# Patient Record
Sex: MALE | Race: WHITE | NOT HISPANIC OR LATINO | Employment: OTHER | URBAN - NONMETROPOLITAN AREA
[De-identification: names, ages, dates, MRNs, and addresses within clinical notes are randomized per-mention and may not be internally consistent; named-entity substitution may affect disease eponyms.]

---

## 2017-02-11 ENCOUNTER — HOSPITAL ENCOUNTER (EMERGENCY)
Facility: HOSPITAL | Age: 42
Discharge: HOME | End: 2017-02-11
Admitting: EMERGENCY MEDICINE
Payer: COMMERCIAL

## 2017-02-11 ENCOUNTER — APPOINTMENT (EMERGENCY)
Dept: RADIOLOGY | Facility: HOSPITAL | Age: 42
End: 2017-02-11
Payer: COMMERCIAL

## 2017-02-11 ENCOUNTER — APPOINTMENT (EMERGENCY)
Dept: CT IMAGING | Facility: HOSPITAL | Age: 42
End: 2017-02-11
Payer: COMMERCIAL

## 2017-02-11 VITALS
HEIGHT: 68 IN | RESPIRATION RATE: 20 BRPM | HEART RATE: 78 BPM | SYSTOLIC BLOOD PRESSURE: 136 MMHG | BODY MASS INDEX: 43.2 KG/M2 | OXYGEN SATURATION: 96 % | WEIGHT: 285.06 LBS | DIASTOLIC BLOOD PRESSURE: 74 MMHG | TEMPERATURE: 97.7 F

## 2017-02-11 DIAGNOSIS — J18.9 PNEUMONIA: Primary | ICD-10-CM

## 2017-02-11 LAB
ALBUMIN SERPL BCP-MCNC: 3.1 G/DL (ref 3.5–5)
ALP SERPL-CCNC: 47 U/L (ref 46–116)
ALT SERPL W P-5'-P-CCNC: 46 U/L (ref 12–78)
ANION GAP SERPL CALCULATED.3IONS-SCNC: 10 MMOL/L (ref 4–13)
APTT PPP: 33 SECONDS (ref 24–36)
AST SERPL W P-5'-P-CCNC: 53 U/L (ref 5–45)
BASOPHILS # BLD AUTO: 0.01 THOUSANDS/ΜL (ref 0–0.1)
BASOPHILS NFR BLD AUTO: 0 % (ref 0–1)
BILIRUB SERPL-MCNC: 0.6 MG/DL (ref 0.2–1)
BUN SERPL-MCNC: 13 MG/DL (ref 5–25)
CALCIUM SERPL-MCNC: 8.6 MG/DL (ref 8.3–10.1)
CHLORIDE SERPL-SCNC: 98 MMOL/L (ref 100–108)
CO2 SERPL-SCNC: 28 MMOL/L (ref 21–32)
CREAT SERPL-MCNC: 0.87 MG/DL (ref 0.6–1.3)
EOSINOPHIL # BLD AUTO: 0.1 THOUSAND/ΜL (ref 0–0.61)
EOSINOPHIL NFR BLD AUTO: 2 % (ref 0–6)
ERYTHROCYTE [DISTWIDTH] IN BLOOD BY AUTOMATED COUNT: 14 % (ref 11.6–15.1)
GFR SERPL CREATININE-BSD FRML MDRD: >60 ML/MIN/1.73SQ M
GLUCOSE SERPL-MCNC: 186 MG/DL (ref 65–140)
HCT VFR BLD AUTO: 42.6 % (ref 36.5–49.3)
HGB BLD-MCNC: 14.4 G/DL (ref 12–17)
INR PPP: 1.19 (ref 0.86–1.16)
LACTATE SERPL-SCNC: 1.4 MMOL/L (ref 0.5–2)
LIPASE SERPL-CCNC: 88 U/L (ref 73–393)
LYMPHOCYTES # BLD AUTO: 1.96 THOUSANDS/ΜL (ref 0.6–4.47)
LYMPHOCYTES NFR BLD AUTO: 34 % (ref 14–44)
MCH RBC QN AUTO: 28.6 PG (ref 26.8–34.3)
MCHC RBC AUTO-ENTMCNC: 33.8 G/DL (ref 31.4–37.4)
MCV RBC AUTO: 85 FL (ref 82–98)
MONOCYTES # BLD AUTO: 0.66 THOUSAND/ΜL (ref 0.17–1.22)
MONOCYTES NFR BLD AUTO: 11 % (ref 4–12)
NEUTROPHILS # BLD AUTO: 3.11 THOUSANDS/ΜL (ref 1.85–7.62)
NEUTS SEG NFR BLD AUTO: 53 % (ref 43–75)
NT-PROBNP SERPL-MCNC: 21 PG/ML
PLATELET # BLD AUTO: 180 THOUSANDS/UL (ref 149–390)
PMV BLD AUTO: 9.3 FL (ref 8.9–12.7)
POTASSIUM SERPL-SCNC: 4.4 MMOL/L (ref 3.5–5.3)
PROT SERPL-MCNC: 7.6 G/DL (ref 6.4–8.2)
PROTHROMBIN TIME: 14.7 SECONDS (ref 12–14.3)
RBC # BLD AUTO: 5.04 MILLION/UL (ref 3.88–5.62)
SODIUM SERPL-SCNC: 136 MMOL/L (ref 136–145)
SPECIMEN SOURCE: NORMAL
TROPONIN I BLD-MCNC: 0 NG/ML (ref 0–0.08)
WBC # BLD AUTO: 5.84 THOUSAND/UL (ref 4.31–10.16)

## 2017-02-11 PROCEDURE — 84484 ASSAY OF TROPONIN QUANT: CPT

## 2017-02-11 PROCEDURE — 85610 PROTHROMBIN TIME: CPT | Performed by: PHYSICIAN ASSISTANT

## 2017-02-11 PROCEDURE — 87798 DETECT AGENT NOS DNA AMP: CPT | Performed by: PHYSICIAN ASSISTANT

## 2017-02-11 PROCEDURE — 83605 ASSAY OF LACTIC ACID: CPT | Performed by: PHYSICIAN ASSISTANT

## 2017-02-11 PROCEDURE — 36415 COLL VENOUS BLD VENIPUNCTURE: CPT | Performed by: PHYSICIAN ASSISTANT

## 2017-02-11 PROCEDURE — 96365 THER/PROPH/DIAG IV INF INIT: CPT

## 2017-02-11 PROCEDURE — 85025 COMPLETE CBC W/AUTO DIFF WBC: CPT | Performed by: PHYSICIAN ASSISTANT

## 2017-02-11 PROCEDURE — 83690 ASSAY OF LIPASE: CPT | Performed by: PHYSICIAN ASSISTANT

## 2017-02-11 PROCEDURE — 99284 EMERGENCY DEPT VISIT MOD MDM: CPT

## 2017-02-11 PROCEDURE — 93005 ELECTROCARDIOGRAM TRACING: CPT | Performed by: PHYSICIAN ASSISTANT

## 2017-02-11 PROCEDURE — 96361 HYDRATE IV INFUSION ADD-ON: CPT

## 2017-02-11 PROCEDURE — 71275 CT ANGIOGRAPHY CHEST: CPT

## 2017-02-11 PROCEDURE — 85730 THROMBOPLASTIN TIME PARTIAL: CPT | Performed by: PHYSICIAN ASSISTANT

## 2017-02-11 PROCEDURE — 80053 COMPREHEN METABOLIC PANEL: CPT | Performed by: PHYSICIAN ASSISTANT

## 2017-02-11 PROCEDURE — 83880 ASSAY OF NATRIURETIC PEPTIDE: CPT | Performed by: PHYSICIAN ASSISTANT

## 2017-02-11 PROCEDURE — 71020 HB CHEST X-RAY 2VW FRONTAL&LATL: CPT

## 2017-02-11 RX ORDER — AZITHROMYCIN 250 MG/1
250 TABLET, FILM COATED ORAL DAILY
Qty: 6 TABLET | Refills: 0 | Status: SHIPPED | OUTPATIENT
Start: 2017-02-11 | End: 2017-02-16

## 2017-02-11 RX ORDER — CEFTRIAXONE SODIUM 1 G/50ML
1000 INJECTION, SOLUTION INTRAVENOUS ONCE
Status: COMPLETED | OUTPATIENT
Start: 2017-02-11 | End: 2017-02-11

## 2017-02-11 RX ADMIN — SODIUM CHLORIDE 1000 ML: 0.9 INJECTION, SOLUTION INTRAVENOUS at 12:15

## 2017-02-11 RX ADMIN — IOHEXOL 85 ML: 350 INJECTION, SOLUTION INTRAVENOUS at 13:39

## 2017-02-11 RX ADMIN — CEFTRIAXONE 1000 MG: 1 INJECTION, SOLUTION INTRAVENOUS at 14:33

## 2017-02-12 LAB
FLUAV AG SPEC QL: NORMAL
FLUBV AG SPEC QL: NORMAL
RSV B RNA SPEC QL NAA+PROBE: NORMAL

## 2017-02-13 LAB
ATRIAL RATE: 78 BPM
P AXIS: 27 DEGREES
PR INTERVAL: 116 MS
QRS AXIS: 16 DEGREES
QRSD INTERVAL: 80 MS
QT INTERVAL: 350 MS
QTC INTERVAL: 399 MS
T WAVE AXIS: 21 DEGREES
VENTRICULAR RATE: 78 BPM

## 2017-03-30 ENCOUNTER — TRANSCRIBE ORDERS (OUTPATIENT)
Dept: ADMINISTRATIVE | Facility: HOSPITAL | Age: 42
End: 2017-03-30

## 2017-03-30 ENCOUNTER — APPOINTMENT (OUTPATIENT)
Dept: LAB | Facility: HOSPITAL | Age: 42
End: 2017-03-30
Payer: COMMERCIAL

## 2017-03-30 DIAGNOSIS — Z11.3 ENCOUNTER FOR SCREENING FOR INFECTIONS WITH PREDOMINANTLY SEXUAL MODE OF TRANSMISSION: ICD-10-CM

## 2017-03-30 PROCEDURE — 87389 HIV-1 AG W/HIV-1&-2 AB AG IA: CPT

## 2017-03-30 PROCEDURE — 36415 COLL VENOUS BLD VENIPUNCTURE: CPT

## 2017-03-30 PROCEDURE — 87491 CHLMYD TRACH DNA AMP PROBE: CPT

## 2017-03-30 PROCEDURE — 86592 SYPHILIS TEST NON-TREP QUAL: CPT

## 2017-03-30 PROCEDURE — 80074 ACUTE HEPATITIS PANEL: CPT

## 2017-03-30 PROCEDURE — 87591 N.GONORRHOEAE DNA AMP PROB: CPT

## 2017-03-31 ENCOUNTER — GENERIC CONVERSION - ENCOUNTER (OUTPATIENT)
Dept: OTHER | Facility: OTHER | Age: 42
End: 2017-03-31

## 2017-03-31 LAB
CHLAMYDIA DNA CVX QL NAA+PROBE: NORMAL
HAV IGM SER QL: NORMAL
HBV CORE IGM SER QL: NORMAL
HBV SURFACE AG SER QL: NORMAL
HCV AB SER QL: NORMAL
HIV 1+2 AB+HIV1 P24 AG SERPL QL IA: NORMAL
N GONORRHOEA DNA GENITAL QL NAA+PROBE: NORMAL
RPR SER QL: NORMAL

## 2017-04-01 ENCOUNTER — GENERIC CONVERSION - ENCOUNTER (OUTPATIENT)
Dept: OTHER | Facility: OTHER | Age: 42
End: 2017-04-01

## 2017-04-20 ENCOUNTER — APPOINTMENT (EMERGENCY)
Dept: RADIOLOGY | Facility: HOSPITAL | Age: 42
End: 2017-04-20
Payer: COMMERCIAL

## 2017-04-20 ENCOUNTER — HOSPITAL ENCOUNTER (EMERGENCY)
Facility: HOSPITAL | Age: 42
Discharge: HOME | End: 2017-04-20
Attending: EMERGENCY MEDICINE
Payer: COMMERCIAL

## 2017-04-20 VITALS
DIASTOLIC BLOOD PRESSURE: 72 MMHG | BODY MASS INDEX: 41.68 KG/M2 | HEIGHT: 68 IN | HEART RATE: 88 BPM | OXYGEN SATURATION: 97 % | TEMPERATURE: 97.8 F | SYSTOLIC BLOOD PRESSURE: 161 MMHG | WEIGHT: 275 LBS | RESPIRATION RATE: 18 BRPM

## 2017-04-20 DIAGNOSIS — L03.115 CELLULITIS OF RIGHT FOOT: Primary | ICD-10-CM

## 2017-04-20 PROCEDURE — 90715 TDAP VACCINE 7 YRS/> IM: CPT | Performed by: EMERGENCY MEDICINE

## 2017-04-20 PROCEDURE — 73630 X-RAY EXAM OF FOOT: CPT

## 2017-04-20 PROCEDURE — 90471 IMMUNIZATION ADMIN: CPT

## 2017-04-20 PROCEDURE — 87205 SMEAR GRAM STAIN: CPT | Performed by: EMERGENCY MEDICINE

## 2017-04-20 PROCEDURE — 99283 EMERGENCY DEPT VISIT LOW MDM: CPT

## 2017-04-20 PROCEDURE — 87070 CULTURE OTHR SPECIMN AEROBIC: CPT | Performed by: EMERGENCY MEDICINE

## 2017-04-20 RX ORDER — NAPROXEN 500 MG/1
500 TABLET ORAL 2 TIMES DAILY WITH MEALS
Qty: 14 TABLET | Refills: 0 | Status: SHIPPED | OUTPATIENT
Start: 2017-04-20 | End: 2017-10-12 | Stop reason: ALTCHOICE

## 2017-04-20 RX ORDER — LEVOFLOXACIN 750 MG/1
750 TABLET ORAL DAILY
Qty: 7 TABLET | Refills: 0 | Status: SHIPPED | OUTPATIENT
Start: 2017-04-20 | End: 2017-04-27

## 2017-04-20 RX ORDER — CEPHALEXIN 250 MG/1
500 CAPSULE ORAL ONCE
Status: COMPLETED | OUTPATIENT
Start: 2017-04-20 | End: 2017-04-20

## 2017-04-20 RX ORDER — CEPHALEXIN 500 MG/1
500 CAPSULE ORAL 2 TIMES DAILY
Qty: 14 CAPSULE | Refills: 0 | Status: SHIPPED | OUTPATIENT
Start: 2017-04-20 | End: 2017-04-27

## 2017-04-20 RX ORDER — NAPROXEN 250 MG/1
500 TABLET ORAL ONCE
Status: COMPLETED | OUTPATIENT
Start: 2017-04-20 | End: 2017-04-20

## 2017-04-20 RX ADMIN — NAPROXEN 500 MG: 250 TABLET ORAL at 17:30

## 2017-04-20 RX ADMIN — CEPHALEXIN 500 MG: 250 CAPSULE ORAL at 17:30

## 2017-04-20 RX ADMIN — TETANUS TOXOID, REDUCED DIPHTHERIA TOXOID AND ACELLULAR PERTUSSIS VACCINE, ADSORBED 0.5 ML: 5; 2.5; 8; 8; 2.5 SUSPENSION INTRAMUSCULAR at 17:30

## 2017-04-20 RX ADMIN — LEVOFLOXACIN 750 MG: 500 TABLET, FILM COATED ORAL at 17:30

## 2017-04-22 LAB
BACTERIA WND AEROBE CULT: NORMAL
GRAM STN SPEC: NORMAL
GRAM STN SPEC: NORMAL

## 2017-05-21 ENCOUNTER — APPOINTMENT (EMERGENCY)
Dept: RADIOLOGY | Facility: HOSPITAL | Age: 42
End: 2017-05-21
Payer: COMMERCIAL

## 2017-05-21 ENCOUNTER — HOSPITAL ENCOUNTER (EMERGENCY)
Facility: HOSPITAL | Age: 42
Discharge: HOME | End: 2017-05-21
Payer: COMMERCIAL

## 2017-05-21 VITALS
DIASTOLIC BLOOD PRESSURE: 66 MMHG | HEIGHT: 68 IN | HEART RATE: 91 BPM | BODY MASS INDEX: 44.89 KG/M2 | RESPIRATION RATE: 18 BRPM | WEIGHT: 296.19 LBS | SYSTOLIC BLOOD PRESSURE: 115 MMHG | TEMPERATURE: 98.6 F | OXYGEN SATURATION: 95 %

## 2017-05-21 DIAGNOSIS — S50.00XA: Primary | ICD-10-CM

## 2017-05-21 PROCEDURE — 73080 X-RAY EXAM OF ELBOW: CPT

## 2017-05-21 PROCEDURE — 99283 EMERGENCY DEPT VISIT LOW MDM: CPT

## 2017-05-21 PROCEDURE — 73110 X-RAY EXAM OF WRIST: CPT

## 2017-10-12 ENCOUNTER — APPOINTMENT (EMERGENCY)
Dept: CT IMAGING | Facility: HOSPITAL | Age: 42
End: 2017-10-12
Payer: COMMERCIAL

## 2017-10-12 ENCOUNTER — HOSPITAL ENCOUNTER (EMERGENCY)
Facility: HOSPITAL | Age: 42
Discharge: HOME | End: 2017-10-13
Attending: EMERGENCY MEDICINE | Admitting: EMERGENCY MEDICINE
Payer: COMMERCIAL

## 2017-10-12 DIAGNOSIS — R73.9 HYPERGLYCEMIA: ICD-10-CM

## 2017-10-12 DIAGNOSIS — R42 DIZZINESS: Primary | ICD-10-CM

## 2017-10-12 LAB
ALBUMIN SERPL BCP-MCNC: 3.6 G/DL (ref 3.5–5)
ALP SERPL-CCNC: 57 U/L (ref 46–116)
ALT SERPL W P-5'-P-CCNC: 64 U/L (ref 12–78)
ANION GAP SERPL CALCULATED.3IONS-SCNC: 9 MMOL/L (ref 4–13)
APTT PPP: 35 SECONDS (ref 23–35)
AST SERPL W P-5'-P-CCNC: 50 U/L (ref 5–45)
BACTERIA UR QL AUTO: NORMAL /HPF
BASOPHILS # BLD AUTO: 0.03 THOUSANDS/ΜL (ref 0–0.1)
BASOPHILS NFR BLD AUTO: 0 % (ref 0–1)
BILIRUB SERPL-MCNC: 0.4 MG/DL (ref 0.2–1)
BILIRUB UR QL STRIP: NEGATIVE
BUN SERPL-MCNC: 17 MG/DL (ref 5–25)
CALCIUM SERPL-MCNC: 10 MG/DL (ref 8.3–10.1)
CHLORIDE SERPL-SCNC: 95 MMOL/L (ref 100–108)
CLARITY UR: CLEAR
CO2 SERPL-SCNC: 29 MMOL/L (ref 21–32)
COLOR UR: YELLOW
CREAT SERPL-MCNC: 0.96 MG/DL (ref 0.6–1.3)
EOSINOPHIL # BLD AUTO: 0.23 THOUSAND/ΜL (ref 0–0.61)
EOSINOPHIL NFR BLD AUTO: 2 % (ref 0–6)
ERYTHROCYTE [DISTWIDTH] IN BLOOD BY AUTOMATED COUNT: 13.8 % (ref 11.6–15.1)
GFR SERPL CREATININE-BSD FRML MDRD: 97 ML/MIN/1.73SQ M
GLUCOSE SERPL-MCNC: 337 MG/DL (ref 65–140)
GLUCOSE UR STRIP-MCNC: ABNORMAL MG/DL
HCT VFR BLD AUTO: 43.2 % (ref 36.5–49.3)
HGB BLD-MCNC: 14.9 G/DL (ref 12–17)
HGB UR QL STRIP.AUTO: NEGATIVE
INR PPP: 1.26 (ref 0.86–1.16)
KETONES UR STRIP-MCNC: NEGATIVE MG/DL
LEUKOCYTE ESTERASE UR QL STRIP: ABNORMAL
LYMPHOCYTES # BLD AUTO: 2.73 THOUSANDS/ΜL (ref 0.6–4.47)
LYMPHOCYTES NFR BLD AUTO: 25 % (ref 14–44)
MCH RBC QN AUTO: 28.9 PG (ref 26.8–34.3)
MCHC RBC AUTO-ENTMCNC: 34.5 G/DL (ref 31.4–37.4)
MCV RBC AUTO: 84 FL (ref 82–98)
MONOCYTES # BLD AUTO: 0.43 THOUSAND/ΜL (ref 0.17–1.22)
MONOCYTES NFR BLD AUTO: 4 % (ref 4–12)
NEUTROPHILS # BLD AUTO: 7.52 THOUSANDS/ΜL (ref 1.85–7.62)
NEUTS SEG NFR BLD AUTO: 69 % (ref 43–75)
NITRITE UR QL STRIP: NEGATIVE
NON-SQ EPI CELLS URNS QL MICRO: NORMAL /HPF
PH UR STRIP.AUTO: 5.5 [PH] (ref 4.5–8)
PLATELET # BLD AUTO: 266 THOUSANDS/UL (ref 149–390)
PMV BLD AUTO: 9.5 FL (ref 8.9–12.7)
POTASSIUM SERPL-SCNC: 4 MMOL/L (ref 3.5–5.3)
PROT SERPL-MCNC: 8.2 G/DL (ref 6.4–8.2)
PROT UR STRIP-MCNC: NEGATIVE MG/DL
PROTHROMBIN TIME: 15.7 SECONDS (ref 12.1–14.4)
RBC # BLD AUTO: 5.15 MILLION/UL (ref 3.88–5.62)
RBC #/AREA URNS AUTO: NORMAL /HPF
SODIUM SERPL-SCNC: 133 MMOL/L (ref 136–145)
SP GR UR STRIP.AUTO: 1.02 (ref 1–1.03)
TROPONIN I SERPL-MCNC: <0.02 NG/ML
UROBILINOGEN UR QL STRIP.AUTO: 0.2 E.U./DL
WBC # BLD AUTO: 10.94 THOUSAND/UL (ref 4.31–10.16)
WBC #/AREA URNS AUTO: NORMAL /HPF

## 2017-10-12 PROCEDURE — 84484 ASSAY OF TROPONIN QUANT: CPT | Performed by: EMERGENCY MEDICINE

## 2017-10-12 PROCEDURE — 81001 URINALYSIS AUTO W/SCOPE: CPT | Performed by: EMERGENCY MEDICINE

## 2017-10-12 PROCEDURE — 93005 ELECTROCARDIOGRAM TRACING: CPT | Performed by: EMERGENCY MEDICINE

## 2017-10-12 PROCEDURE — 85730 THROMBOPLASTIN TIME PARTIAL: CPT | Performed by: EMERGENCY MEDICINE

## 2017-10-12 PROCEDURE — 74177 CT ABD & PELVIS W/CONTRAST: CPT

## 2017-10-12 PROCEDURE — 85025 COMPLETE CBC W/AUTO DIFF WBC: CPT | Performed by: EMERGENCY MEDICINE

## 2017-10-12 PROCEDURE — 71275 CT ANGIOGRAPHY CHEST: CPT

## 2017-10-12 PROCEDURE — 70450 CT HEAD/BRAIN W/O DYE: CPT

## 2017-10-12 PROCEDURE — 85610 PROTHROMBIN TIME: CPT | Performed by: EMERGENCY MEDICINE

## 2017-10-12 PROCEDURE — 80053 COMPREHEN METABOLIC PANEL: CPT | Performed by: EMERGENCY MEDICINE

## 2017-10-12 PROCEDURE — 96361 HYDRATE IV INFUSION ADD-ON: CPT

## 2017-10-12 PROCEDURE — 96374 THER/PROPH/DIAG INJ IV PUSH: CPT

## 2017-10-12 RX ORDER — ONDANSETRON 2 MG/ML
4 INJECTION INTRAMUSCULAR; INTRAVENOUS ONCE
Status: COMPLETED | OUTPATIENT
Start: 2017-10-12 | End: 2017-10-12

## 2017-10-12 RX ORDER — TRAZODONE HYDROCHLORIDE 50 MG/1
50 TABLET ORAL
COMMUNITY
End: 2018-08-13

## 2017-10-12 RX ORDER — MECLIZINE HYDROCHLORIDE 25 MG/1
25 TABLET ORAL 3 TIMES DAILY PRN
Qty: 30 TABLET | Refills: 0 | Status: SHIPPED | OUTPATIENT
Start: 2017-10-12 | End: 2017-11-18 | Stop reason: ALTCHOICE

## 2017-10-12 RX ORDER — MECLIZINE HCL 12.5 MG/1
25 TABLET ORAL ONCE
Status: COMPLETED | OUTPATIENT
Start: 2017-10-12 | End: 2017-10-12

## 2017-10-12 RX ORDER — ONDANSETRON 4 MG/1
4 TABLET, ORALLY DISINTEGRATING ORAL EVERY 6 HOURS PRN
Qty: 20 TABLET | Refills: 0 | Status: SHIPPED | OUTPATIENT
Start: 2017-10-12 | End: 2017-11-18 | Stop reason: ALTCHOICE

## 2017-10-12 RX ADMIN — ONDANSETRON 4 MG: 2 INJECTION INTRAMUSCULAR; INTRAVENOUS at 20:56

## 2017-10-12 RX ADMIN — SODIUM CHLORIDE 1000 ML: 0.9 INJECTION, SOLUTION INTRAVENOUS at 20:53

## 2017-10-12 RX ADMIN — MECLIZINE 25 MG: 12.5 TABLET ORAL at 23:38

## 2017-10-12 RX ADMIN — IOHEXOL 100 ML: 350 INJECTION, SOLUTION INTRAVENOUS at 21:56

## 2017-10-13 VITALS
RESPIRATION RATE: 18 BRPM | BODY MASS INDEX: 44.26 KG/M2 | TEMPERATURE: 98.1 F | SYSTOLIC BLOOD PRESSURE: 110 MMHG | DIASTOLIC BLOOD PRESSURE: 64 MMHG | HEART RATE: 71 BPM | WEIGHT: 291.1 LBS | OXYGEN SATURATION: 99 %

## 2017-10-13 PROCEDURE — 99284 EMERGENCY DEPT VISIT MOD MDM: CPT

## 2017-10-13 NOTE — ED PROVIDER NOTES
Pt Name: Thania Pickard  MRN: [id number]  [place] 1975  Age/Sex: 43 y o  male  Date of evaluation: 10/12/2017  PCP: James Hidalgo, [address] [place] [address]    Chief Complaint   Patient presents with    Dizziness     Patient stated he started to not feel good yesterday  He has  nausea, vomiting, dizziness and blurred vision  Patient also stated about an hour prior to feeling this way he burned his right arm on the oven door  Patient stated some sob, pt has a hx pf PEs in left lung and is takign Xarelto  HPI    Emily Jackman presents to the Emergency Department complaining of dizziness  He is concerned because he feels similar to when he had his previous PE and was diagnosed with a clotting disorder  He is on anticoagulation and has been taking it regularly  HPI      Past Medical and Surgical History    Past Medical History:   Diagnosis Date    B[facility] ([facility] )     Chronic kidney disease     [facility]     Hyperlipidemia     Hypertension     Diana-Hussein tear     Pulmonary emboli (HCC)     Sleep apnea        Past Surgical History:   Procedure Laterality Date    NASAL SEPTUM SURGERY         History reviewed  No pertinent family history  Social History   Substance Use Topics    Smoking status: Never Smoker    Smokeless tobacco: Current User     Types: Chew    Alcohol use No              Allergies    Allergies   Allergen Reactions    Epinephrine Other (See Comments)     "pass out"    Other Other (See Comments)     NAUSEA VOMITING TO SHELLFISH    Shellfish-Derived Products GI Intolerance       Home Medications    Prior to Admission medications    Medication Sig Start Date End Date Taking? Authorizing Provider   atorvastatin (LIPITOR) 10 mg tablet Take 10 mg by mouth daily  Yes Historical Provider, MD   divalproex sodium (DEPAKOTE) 500 mg EC tablet Take 500 mg by mouth 2 (two) times a day     Yes Historical Provider, MD   lisinopril (ZESTRIL) 10 mg tablet Take 10 mg by mouth daily  Yes Historical Provider, MD   metFORMIN (GLUCOPHAGE) 1000 MG tablet Take 1,000 mg by mouth daily with breakfast   Yes Historical Provider, MD   rivaroxaban (XARELTO) tablet Take 20 mg by mouth daily  Yes Historical Provider, MD   sitaGLIPtin (JANUVIA) 100 mg tablet Take 100 mg by mouth daily   Yes Historical Provider, MD   traZODone (DESYREL) 50 mg tablet Take 50 mg by mouth daily at bedtime   Yes Historical Provider, MD   naproxen (NAPROSYN) 500 mg tablet Take 1 tablet by mouth 2 (two) times a day with meals for 7 days 4/20/17 10/12/17  Case RAAD Carl DO   ondansetron (ZOFRAN) 4 mg tablet Take 1 tablet (4 mg total) by mouth every 6 (six) hours  7/28/16 10/12/17  Case RAAD Carl DO           Review of Systems    Review of Systems   Constitutional: Negative for activity change, appetite change, chills, fatigue and fever  HENT: Negative for congestion, rhinorrhea, sinus pressure, sneezing, sore throat and trouble swallowing  Eyes: Negative for photophobia and visual disturbance  Respiratory: Negative for chest tightness, shortness of breath and wheezing  Cardiovascular: Negative for chest pain and leg swelling  Gastrointestinal: Negative for abdominal distention, abdominal pain, constipation, diarrhea, nausea and vomiting  Endocrine: Negative for polydipsia, polyphagia and polyuria  Genitourinary: Negative for decreased urine volume, difficulty urinating, dysuria, flank pain, frequency and urgency  Musculoskeletal: Negative for back pain, gait problem, joint swelling and neck pain  Skin: Negative for color change, pallor and rash  Allergic/Immunologic: Negative for immunocompromised state  Neurological: Negative for seizures, syncope, speech difficulty, weakness, light-headedness and headaches  Psychiatric/Behavioral: Negative for confusion  All other systems reviewed and are negative        Physical Exam      ED Triage Vitals [10/12/17 2003]   Temperature Pulse Respirations Blood Pressure SpO2   97 8 °F (36 6 °C) 89 20 107/62 97 %      Temp Source Heart Rate Source Patient Position - Orthostatic VS BP Location FiO2 (%)   Temporal Monitor Sitting Right arm --      Pain Score       8               Physical Exam   Constitutional: He is oriented to person, place, and time  He appears well-developed and well-nourished  No distress  HENT:   Head: Normocephalic and atraumatic  Nose: Nose normal    Mouth/Throat: Oropharynx is clear and moist    Eyes: Conjunctivae and EOM are normal  Pupils are equal, round, and reactive to light  Neck: Normal range of motion  Neck supple  Cardiovascular: Normal rate, regular rhythm and normal heart sounds  Exam reveals no gallop and no friction rub  No murmur heard  Pulmonary/Chest: Effort normal and breath sounds normal  No respiratory distress  He has no wheezes  He has no rales  Abdominal: Soft  Bowel sounds are normal  There is no tenderness  There is no rebound and no guarding  Musculoskeletal: Normal range of motion  Neurological: He is alert and oriented to person, place, and time  Skin: Skin is warm and dry  He is not diaphoretic  Psychiatric: He has a normal mood and affect  His behavior is normal    Nursing note and vitals reviewed  Assessment and Plan    Wade Brown is a 43 y o  male who presents with dizziness  Physical examination unremarkable  Plan will be to perform diagnostic testing and treat symptomatically  MDM    Diagnostic Results        EKG INTERPRETATION  RHYTHM:NSR @ 80  AXIS: normal  INTERVALS: normal  QRS COMPLEX: normal  ST SEGMENT: normal  QT INTERVAL: normal  COMPARED WITH PRIOR none available  Interpretation by Iftikhar Gan DO  EKG reviewed and interpreted independently      Labs:    Results for orders placed or performed during the hospital encounter of 10/12/17   CBC and differential   Result Value Ref Range    WBC 10 94 (H) 4 31 - 10 16 Thousand/uL    RBC 5 15 3 88 - 5 62 Million/uL    Hemoglobin 14 9 12 0 - 17 0 g/dL    Hematocrit 43 2 36 5 - 49 3 %    MCV 84 82 - 98 fL    MCH 28 9 26 8 - 34 3 pg    MCHC 34 5 31 4 - 37 4 g/dL    RDW 13 8 11 6 - 15 1 %    MPV 9 5 8 9 - 12 7 fL    Platelets 945 903 - 776 Thousands/uL    Neutrophils Relative 69 43 - 75 %    Lymphocytes Relative 25 14 - 44 %    Monocytes Relative 4 4 - 12 %    Eosinophils Relative 2 0 - 6 %    Basophils Relative 0 0 - 1 %    Neutrophils Absolute 7 52 1 85 - 7 62 Thousands/µL    Lymphocytes Absolute 2 73 0 60 - 4 47 Thousands/µL    Monocytes Absolute 0 43 0 17 - 1 22 Thousand/µL    Eosinophils Absolute 0 23 0 00 - 0 61 Thousand/µL    Basophils Absolute 0 03 0 00 - 0 10 Thousands/µL   Comprehensive metabolic panel   Result Value Ref Range    Sodium 133 (L) 136 - 145 mmol/L    Potassium 4 0 3 5 - 5 3 mmol/L    Chloride 95 (L) 100 - 108 mmol/L    CO2 29 21 - 32 mmol/L    Anion Gap 9 4 - 13 mmol/L    BUN 17 5 - 25 mg/dL    Creatinine 0 96 0 60 - 1 30 mg/dL    Glucose 337 (H) 65 - 140 mg/dL    Calcium 10 0 8 3 - 10 1 mg/dL    AST 50 (H) 5 - 45 U/L    ALT 64 12 - 78 U/L    Alkaline Phosphatase 57 46 - 116 U/L    Total Protein 8 2 6 4 - 8 2 g/dL    Albumin 3 6 3 5 - 5 0 g/dL    Total Bilirubin 0 40 0 20 - 1 00 mg/dL    eGFR 97 ml/min/1 73sq m   Protime-INR   Result Value Ref Range    Protime 15 7 (H) 12 1 - 14 4 seconds    INR 1 26 (H) 0 86 - 1 16   APTT   Result Value Ref Range    PTT 35 23 - 35 seconds   Troponin I   Result Value Ref Range    Troponin I <0 02 <=0 04 ng/mL   UA w Reflex to Microscopic w Reflex to Culture   Result Value Ref Range    Color, UA Yellow     Clarity, UA Clear     Specific Gravity, UA 1 020 1 003 - 1 030    pH, UA 5 5 4 5 - 8 0    Leukocytes, UA (A) Negative     Elevated glucose may cause decreased leukocyte values   See urine microscopic for [facility] result/    Nitrite, UA Negative Negative    Protein, UA Negative Negative mg/dl    Glucose, UA >=1000 (1%) (A) Negative mg/dl    Ketones, UA Negative Negative mg/dl    Urobilinogen, UA 0 2 0 2, 1 0 E U /dl E U /dl    Bilirubin, UA Negative Negative    Blood, UA Negative Negative, Trace-Intact   Urine Microscopic   Result Value Ref Range    RBC, UA None Seen None Seen, 0-5 /hpf    WBC, UA None Seen None Seen, 0-5, 5-55, 5-65 /hpf    Epithelial Cells Occasional None Seen, Occasional /hpf    Bacteria, UA Occasional None Seen, Occasional /hpf       All labs reviewed and utilized in the medical decision making process    Radiology:    PE Study with CT Abdomen and Pelvis with contrast   Final Result      No pulmonary embolism  No acute inflammatory process in the abdomen or pelvis  Hepatomegaly with steatosis  Splenomegaly  Workstation performed: [id number]         CT head without contrast   Final Result      No acute intracranial abnormality  Workstation performed: [id number]             All radiology studies independently viewed by me and interpreted by the radiologist     Procedure    Procedures    CritCare Time      ED Course of Care and Re-Assessments    Patient was feeling better after meds  He was able to ambulate without a problem  Medications   sodium chloride 0 9 % bolus 1,000 mL (0 mL Intravenous Stopped 10/12/17 2153)   ondansetron (ZOFRAN) injection 4 mg (4 mg Intravenous Given 10/12/17 2056)   iohexol (OMNIPAQUE) 350 MG/ML injection (SINGLE-DOSE) 100 mL (100 mL Intravenous Given 10/12/17 2156)   meclizine (ANTIVERT) tablet 25 mg (25 mg Oral Given 10/12/17 2338)           FINAL IMPRESSION    Final diagnoses:   Dizziness   Hyperglycemia         DISPOSITION/PLAN    ED Disposition     ED Disposition Condition Comment    Discharge  Shelbi Chacon discharge to home/self care      Condition at discharge: Good        Follow-up Information     Follow up With Specialties Details Why Contact Info Additional [address] [place],  Family Medicine Schedule an appointment as soon as possible for a visit  [address] [facility] [address]  [address]  [facility] Steve Sexton [address] [facility] Emergency Department Emergency Medicine Go to As needed, If symptoms worsen [facility] ED, [place] [address], [place], [place], [address]            PATIENT REFERRED TO:    Jessica Owens DO  [address]  [place] [address] [facility]    Schedule an appointment as soon as possible for a visit      [facility]  [place] [id number]  [phone]  Go to  As needed, If symptoms worsen      DISCHARGE MEDICATIONS:    Discharge Medication List as of 10/12/2017 11:59 PM      START taking these medications    Details   meclizine (ANTIVERT) 25 mg tablet Take 1 tablet by mouth 3 (three) times a day as needed for dizziness, Starting Thu 10/12/2017, Print      ondansetron (ZOFRAN-ODT) 4 mg disintegrating tablet Take 1 tablet by mouth every 6 (six) hours as needed for nausea or vomiting, Starting u 10/12/2017, Print         CONTINUE these medications which have NOT CHANGED    Details   atorvastatin (LIPITOR) 10 mg tablet Take 10 mg by mouth daily  , Until Discontinued, Historical Med      divalproex sodium (DEPAKOTE) 500 mg EC tablet Take 500 mg by mouth 2 (two) times a day , Until Discontinued, Historical Med      lisinopril (ZESTRIL) 10 mg tablet Take 10 mg by mouth daily  , Until Discontinued, Historical Med      metFORMIN (GLUCOPHAGE) 1000 MG tablet Take 1,000 mg by mouth daily with breakfast, Until Discontinued, Historical Med      rivaroxaban (XARELTO) tablet Take 20 mg by mouth daily  , Until Discontinued, Historical Med      sitaGLIPtin (JANUVIA) 100 mg tablet Take 100 mg by mouth daily, Until Discontinued, Historical Med      traZODone (DESYREL) 50 mg tablet Take 50 mg by mouth daily at bedtime, Historical Med             No discharge procedures on file           DO Tim Ceja DO Venkata  10/13/17 7067

## 2017-10-13 NOTE — DISCHARGE INSTRUCTIONS
Dizziness, Ambulatory Care   GENERAL INFORMATION:   Dizziness  is a term used to describe a feeling of being off balance or unsteady  Common causes of dizziness are an inner ear fluid imbalance or a lack of oxygen in your blood  Your dizziness may be acute (lasts 3 days or less) or chronic (lasts longer than 3 days)  You may have dizzy spells that last from seconds to a few hours  Common symptoms related to dizziness include the following:   · A feeling that your surroundings are moving even though you are standing still    · Ringing in your ears or hearing loss     · Feeling faint or lightheaded     · Weakness or unsteadiness     · Double vision or eye movements you cannot control    · Nausea or vomiting     · Confusion  Seek immediate care for the following symptoms:   · You have a headache that does not go away with medicine  · You have shaking chills and a fever  · You vomit over and over with no relief  · Your vomit or bowel movements are red or black  · You have pain in your chest, back, or abdomen  · You have numbness, especially in your face, arms, or legs  · You have trouble moving your arms or legs  Treatment for dizziness  depends on the cause of your dizziness  You may need medicines to decrease your dizziness or nausea  You may need to be admitted to the hospital for treatment  Manage your symptoms:  Get up slowly from sitting or lying down  Do not drive or operate machinery when you are dizzy  Follow up with your healthcare provider as directed:  Write down your questions so you remember to ask them during your visits  CARE AGREEMENT:   You have the right to help plan your care  Learn about your health condition and how it may be treated  Discuss treatment options with your caregivers to decide what care you want to receive  You always have the right to refuse treatment  The above information is an  only   It is not intended as medical advice for individual conditions or treatments  Talk to your doctor, nurse or pharmacist before following any medical regimen to see if it is safe and effective for you  © 2014 [address] is for End User's use only and may not be sold, redistributed or otherwise used for commercial purposes  All illustrations and images included in CareNotes® are the copyrighted property of A D A M , Inc  or Miguel Santo

## 2017-10-15 LAB
ATRIAL RATE: 80 BPM
P AXIS: 28 DEGREES
PR INTERVAL: 126 MS
QRS AXIS: 3 DEGREES
QRSD INTERVAL: 82 MS
QT INTERVAL: 370 MS
QTC INTERVAL: 426 MS
T WAVE AXIS: 20 DEGREES
VENTRICULAR RATE: 80 BPM

## 2017-11-18 ENCOUNTER — HOSPITAL ENCOUNTER (EMERGENCY)
Facility: HOSPITAL | Age: 42
Discharge: HOME | End: 2017-11-18
Attending: EMERGENCY MEDICINE | Admitting: EMERGENCY MEDICINE
Payer: COMMERCIAL

## 2017-11-18 VITALS
TEMPERATURE: 96.7 F | BODY MASS INDEX: 42.57 KG/M2 | SYSTOLIC BLOOD PRESSURE: 135 MMHG | HEART RATE: 72 BPM | HEIGHT: 68 IN | RESPIRATION RATE: 17 BRPM | DIASTOLIC BLOOD PRESSURE: 69 MMHG | WEIGHT: 280.87 LBS | OXYGEN SATURATION: 99 %

## 2017-11-18 DIAGNOSIS — L03.90 CELLULITIS: Primary | ICD-10-CM

## 2017-11-18 PROCEDURE — 99283 EMERGENCY DEPT VISIT LOW MDM: CPT

## 2017-11-18 RX ORDER — CEPHALEXIN 500 MG/1
500 CAPSULE ORAL EVERY 6 HOURS SCHEDULED
Qty: 40 CAPSULE | Refills: 0 | Status: SHIPPED | OUTPATIENT
Start: 2017-11-18 | End: 2017-11-28

## 2017-11-18 RX ORDER — CEPHALEXIN 250 MG/1
500 CAPSULE ORAL ONCE
Status: COMPLETED | OUTPATIENT
Start: 2017-11-18 | End: 2017-11-18

## 2017-11-18 RX ADMIN — CEPHALEXIN 500 MG: 250 CAPSULE ORAL at 05:49

## 2017-11-18 NOTE — ED PROVIDER NOTES
History  Chief Complaint   Patient presents with    Foot Injury     Patient stubbed his right great toe 3 times in the past 3 days  Patient is a diabetic and wanted it checked out  49-year-old male patient presents emergency department for evaluation of right great toe pain  The patient does have an ingrown toenail  The patient is supposed to follow up with Podiatry but never did  The patient has some mild surrounding cellulitis of the nail with purulent discharge  The patient was started on Keflex, will be sent for follow-up to Podiatry  Patient has no systemic signs of infection  There is no lymphangitis  There is no expanding cellulitis  The infection has been present for over a week currently  History provided by:  Patient   used: No    Toe Pain   Location:  Right great toe  Severity:  Mild  Onset quality:  Gradual  Timing:  Constant  Progression:  Worsening  Chronicity:  Recurrent  Associated symptoms: no chest pain, no congestion, no ear pain and no fever        Prior to Admission Medications   Prescriptions Last Dose Informant Patient Reported? Taking?   atorvastatin (LIPITOR) 10 mg tablet   Yes Yes   Sig: Take 10 mg by mouth daily  divalproex sodium (DEPAKOTE) 500 mg EC tablet   Yes Yes   Sig: Take 500 mg by mouth 2 (two) times a day  lisinopril (ZESTRIL) 10 mg tablet   Yes Yes   Sig: Take 10 mg by mouth daily  metFORMIN (GLUCOPHAGE) 1000 MG tablet   Yes Yes   Sig: Take 1,000 mg by mouth daily with breakfast   rivaroxaban (XARELTO) tablet   Yes Yes   Sig: Take 20 mg by mouth daily       sitaGLIPtin (JANUVIA) 100 mg tablet   Yes Yes   Sig: Take 100 mg by mouth daily   traZODone (DESYREL) 50 mg tablet   Yes Yes   Sig: Take 50 mg by mouth daily at bedtime      Facility-Administered Medications: None       Past Medical History:   Diagnosis Date    [facility]     Chronic kidney disease     [facility] mellitus ([facility] )     Hyperlipidemia     Hypertension  Diana-Hussein tear     Pulmonary emboli (HCC)     Sleep apnea        Past Surgical History:   Procedure Laterality Date    NASAL SEPTUM SURGERY         History reviewed  No pertinent family history  I have reviewed and agree with the history as documented  Social History   Substance Use Topics    Smoking status: Never Smoker    Smokeless tobacco: Current User     Types: Chew    Alcohol use No        Review of Systems   Constitutional: Negative for fever  HENT: Negative for congestion and ear pain  Cardiovascular: Negative for chest pain  All other systems reviewed and are negative  Physical Exam  ED Triage Vitals [11/18/17 0530]   Temperature Pulse Respirations Blood Pressure SpO2   (!) 96 7 °F (35 9 °C) 72 17 135/69 99 %      Temp Source Heart Rate Source Patient Position - Orthostatic VS BP Location FiO2 (%)   Temporal Monitor Lying Left arm --      Pain Score       7           Orthostatic Vital Signs  Vitals:    11/18/17 0530   BP: 135/69   Pulse: 72   Patient Position - Orthostatic VS: Lying       Physical Exam   Constitutional: He is oriented to person, place, and time  He appears well-developed and well-nourished  No distress  HENT:   Head: Normocephalic and atraumatic  Right Ear: External ear normal    Left Ear: External ear normal    Eyes: Conjunctivae and EOM are normal  Right eye exhibits no discharge  Left eye exhibits no discharge  No scleral icterus  Neck: Normal range of motion  Neck supple  No JVD present  No tracheal deviation present  No thyromegaly present  Cardiovascular: Normal rate and regular rhythm  Pulmonary/Chest: Effort normal and breath sounds normal  No stridor  No respiratory distress  He has no wheezes  He has no rales  Abdominal: Soft  Bowel sounds are normal  He exhibits no distension  There is no tenderness  Musculoskeletal: Normal range of motion  He exhibits no edema, tenderness or deformity     Neurological: He is alert and oriented to person, place, and time  No cranial nerve deficit  Coordination normal    Skin: Skin is warm and dry  He is not diaphoretic  Psychiatric: He has a normal mood and affect  His behavior is normal    Nursing note and vitals reviewed  ED Medications  Medications   cephalexin (KEFLEX) capsule 500 mg (not administered)       Diagnostic Studies  Results Reviewed     None                 No orders to display              Procedures  Procedures       Phone Contacts  ED Phone Contact    ED Course  ED Course                                MDM  Number of Diagnoses or Management Options  Cellulitis: new and requires workup     Amount and/or Complexity of Data Reviewed  Decide to obtain previous medical records or to obtain history from someone other than the patient: yes  Review and summarize past medical records: yes    Patient Progress  Patient progress: stable    CritCare Time    Disposition  Final diagnoses:   Cellulitis     Time reflects when diagnosis was documented in both MDM as applicable and the Disposition within this note     Time User Action Codes Description Comment    11/18/2017  5:42 AM Kae Pizano Add [L03 90] Cellulitis       ED Disposition     ED Disposition Condition Comment    Discharge  Patricia Farris discharge to home/self care  Condition at discharge: Stable        Follow-up Information     Follow up With Specialties Details Why R Ismael Pino 73, DPM Podiatry   [facility] [address]  [address]  [phone]          Patient's Medications   Discharge Prescriptions    CEPHALEXIN (KEFLEX) 500 MG CAPSULE    Take 1 capsule by mouth every 6 (six) hours for 10 days       Start Date: 11/18/2017End Date: 11/28/2017       Order Dose: 500 mg       Quantity: 40 capsule    Refills: 0     No discharge procedures on file      ED Provider  Electronically Signed by           Nkechi Ambrose DO  11/18/17 4066

## 2017-11-18 NOTE — DISCHARGE INSTRUCTIONS
Cellulitis, Ambulatory Care   GENERAL INFORMATION:   Cellulitis  is a skin infection caused by bacteria  Common symptoms include the following:   · Fever    · A red, warm, swollen area on your skin    · Pain when the area is touched    · Bumps or blisters (abscess) that may drain pus    · Bumpy, raised skin that feels like an orange peel  Seek immediate care for the following symptoms:   · An increase in pain, redness, warmth, and size    · Red streaks coming from the infected area    · A thin, gray-brown discharge coming from your infected skin area    · A crackling under your skin when you touch it    · Purple dots or bumps on your skin, or bleeding under your skin    · New swelling and pain in your legs    · Sudden trouble breathing or chest pain  Treatment for cellulitis  may include medicines to treat the bacterial infection or decrease pain  The infection may need to be cleaned out  Damaged, dead, or infected tissue may need to be cut away to help your wound heal   Manage your symptoms:   · Elevate your wound above the level of your heart  as often as you can  This will help decrease swelling and pain  Prop your wound on pillows or blankets to keep it elevated comfortably  · Clean your wound as directed  You may need to wash the wound with soap and water  Look for signs of infection  · Wear pressure stockings as directed  The stockings are tight and put pressure on your legs  This improves blood flow and decreases swelling  Prevent cellulitis:   · Wash your hands often  Use soap and water  Wash your hands after you use the bathroom, change a child's diapers, or sneeze  Wash your hands before you prepare or eat food  Use lotion to prevent dry, cracked skin  · Do not share personal items, such as towels, clothing, and razors  · Clean exercise equipment  with germ-killing  before and after you use it    Follow up with your healthcare provider as directed:  Write down your questions so you remember to ask them during your visits  CARE AGREEMENT:   You have the right to help plan your care  Learn about your health condition and how it may be treated  Discuss treatment options with your caregivers to decide what care you want to receive  You always have the right to refuse treatment  The above information is an  only  It is not intended as medical advice for individual conditions or treatments  Talk to your doctor, nurse or pharmacist before following any medical regimen to see if it is safe and effective for you  © 2014 [address] is for End User's use only and may not be sold, redistributed or otherwise used for commercial purposes  All illustrations and images included in CareNotes® are the copyrighted property of [employer]  or Miguel Santo

## 2017-11-30 ENCOUNTER — GENERIC CONVERSION - ENCOUNTER (OUTPATIENT)
Dept: OTHER | Facility: OTHER | Age: 42
End: 2017-11-30

## 2017-12-07 ENCOUNTER — APPOINTMENT (OUTPATIENT)
Dept: LAB | Facility: MEDICAL CENTER | Age: 42
End: 2017-12-07
Payer: COMMERCIAL

## 2017-12-07 ENCOUNTER — HOSPITAL ENCOUNTER (EMERGENCY)
Facility: HOSPITAL | Age: 42
Discharge: HOME | End: 2017-12-07
Attending: EMERGENCY MEDICINE | Admitting: EMERGENCY MEDICINE
Payer: COMMERCIAL

## 2017-12-07 ENCOUNTER — TRANSCRIBE ORDERS (OUTPATIENT)
Dept: LAB | Facility: MEDICAL CENTER | Age: 42
End: 2017-12-07

## 2017-12-07 ENCOUNTER — ALLSCRIPTS OFFICE VISIT (OUTPATIENT)
Dept: OTHER | Facility: OTHER | Age: 42
End: 2017-12-07

## 2017-12-07 ENCOUNTER — GENERIC CONVERSION - ENCOUNTER (OUTPATIENT)
Dept: OTHER | Facility: OTHER | Age: 42
End: 2017-12-07

## 2017-12-07 VITALS
TEMPERATURE: 97.3 F | HEART RATE: 92 BPM | RESPIRATION RATE: 18 BRPM | OXYGEN SATURATION: 98 % | WEIGHT: 284.3 LBS | DIASTOLIC BLOOD PRESSURE: 81 MMHG | BODY MASS INDEX: 43.23 KG/M2 | SYSTOLIC BLOOD PRESSURE: 149 MMHG

## 2017-12-07 DIAGNOSIS — E11.9 TYPE 2 DIABETES MELLITUS WITHOUT COMPLICATIONS (HCC): ICD-10-CM

## 2017-12-07 DIAGNOSIS — R06.2 WHEEZING: ICD-10-CM

## 2017-12-07 DIAGNOSIS — J02.9 PHARYNGITIS: Primary | ICD-10-CM

## 2017-12-07 LAB
ALBUMIN SERPL BCP-MCNC: 3.6 G/DL (ref 3.5–5)
ALP SERPL-CCNC: 52 U/L (ref 46–116)
ALT SERPL W P-5'-P-CCNC: 44 U/L (ref 12–78)
ANION GAP SERPL CALCULATED.3IONS-SCNC: 9 MMOL/L (ref 4–13)
AST SERPL W P-5'-P-CCNC: 29 U/L (ref 5–45)
BASOPHILS # BLD AUTO: 0.02 THOUSANDS/ΜL (ref 0–0.1)
BASOPHILS NFR BLD AUTO: 0 % (ref 0–1)
BILIRUB SERPL-MCNC: 0.56 MG/DL (ref 0.2–1)
BUN SERPL-MCNC: 16 MG/DL (ref 5–25)
CALCIUM SERPL-MCNC: 9.8 MG/DL (ref 8.3–10.1)
CHLORIDE SERPL-SCNC: 97 MMOL/L (ref 100–108)
CHOLEST SERPL-MCNC: 115 MG/DL (ref 50–200)
CO2 SERPL-SCNC: 27 MMOL/L (ref 21–32)
CREAT SERPL-MCNC: 0.81 MG/DL (ref 0.6–1.3)
CREAT UR-MCNC: 327 MG/DL
EOSINOPHIL # BLD AUTO: 0.46 THOUSAND/ΜL (ref 0–0.61)
EOSINOPHIL NFR BLD AUTO: 4 % (ref 0–6)
ERYTHROCYTE [DISTWIDTH] IN BLOOD BY AUTOMATED COUNT: 13.9 % (ref 11.6–15.1)
EST. AVERAGE GLUCOSE BLD GHB EST-MCNC: 169 MG/DL
GFR SERPL CREATININE-BSD FRML MDRD: 110 ML/MIN/1.73SQ M
GLUCOSE P FAST SERPL-MCNC: 155 MG/DL (ref 65–99)
HBA1C MFR BLD: 7.5 % (ref 4.2–6.3)
HCT VFR BLD AUTO: 43.6 % (ref 36.5–49.3)
HDLC SERPL-MCNC: 34 MG/DL (ref 40–60)
HGB BLD-MCNC: 14.8 G/DL (ref 12–17)
LDLC SERPL CALC-MCNC: 51 MG/DL (ref 0–100)
LYMPHOCYTES # BLD AUTO: 2.62 THOUSANDS/ΜL (ref 0.6–4.47)
LYMPHOCYTES NFR BLD AUTO: 23 % (ref 14–44)
MCH RBC QN AUTO: 29.3 PG (ref 26.8–34.3)
MCHC RBC AUTO-ENTMCNC: 33.9 G/DL (ref 31.4–37.4)
MCV RBC AUTO: 86 FL (ref 82–98)
MICROALBUMIN UR-MCNC: 96.8 MG/L (ref 0–20)
MICROALBUMIN/CREAT 24H UR: 30 MG/G CREATININE (ref 0–30)
MONOCYTES # BLD AUTO: 0.67 THOUSAND/ΜL (ref 0.17–1.22)
MONOCYTES NFR BLD AUTO: 6 % (ref 4–12)
NEUTROPHILS # BLD AUTO: 7.53 THOUSANDS/ΜL (ref 1.85–7.62)
NEUTS SEG NFR BLD AUTO: 67 % (ref 43–75)
NRBC BLD AUTO-RTO: 0 /100 WBCS
PLATELET # BLD AUTO: 253 THOUSANDS/UL (ref 149–390)
PMV BLD AUTO: 9.7 FL (ref 8.9–12.7)
POTASSIUM SERPL-SCNC: 4.1 MMOL/L (ref 3.5–5.3)
PROT SERPL-MCNC: 8.5 G/DL (ref 6.4–8.2)
RBC # BLD AUTO: 5.05 MILLION/UL (ref 3.88–5.62)
SODIUM SERPL-SCNC: 133 MMOL/L (ref 136–145)
TRIGL SERPL-MCNC: 151 MG/DL
TSH SERPL DL<=0.05 MIU/L-ACNC: 2.05 UIU/ML (ref 0.36–3.74)
WBC # BLD AUTO: 11.36 THOUSAND/UL (ref 4.31–10.16)

## 2017-12-07 PROCEDURE — 80061 LIPID PANEL: CPT

## 2017-12-07 PROCEDURE — 82570 ASSAY OF URINE CREATININE: CPT

## 2017-12-07 PROCEDURE — 84443 ASSAY THYROID STIM HORMONE: CPT

## 2017-12-07 PROCEDURE — 83036 HEMOGLOBIN GLYCOSYLATED A1C: CPT

## 2017-12-07 PROCEDURE — 82043 UR ALBUMIN QUANTITATIVE: CPT

## 2017-12-07 PROCEDURE — 85025 COMPLETE CBC W/AUTO DIFF WBC: CPT

## 2017-12-07 PROCEDURE — 36415 COLL VENOUS BLD VENIPUNCTURE: CPT

## 2017-12-07 PROCEDURE — 99282 EMERGENCY DEPT VISIT SF MDM: CPT

## 2017-12-07 PROCEDURE — 80053 COMPREHEN METABOLIC PANEL: CPT

## 2017-12-07 RX ORDER — ALBUTEROL SULFATE 90 UG/1
2 AEROSOL, METERED RESPIRATORY (INHALATION) ONCE
Status: COMPLETED | OUTPATIENT
Start: 2017-12-07 | End: 2017-12-07

## 2017-12-07 RX ORDER — AZITHROMYCIN 250 MG/1
500 TABLET, FILM COATED ORAL ONCE
Status: COMPLETED | OUTPATIENT
Start: 2017-12-07 | End: 2017-12-07

## 2017-12-07 RX ORDER — ALBUTEROL SULFATE 90 UG/1
2 AEROSOL, METERED RESPIRATORY (INHALATION) EVERY 4 HOURS PRN
Qty: 1 INHALER | Refills: 0 | Status: SHIPPED | OUTPATIENT
Start: 2017-12-07 | End: 2018-12-07

## 2017-12-07 RX ORDER — AZITHROMYCIN 250 MG/1
250 TABLET, FILM COATED ORAL DAILY
Qty: 4 TABLET | Refills: 0 | Status: SHIPPED | OUTPATIENT
Start: 2017-12-07 | End: 2017-12-11

## 2017-12-07 RX ADMIN — AZITHROMYCIN 500 MG: 250 TABLET, FILM COATED ORAL at 01:46

## 2017-12-07 RX ADMIN — ALBUTEROL SULFATE 2 PUFF: 90 AEROSOL, METERED RESPIRATORY (INHALATION) at 01:46

## 2017-12-07 NOTE — ED PROVIDER NOTES
History  Chief Complaint   Patient presents with    Sore Throat     STATED SYMPTOMS X 5 DAYS, SORE MPUTH EVER SINCE FLU SHOT     19-year-old male said one-week history of sore throat he states it started after obtaining his flu shot  He has been able to tolerate fluids he has had a fever on and off he is complaining of some mouth sores otherwise no history of rash  slight cough  some nasal congestion  No lightheadedness no nausea vomiting or diarrhea no abdominal pain he has chronic back pain a no chest pain no SOB has been wheezing uses MDI but gave it to his wife  no lower extremity edema blood sugars have been approximately 150s             Prior to Admission Medications   Prescriptions Last Dose Informant Patient Reported? Taking?   atorvastatin (LIPITOR) 10 mg tablet   Yes No   Sig: Take 10 mg by mouth daily  divalproex sodium (DEPAKOTE) 500 mg EC tablet   Yes No   Sig: Take 500 mg by mouth 2 (two) times a day  lisinopril (ZESTRIL) 10 mg tablet   Yes No   Sig: Take 10 mg by mouth daily  metFORMIN (GLUCOPHAGE) 1000 MG tablet   Yes No   Sig: Take 1,000 mg by mouth daily with breakfast   rivaroxaban (XARELTO) tablet   Yes No   Sig: Take 20 mg by mouth daily  sitaGLIPtin (JANUVIA) 100 mg tablet   Yes No   Sig: Take 100 mg by mouth daily   traZODone (DESYREL) 50 mg tablet   Yes No   Sig: Take 50 mg by mouth daily at bedtime      Facility-Administered Medications: None       Past Medical History:   Diagnosis Date    [facility] ([facility] )     Chronic kidney disease     Diabetes mellitus ([facility] )     Hyperlipidemia     Hypertension     Diana-Hussein tear     Pulmonary emboli (HCC)     Sleep apnea        Past Surgical History:   Procedure Laterality Date    NASAL SEPTUM SURGERY         History reviewed  No pertinent family history  I have reviewed and agree with the history as documented      Social History   Substance Use Topics    Smoking status: Never Smoker    Smokeless tobacco: Current User     Types: Nathalie Brittle Alcohol use No        Review of Systems   Constitutional: Positive for fever  Negative for activity change, appetite change, chills and diaphoresis  HENT: Positive for congestion, mouth sores and sore throat  Negative for ear pain, rhinorrhea, sinus pain, trouble swallowing and voice change  Eyes: Negative for discharge  Respiratory: Positive for cough and wheezing  Negative for chest tightness and shortness of breath  Cardiovascular: Negative for chest pain  Gastrointestinal: Negative for abdominal pain, diarrhea, nausea and vomiting  Genitourinary: Negative for difficulty urinating  Musculoskeletal: Negative for back pain  Skin: Negative for rash  Neurological: Negative for dizziness, syncope, weakness, light-headedness and headaches  Psychiatric/Behavioral: Negative for confusion  All other systems reviewed and are negative  Physical Exam  ED Triage Vitals [12/07/17 0142]   Temperature Pulse Respirations Blood Pressure SpO2   (!) 97 3 °F (36 3 °C) 92 18 149/81 98 %      Temp Source Heart Rate Source Patient Position - Orthostatic VS BP Location FiO2 (%)   Temporal Monitor Sitting Left arm --      Pain Score       --           Orthostatic Vital Signs  Vitals:    12/07/17 0142   BP: 149/81   Pulse: 92   Patient Position - Orthostatic VS: Sitting       Physical Exam   Constitutional: He is oriented to person, place, and time  He appears well-developed and well-nourished  No distress  Tolerating sips of water   HENT:   Head: Normocephalic and atraumatic  Right Ear: External ear normal    Left Ear: External ear normal    Nose: Nose normal    Mouth/Throat: No oropharyngeal exudate  TMS pale bilaterally; uvula midline tonsils 1+erythema to tonsillar pillars bilaterally; small 1mm exudates to oral mucosa just lateral to commissures bilaterally no ulcers  Eyes: Conjunctivae and EOM are normal  Pupils are equal, round, and reactive to light   Right eye exhibits no discharge  Left eye exhibits no discharge  Neck: Normal range of motion  Neck supple  Cardiovascular: Normal rate, regular rhythm and normal heart sounds  Pulmonary/Chest: Effort normal  No respiratory distress  He has wheezes (scant)  He has no rales  Abdominal: Soft  Bowel sounds are normal  He exhibits no distension  There is no tenderness  There is no rebound and no guarding  No CVA tenderness   Musculoskeletal: Normal range of motion  He exhibits no edema, tenderness or deformity  Lymphadenopathy:     He has no cervical adenopathy  Neurological: He is alert and oriented to person, place, and time  He displays normal reflexes  No cranial nerve deficit or sensory deficit  He exhibits normal muscle tone  Coordination normal    Gait steady   Skin: Skin is warm and dry  Capillary refill takes less than 2 seconds  He is not diaphoretic  Psychiatric: He has a normal mood and affect  Vitals reviewed  ED Medications  Medications   albuterol (PROVENTIL HFA,VENTOLIN HFA) inhaler 2 puff (2 puffs Inhalation Given 12/7/17 0146)   azithromycin (ZITHROMAX) tablet 500 mg (500 mg Oral Given 12/7/17 0146)       Diagnostic Studies  Results Reviewed     None                 No orders to display              Procedures  Procedures       Phone Contacts  ED Phone Contact    ED Course  ED Course as of Dec 07 1128   Thu Dec 07, 2017   0152 Recheck after MDI wheezing resolves will defer steriods  Recommended non-alcoholic mouth was for mouth sores   Has f/u with PMD later today recommend he keep the appt                                MDM  CritCare Time    Disposition  Final diagnoses:   Pharyngitis   Wheezing     Time reflects when diagnosis was documented in both MDM as applicable and the Disposition within this note     Time User Action Codes Description Comment    12/7/2017  1:53 AM Alycia Palmer Add [J02 9] Pharyngitis     12/7/2017  1:53 AM Alycia Palmer Add [R06 2] Wheezing       ED Disposition ED Disposition Condition Comment    Discharge  Danny Reyes discharge to home/self care  Condition at discharge: Good        Follow-up Information     Follow up With Specialties Details Why [address] Krupa Calero, DO Family Medicine  keep appointment later today [facility]  [address] [address]  [facility] [address]          Discharge Medication List as of 12/7/2017  1:54 AM      START taking these medications    Details   albuterol (PROVENTIL HFA,VENTOLIN HFA) 90 mcg/act inhaler Inhale 2 puffs every 4 (four) hours as needed for wheezing, Starting Thu 12/7/2017, Until Fri 12/7/2018, Print      azithromycin (ZITHROMAX Z-CYNTHIA) 250 mg tablet Take 1 tablet by mouth daily for 4 days, Starting Thu 12/7/2017, Until Mon 12/11/2017, Print         CONTINUE these medications which have NOT CHANGED    Details   atorvastatin (LIPITOR) 10 mg tablet Take 10 mg by mouth daily  , Until Discontinued, Historical Med      divalproex sodium (DEPAKOTE) 500 mg EC tablet Take 500 mg by mouth 2 (two) times a day , Until Discontinued, Historical Med      lisinopril (ZESTRIL) 10 mg tablet Take 10 mg by mouth daily  , Until Discontinued, Historical Med      metFORMIN (GLUCOPHAGE) 1000 MG tablet Take 1,000 mg by mouth daily with breakfast, Until Discontinued, Historical Med      rivaroxaban (XARELTO) tablet Take 20 mg by mouth daily  , Until Discontinued, Historical Med      sitaGLIPtin (JANUVIA) 100 mg tablet Take 100 mg by mouth daily, Until Discontinued, Historical Med      traZODone (DESYREL) 50 mg tablet Take 50 mg by mouth daily at bedtime, Historical Med           No discharge procedures on file      ED Provider  Electronically Signed by           Martin Hutton MD  12/07/17 9032

## 2017-12-07 NOTE — DISCHARGE INSTRUCTIONS
Pharyngitis   WHAT YOU NEED TO KNOW:   Pharyngitis, or sore throat, is inflammation of the tissues and structures in your pharynx (throat)  Pharyngitis is most often caused by bacteria  It may also be caused by a cold or flu virus  Other causes include smoking, allergies, or acid reflux  DISCHARGE INSTRUCTIONS:   Call 911 for any of the following:   · You have trouble breathing or swallowing because your throat is swollen or sore  Return to the emergency department if:   · You are drooling because it hurts too much to swallow  · Your fever is higher than 102? F (39?C) or lasts longer than 3 days  · You are confused  · You taste blood in your throat  Contact your healthcare provider if:   · Your throat pain gets worse  · You have a painful lump in your throat that does not go away after 5 days  · Your symptoms do not improve after 5 days  · You have questions or concerns about your condition or care  Medicines:  Viral pharyngitis will go away on its own without treatment  Your sore throat should start to feel better in 3 to 5 days for both viral and bacterial infections  You may need any of the following:  · Antibiotics  treat a bacterial infection  · NSAIDs , such as ibuprofen, help decrease swelling, pain, and fever  NSAIDs can cause stomach bleeding or kidney problems in certain people  If you take blood thinner medicine, always ask your healthcare provider if NSAIDs are safe for you  Always read the medicine label and follow directions  · Acetaminophen  decreases pain and fever  It is available without a doctor's order  Ask how much to take and how often to take it  Follow directions  Acetaminophen can cause liver damage if not taken correctly  · Take your medicine as directed  Contact your healthcare provider if you think your medicine is not helping or if you have side effects  Tell him or her if you are allergic to any medicine   Keep a list of the medicines, vitamins, and herbs you take  Include the amounts, and when and why you take them  Bring the list or the pill bottles to follow-up visits  Carry your medicine list with you in case of an emergency  Manage your symptoms:   · Gargle salt water  Mix ¼ teaspoon salt in an 8 ounce glass of warm water and gargle  This may help decrease swelling in your throat  · Drink liquids as directed  You may need to drink more liquids than usual  Liquids may help soothe your throat and prevent dehydration  Ask how much liquid to drink each day and which liquids are best for you  · Use a cool-steam humidifier  to help moisten the air in your room and calm your cough  · Soothe your throat  with cough drops, ice, soft foods, or popsicles  Prevent the spread of pharyngitis:  Cover your mouth and nose when you cough or sneeze  Do not share food or drinks  Wash your hands often  Use soap and water  If soap and water are unavailable, use an alcohol based hand   Follow up with your healthcare provider as directed:  Write down your questions so you remember to ask them during your visits  © 2017 [facility] Information is for End User's use only and may not be sold, redistributed or otherwise used for commercial purposes  All illustrations and images included in CareNotes® are the copyrighted property of A D A M [employer] Inc  or [employer]  The above information is an  only  It is not intended as medical advice for individual conditions or treatments  Talk to your doctor, nurse or pharmacist before following any medical regimen to see if it is safe and effective for you  Wheezing   WHAT YOU NEED TO KNOW:   Wheezing happens when air flows through a narrowed airway  Wheezing can happen when you breathe in, breathe out, or both  Wheezes may sound like a whistle, squeal, groan, or creak  Wheezes may also sound musical or high-pitched  Wheezing cannot be stopped by coughing   Asthma, allergies, or infection are the most common causes of wheezing  A foreign body, asthma, extra mucus, or smoking can also cause wheezing  DISCHARGE INSTRUCTIONS:   Call 911 if:   · You have sudden trouble breathing  · Your throat feels like it is swelling or feels tight  · You are dizzy, lightheaded, confused, or feel faint  · You have chest pain or tightness  Return to the emergency department if:   · You have shortness of breath  · You are coughing up blood  · You have chest pain  Contact your healthcare provider if:   · You have a fever  · Your wheezing does not get better or it gets worse  · You have questions or concerns about your condition or care  Medicines:   · Medicines  decrease inflammation, open airways, and make it easier to breathe  · Take your medicine as directed  Contact your healthcare provider if you think your medicine is not helping or if you have side effects  Tell him of her if you are allergic to any medicine  Keep a list of the medicines, vitamins, and herbs you take  Include the amounts, and when and why you take them  Bring the list or the pill bottles to follow-up visits  Carry your medicine list with you in case of an emergency  Follow up with your healthcare provider as directed: You may be referred to a specialist  Write down your questions so you remember to ask them during your visits  Manage your symptoms:   · Avoid allergy triggers , such as animals, grass, pollen, or dust     · Return to your usual activity as directed  You may need to limit certain activities until you follow up with your healthcare provider or your symptoms improve  Your child may need to avoid sports until his symptoms improve  · Take deep breaths and cough several times a day  This will decrease your risk for a lung infection and help decrease wheezing  Take a deep breath and hold it for as long as you can  Let the air out and then cough strongly   Deep breaths help open your airway  You may be given an incentive spirometer to help you take deep breaths  Put the plastic piece in your mouth and take a slow, deep breath, then let the air out and cough  Repeat these steps 10 times every hour  · Drink liquids as directed  You may need to drink more liquids than usual to thin your mucus and prevent dehydration  Ask how much liquid to drink each day and which liquids are best for you  © 2017 [address] [employer] Information is for End User's use only and may not be sold, redistributed or otherwise used for commercial purposes  All illustrations and images included in CareNotes® are the copyrighted property of A D A M , Inc  or [employer]  The above information is an  only  It is not intended as medical advice for individual conditions or treatments  Talk to your doctor, nurse or pharmacist before following any medical regimen to see if it is safe and effective for you

## 2017-12-08 NOTE — PROGRESS NOTES
Assessment    1  Never a smoker   2  DMII (diabetes mellitus, type 2) (250 00) (E11 9)    Plan  DMII (diabetes mellitus, type 2)    · Januvia 100 MG Oral Tablet   · Lisinopril 10 MG Oral Tablet   · MetFORMIN HCl - 1000 MG Oral Tablet   · Fluticasone Propionate 50 MCG/ACT Nasal Suspension; USE 2 SPRAYS INEACH NOSTRIL ONCE DAILY   · 2 - [place] Devan Cesar GASCA  (Podiatry) Co-Management  *  Status: Hold For - Scheduling Requested for: [id number]  Care Summary provided  : Yes   · (1) CBC/PLT/DIFF; Status:Active; Requested for:39Ela2836;    · (1) COMPREHENSIVE METABOLIC PANEL; Status:Active; Requested for:58Lvp1289;    · (1) HEMOGLOBIN A1C; Status:Active; Requested for:19Jun2048;    · (1) LIPID PANEL FASTING W DIRECT LDL REFLEX; Status:Active; Requestedfor:28Ztx8435;    · (1) MICROALBUMIN CREATININE RATIO, RANDOM URINE; Status:Active; Requestedfor:35Ohe2035;    · (1) TSH; Status:Active; Requested for:86Pdy2307;    · Follow-up visit in 3 months Evaluation and Treatment  Follow-up  Status: Hold For -Scheduling  Requested for: 88EFO5961  Screening for diabetic retinopathy    · *VB - Eye Exam; Status:Active - Retrospective Authorization; Requested for:24Cnj6440;     Discussion/Summary    Patient does not wish to restart his medications until he gets his blood work done today  We will call with results and make further recommendations  We will refer him to Podiatry and Ophthalmology  Follow-up in 3 months or p r n  [facility] Possible side effects of new medications were reviewed with the patient/guardian today  The treatment plan was reviewed with the patient/guardian  The patient/guardian understands and agrees with the treatment plan      Chief Complaint  Follow-up   Patient is here today for follow up of chronic conditions described in HPI  History of Present Illness  Patient here today for follow-up  Patient has not been taking any of his diabetic or blood pressure meds   No longer taking the Xarelto, because every time he did, he states he coughed up blood  Has been seen in the ER multiple times this past year, once in October for chest pain, PE study was normal  Was seen does this morning for chest cold, now on antibiotic  The patient states he has been stable with his Type II Diabetes control since the last visit  Complications: peripheral neuropathy  Symptoms: stable numbness of the feet  Medications: the patient remains on diet control only without medications at this time  Due For: a lipid panel,-- a urine microalbumin,-- a hemoglobin A1c,-- a retinal exam-- and-- a foot exam       Review of Systems   Constitutional: No fever or chills, feels well, no tiredness, no recent weight gain or weight loss  Cardiovascular: No complaints of slow heart rate, no fast heart rate, no chest pain, no palpitations, no leg claudication, no lower extremity  Respiratory: No complaints of shortness of breath, no wheezing, no cough, no SOB on exertion, no orthopnea or PND  Gastrointestinal: No complaints of abdominal pain, no constipation, no nausea or vomiting, no diarrhea or bloody stools  Genitourinary: No complaints of dysuria, no incontinence, no hesitancy, no nocturia, no genital lesion, no testicular pain  Endocrine: as noted in HPI  Active Problems  1  DMII (diabetes mellitus, type 2) (250 00) (E11 9)   2  Hematemesis (578 0) (K92 0)   3  Screening for diabetic retinopathy (V80 2) (Z13 5)    Past Medical History  1  Acute upper respiratory infection (465 9) (J06 9)   2  History of acute renal failure (V13 09) (Z87 448)   3  History of allergic rhinitis (V12 69) (Z87 09)   4  History of asthma (V12 69) (Z87 09)   5  History of bipolar disorder (V11 1) (Z86 59)   6  History of chest pain (V13 89) (Z87 898)   7  History of hematuria (V13 09) (Z87 448)   8  History of hepatitis B vaccination (V49 89) (Z92 29)   9  History of leukocytosis (V12 3) (Z86 2)   10  History of palpitations (V12 59) (Z87 898)   11   History of pulmonary embolism (V12 55) (Z86 711)   12  History of rectal bleeding (V12 79) (Z87 19)   13  History of sleep apnea (V13 89) (Z86 69)   14  History of Increased BMI (783 9) (R63 8)   15  History of Diana-Hussein tear (530 7) (K22 6)   16  History of Pain of upper extremity, unspecified laterality (729 5) (M79 603)   17  History of Pulmonary embolism (415 19) (I26 99)   18  History of Pulmonary Embolism (V12 51)   19  History of Screening for diabetic peripheral neuropathy (V80 09) (Z13 89)   20  History of Screening for glaucoma (V80 1) (Z13 5)   21  History of Screening for STD (sexually transmitted disease) (V74 5) (Z11 3)    The active problems and past medical history were reviewed and updated today  Surgical History  1  History of Nasal Septal Deviation Repair   2  History of Previous Stent Placement    The surgical history was reviewed and updated today  Family History  Mother    1  Family history of Cancer (199 1) (C80 1)   2  Family history of Diabetes mellitus (250 00) (E11 9)   3  Family history of Hypertension (401 9) (I10)  Father    4  Family history of Asthma (493 90) (J45 909)  Sibling    5  Family history of Colon cancer  Paternal Grandfather    10  Family history of Cancer (199 1) (C80 1)    The family history was reviewed and updated today  Social History     · Never a smoker   · Occasional alcohol use  The social history was reviewed and updated today  The social history was reviewed and is unchanged  Current Meds   1  Depakote 500 MG Oral Tablet Delayed Release; Take 1 tablet twice daily; Therapy: 47FLC9062 to Recorded   2  Fluticasone Propionate 50 MCG/ACT Nasal Suspension; USE 2 SPRAYS IN EACH NOSTRIL ONCE DAILY; Therapy: 53KBU2110 to (Last Rx:86Ksb7660)  Requested for: 90Tql5425 Ordered   3  Januvia 100 MG Oral Tablet; TAKE 1 TABLET BY MOUTH ONCE DAILY; Therapy: 42PMK3973 to (Evaluate:08Jan2018)  Requested for: 47Lcl3808; Last Rx:11Udf4427 Ordered   4   Lisinopril 10 MG Oral Tablet; take 1 tablet by mouth once daily; Therapy: 31LNH0095 to (Ingrid Riley)  Requested for: 12Jun2017; Last Rx:12Jun2017 Ordered   5  MetFORMIN HCl - 1000 MG Oral Tablet; take 1 tablet by mouth twice a day; Therapy: 02DOA9284 to (Ingrid Riley)  Requested for: 12Jun2017; Last Rx:12Jun2017 Ordered   6  Ventolin  (90 Base) MCG/ACT Inhalation Aerosol Solution; INHALE 2 PUFFS BY MOUTH EVERY 6 HOURS FOR WHEEZING; Therapy: 09Sqb4939 to (Evaluate:30Jan2018)  Requested for: 61LYR2014; Last Rx:30Jpp5892 Ordered    The medication list was reviewed and updated today  Allergies  1  Shellfish-derived Products    Vitals  Vital Signs    Recorded: 60FPM8513 73:33IJ   Systolic 522   Diastolic 80   Height 5 ft 8 in   Weight 280 lb 4 oz   BMI Calculated 42 61   BSA Calculated 2 36       Physical Exam   Constitutional  General appearance: No acute distress, well appearing and well nourished  Pulmonary  Respiratory effort: No increased work of breathing or signs of respiratory distress  Auscultation of lungs: Clear to auscultation, equal breath sounds bilaterally, no wheezes, no rales, no rhonci  Cardiovascular  Auscultation of heart: Normal rate and rhythm, normal S1 and S2, without murmurs  Examination of extremities for edema and/or varicosities: Normal    Psychiatric  Orientation to person, place and time: Normal    Mood and affect: Normal        Socks and shoes removed, Right Foot Findings: normal foot, no swelling, no erythema  Diminished tactile sensation with monofilament testing throughout the right foot  Socks and shoes removed, Left Foot Findings: normal foot, no swelling, no erythema  Diminished tactile sensation with monofilament testing throughout the left foot  Pulses:  1+ in the dorsalis pedis on the right  Pulses:  1+ in the dorsalis pedis on the left  Assign Risk Category: 2: Loss of protective sensation with or without weakness, deformity, callus, pre-ulcer, or history of ulceration  High risk  Results/Data  PHQ-2 Adult Depression Screening 09Bim6507 11:13AM User, Ahs     Test Name Result Flag Reference   PHQ-2 Adult Depression Score 0       Over the last two weeks, how often have you been bothered by any of the following problems?  Little interest or pleasure in doing things: Not at all - 0 Feeling down, depressed, or hopeless: Not at all - 0   PHQ-2 Adult Depression Screening Negative           Signatures   Electronically signed by : Bia Menjivar DO; Dec  7 2017 11:34AM EST                       (Author)

## 2018-01-10 ENCOUNTER — TRANSCRIBE ORDERS (OUTPATIENT)
Dept: LAB | Facility: MEDICAL CENTER | Age: 43
End: 2018-01-10

## 2018-01-10 ENCOUNTER — APPOINTMENT (OUTPATIENT)
Dept: LAB | Facility: MEDICAL CENTER | Age: 43
End: 2018-01-10
Payer: COMMERCIAL

## 2018-01-10 DIAGNOSIS — Z20.5 CONTACT WITH AND (SUSPECTED) EXPOSURE TO VIRAL HEPATITIS (CODE): ICD-10-CM

## 2018-01-10 DIAGNOSIS — Z20.6 CONTACT WITH AND (SUSPECTED) EXPOSURE TO HUMAN IMMUNODEFICIENCY VIRUS (HIV): ICD-10-CM

## 2018-01-10 PROCEDURE — 87389 HIV-1 AG W/HIV-1&-2 AB AG IA: CPT

## 2018-01-10 PROCEDURE — 86803 HEPATITIS C AB TEST: CPT

## 2018-01-10 PROCEDURE — 36415 COLL VENOUS BLD VENIPUNCTURE: CPT

## 2018-01-11 ENCOUNTER — GENERIC CONVERSION - ENCOUNTER (OUTPATIENT)
Dept: OTHER | Facility: OTHER | Age: 43
End: 2018-01-11

## 2018-01-11 LAB — HCV AB SER QL: NORMAL

## 2018-01-12 ENCOUNTER — GENERIC CONVERSION - ENCOUNTER (OUTPATIENT)
Dept: OTHER | Facility: OTHER | Age: 43
End: 2018-01-12

## 2018-01-12 LAB — HIV 1+2 AB+HIV1 P24 AG SERPL QL IA: NORMAL

## 2018-01-15 NOTE — RESULT NOTES
Verified Results  (1) HEMOGLOBIN A1C [id number] 01:00PM [facility]     Test Name Result Flag Reference   HEMOGLOBIN A1C 8 0 % H 4 2-6 3   EST  AVG  GLUCOSE 183 mg/dl       (1) COMPREHENSIVE METABOLIC PANEL [id number] 48:76SB [facility]     Test Name Result Flag Reference   GLUCOSE,RANDM 144 mg/dL H    If the patient is fasting, the ADA then defines impaired fasting glucose as > 100 mg/dL and diabetes as > or equal to 123 mg/dL  SODIUM 136 mmol/L  136-145   POTASSIUM 4 3 mmol/L  3 5-5 3   CHLORIDE 100 mmol/L  100-108   CARBON DIOXIDE 26 mmol/L  21-32   ANION GAP (CALC) 10 mmol/L  4-13   BLOOD UREA NITROGEN 15 mg/dL  5-25   CREATININE 0 77 mg/dL  0 60-1 30   Standardized to IDMS reference method   CALCIUM 8 9 mg/dL  8 3-10 1   BILI, TOTAL 0 36 mg/dL  0 20-1 00   ALK PHOSPHATAS 54 U/L     ALT (SGPT) 31 U/L  12-78   AST(SGOT) 9 U/L  5-45   ALBUMIN 3 4 g/dL L 3 5-5 0   TOTAL PROTEIN 7 3 g/dL  6 4-8 2   eGFR Non-African American      >60 0 ml/min/1 73sq [facility] [employer] Disease Education Program recommendations are as follows:  GFR calculation is accurate only with a steady state creatinine  Chronic Kidney disease less than 60 ml/min/1 73 sq  meters  Kidney failure less than 15 ml/min/1 73 sq  meters  (1) LIPID PANEL, FASTING [id number] 12:59PM [facility]     Test Name Result Flag Reference   CHOLESTEROL 130 mg/dL     HDL,DIRECT 32 mg/dL L 40-60   Specimen collection should occur prior to Metamizole administration due to the potential for falsely depressed results     LDL CHOLESTEROL CALCULATED 62 mg/dL  0-100   Triglyceride:         Normal              <150 mg/dl       Borderline High    150-199 mg/dl       High               200-499 mg/dl       Very High          >499 mg/dl  Cholesterol:         Desirable        <200 mg/dl      Borderline High  200-239 mg/dl      High             >239 mg/dl  HDL Cholesterol:        High    >59 mg/dL      Low     <41 mg/dL  LDL CALCULATED:    This screening LDL is a calculated result  It does not have the accuracy of the Direct Measured LDL in the monitoring of patients with hyperlipidemia and/or statin therapy  Direct Measure LDL (SMS775) must be ordered separately in these patients  TRIGLYCERIDES 180 mg/dL H <=150   Specimen collection should occur prior to N-Acetylcysteine or Metamizole administration due to the potential for falsely depressed results  (1) TSH [id number] 12:59PM Stefani Sins     Test Name Result Flag Reference   TSH 1 610 uIU/mL  0 358-3 740   Patients undergoing fluorescein dye angiography may retain small amounts of fluorescein in the body for 48-72 hours post procedure  Samples containing fluorescein can produce falsely depressed TSH values  If the patient had this procedure,a specimen should be resubmitted post fluorescein clearance       (1) VALPROIC ACID (DEPAKOTE) [id number] 12:59PM Stefani Sins     Test Name Result Flag Reference   VALPROIC ACID 86 ug/mL         Plan  DMII (diabetes mellitus, type 2)    · Januvia 100 MG Oral Tablet; TAKE 1 TABLET BY MOUTH ONCE DAILY

## 2018-01-15 NOTE — RESULT NOTES
Verified Results  (1) ACUTE HEPATITIS PANEL [id number] 05:03PM Mitcheal Gallon Order Number: [id number]     Test Name Result Flag Reference   HEPATITIS B SURFACE ANTIGEN Non-reactive  Non-reactive, NonReactive - Confirmed   HEPATITIS A IGM ANTIBODY Non-reactive  Non-reactive, Equivocal-Suggest Recollect   HEPATITIS C ANTIBODY Non-reactive  Non-reactive   HEPATITIS B CORE IGM ANTIBODY Non-reactive  Non-reactive     (1) HIV AG/AB [facility] [id number] 05:03PM [facility]on Order Number: [id number]     Test Name Result Flag Reference   HIV 1/2 AND P24 Non-Reactive  Non-Reactive   This test detects HIV 1, HIV2 and p24 Antigen       (1) RPR [id number] 05:03PM [facility]on Order Number: [id number]     Test Name Result Flag Reference   RPR Non-Reactive  Non-Reactive

## 2018-01-16 NOTE — RESULT NOTES
Verified Results  (1) CHLAMYDIA/GC AMPLIFIED DNA, PCR [id number] 05:03PM Mukesh Gallo Order Number: [id number]     Test Name Result Flag Reference   CHLAMYDIA,AMPLIFIED DNA PROBE   C  trachomatis Amplified DNA Negative   C  trachomatis Amplified DNA Negative   For optimal microbe detection, urine samples should be a first catch specimen (20-60 ml of urine)  Patient should not have urinated for at least 1 hour prior to collection  A specimen not collected in this manner may have falsely negative results     N  GONORRHOEAE AMPLIFIED DNA   N  gonorrhoeae Amplified DNA Negative   N  gonorrhoeae Amplified DNA Negative

## 2018-01-18 ENCOUNTER — TRANSCRIBE ORDERS (OUTPATIENT)
Dept: LAB | Facility: MEDICAL CENTER | Age: 43
End: 2018-01-18

## 2018-01-18 ENCOUNTER — APPOINTMENT (OUTPATIENT)
Dept: LAB | Facility: MEDICAL CENTER | Age: 43
End: 2018-01-18
Payer: COMMERCIAL

## 2018-01-18 DIAGNOSIS — Z79.899 ENCOUNTER FOR LONG-TERM (CURRENT) USE OF HIGH-RISK MEDICATION: Primary | ICD-10-CM

## 2018-01-18 DIAGNOSIS — Z79.899 ENCOUNTER FOR LONG-TERM (CURRENT) USE OF HIGH-RISK MEDICATION: ICD-10-CM

## 2018-01-18 LAB
ALBUMIN SERPL BCP-MCNC: 3.5 G/DL (ref 3.5–5)
ALP SERPL-CCNC: 40 U/L (ref 46–116)
ALT SERPL W P-5'-P-CCNC: 55 U/L (ref 12–78)
ANION GAP SERPL CALCULATED.3IONS-SCNC: 6 MMOL/L (ref 4–13)
AST SERPL W P-5'-P-CCNC: 35 U/L (ref 5–45)
BASOPHILS # BLD AUTO: 0.02 THOUSANDS/ΜL (ref 0–0.1)
BASOPHILS NFR BLD AUTO: 0 % (ref 0–1)
BILIRUB SERPL-MCNC: 0.32 MG/DL (ref 0.2–1)
BUN SERPL-MCNC: 23 MG/DL (ref 5–25)
CALCIUM SERPL-MCNC: 9.2 MG/DL (ref 8.3–10.1)
CHLORIDE SERPL-SCNC: 104 MMOL/L (ref 100–108)
CHOLEST SERPL-MCNC: 95 MG/DL (ref 50–200)
CO2 SERPL-SCNC: 27 MMOL/L (ref 21–32)
CREAT SERPL-MCNC: 0.65 MG/DL (ref 0.6–1.3)
EOSINOPHIL # BLD AUTO: 0.21 THOUSAND/ΜL (ref 0–0.61)
EOSINOPHIL NFR BLD AUTO: 3 % (ref 0–6)
ERYTHROCYTE [DISTWIDTH] IN BLOOD BY AUTOMATED COUNT: 14.4 % (ref 11.6–15.1)
GFR SERPL CREATININE-BSD FRML MDRD: 120 ML/MIN/1.73SQ M
GLUCOSE SERPL-MCNC: 112 MG/DL (ref 65–140)
HCT VFR BLD AUTO: 40 % (ref 36.5–49.3)
HDLC SERPL-MCNC: 37 MG/DL (ref 40–60)
HGB BLD-MCNC: 13.6 G/DL (ref 12–17)
LDLC SERPL CALC-MCNC: 36 MG/DL (ref 0–100)
LYMPHOCYTES # BLD AUTO: 2.96 THOUSANDS/ΜL (ref 0.6–4.47)
LYMPHOCYTES NFR BLD AUTO: 36 % (ref 14–44)
MCH RBC QN AUTO: 29.5 PG (ref 26.8–34.3)
MCHC RBC AUTO-ENTMCNC: 34 G/DL (ref 31.4–37.4)
MCV RBC AUTO: 87 FL (ref 82–98)
MONOCYTES # BLD AUTO: 0.52 THOUSAND/ΜL (ref 0.17–1.22)
MONOCYTES NFR BLD AUTO: 6 % (ref 4–12)
NEUTROPHILS # BLD AUTO: 4.51 THOUSANDS/ΜL (ref 1.85–7.62)
NEUTS SEG NFR BLD AUTO: 55 % (ref 43–75)
NRBC BLD AUTO-RTO: 0 /100 WBCS
PLATELET # BLD AUTO: 199 THOUSANDS/UL (ref 149–390)
PMV BLD AUTO: 9.8 FL (ref 8.9–12.7)
POTASSIUM SERPL-SCNC: 3.9 MMOL/L (ref 3.5–5.3)
PROT SERPL-MCNC: 7.7 G/DL (ref 6.4–8.2)
RBC # BLD AUTO: 4.61 MILLION/UL (ref 3.88–5.62)
SODIUM SERPL-SCNC: 137 MMOL/L (ref 136–145)
TRIGL SERPL-MCNC: 108 MG/DL
TSH SERPL DL<=0.05 MIU/L-ACNC: 1.41 UIU/ML (ref 0.36–3.74)
VALPROATE SERPL-MCNC: 64 UG/ML (ref 50–100)
WBC # BLD AUTO: 8.24 THOUSAND/UL (ref 4.31–10.16)

## 2018-01-18 PROCEDURE — 36415 COLL VENOUS BLD VENIPUNCTURE: CPT

## 2018-01-18 PROCEDURE — 80053 COMPREHEN METABOLIC PANEL: CPT

## 2018-01-18 PROCEDURE — 84443 ASSAY THYROID STIM HORMONE: CPT

## 2018-01-18 PROCEDURE — 80164 ASSAY DIPROPYLACETIC ACD TOT: CPT

## 2018-01-18 PROCEDURE — 85025 COMPLETE CBC W/AUTO DIFF WBC: CPT

## 2018-01-18 PROCEDURE — 80061 LIPID PANEL: CPT

## 2018-01-23 VITALS
BODY MASS INDEX: 42.47 KG/M2 | HEIGHT: 68 IN | DIASTOLIC BLOOD PRESSURE: 80 MMHG | SYSTOLIC BLOOD PRESSURE: 122 MMHG | WEIGHT: 280.25 LBS

## 2018-01-23 NOTE — RESULT NOTES
Verified Results  (1) CBC/PLT/DIFF [id number] 11:30AM Jono Willard Order Number: [id number]     Test Name Result Flag Reference   WBC COUNT 11 36 Thousand/uL H 4 31-10 16   RBC COUNT 5 05 Million/uL  3 88-5 62   HEMOGLOBIN 14 8 g/dL  12 0-17 0   HEMATOCRIT 43 6 %  36 5-49 3   MCV 86 fL  82-98   MCH 29 3 pg  26 8-34 3   MCHC 33 9 g/dL  31 4-37 4   RDW 13 9 %  11 6-15 1   MPV 9 7 fL  8 9-12 7   PLATELET COUNT 388 Thousands/uL  149-390   nRBC AUTOMATED 0 /100 WBCs     NEUTROPHILS RELATIVE PERCENT 67 %  43-75   LYMPHOCYTES RELATIVE PERCENT 23 %  14-44   MONOCYTES RELATIVE PERCENT 6 %  4-12   EOSINOPHILS RELATIVE PERCENT 4 %  0-6   BASOPHILS RELATIVE PERCENT 0 %  0-1   NEUTROPHILS ABSOLUTE COUNT 7 53 Thousands/? ??L  1 85-7 62   LYMPHOCYTES ABSOLUTE COUNT 2 62 Thousands/? ??L  0 60-4 47   MONOCYTES ABSOLUTE COUNT 0 67 Thousand/? ??L  0 17-1 22   EOSINOPHILS ABSOLUTE COUNT 0 46 Thousand/? ??L  0 00-0 61   BASOPHILS ABSOLUTE COUNT 0 02 Thousands/? ??L  0 00-0 10     (1) COMPREHENSIVE METABOLIC PANEL [id number] 28:21TH Jono Willard Order Number: [id number]     Test Name Result Flag Reference   SODIUM 133 mmol/L L 136-145   POTASSIUM 4 1 mmol/L  3 5-5 3   CHLORIDE 97 mmol/L L 100-108   CARBON DIOXIDE 27 mmol/L  21-32   ANION GAP (CALC) 9 mmol/L  4-13   BLOOD UREA NITROGEN 16 mg/dL  5-25   CREATININE 0 81 mg/dL  0 60-1 30   Standardized to IDMS reference method   CALCIUM 9 8 mg/dL  8 3-10 1   BILI, TOTAL 0 56 mg/dL  0 20-1 00   ALK PHOSPHATAS 52 U/L     ALT (SGPT) 44 U/L  12-78   Specimen collection should occur prior to Sulfasalazine and/or Sulfapyridine administration due to the potential for falsely depressed results  AST(SGOT) 29 U/L  5-45   Specimen collection should occur prior to Sulfasalazine administration due to the potential for falsely depressed results     ALBUMIN 3 6 g/dL  3 5-5 0   TOTAL PROTEIN 8 5 g/dL H 6 4-8 2   eGFR 110 ml/min/1 73sq m     National Kidney Disease Education Program recommendations are as follows:  GFR calculation is accurate only with a steady state creatinine  Chronic Kidney disease less than 60 ml/min/1 73 sq  meters  Kidney failure less than 15 ml/min/1 73 sq  meters  GLUCOSE FASTING 155 mg/dL H 65-99   Specimen collection should occur prior to Sulfasalazine administration due to the potential for falsely depressed results  Specimen collection should occur prior to Sulfapyridine administration due to the potential for falsely elevated results  (1) HEMOGLOBIN A1C [id number] 11:30AM Denia Costelloon Order Number: [id number]     Test Name Result Flag Reference   HEMOGLOBIN A1C 7 5 % H 4 2-6 3   EST  AVG  GLUCOSE 169 mg/dl       (1) LIPID PANEL FASTING W DIRECT LDL REFLEX [id number] 11:30AM Deniawilson Salazar Order Number: [id number]     Test Name Result Flag Reference   CHOLESTEROL 115 mg/dL     LDL CHOLESTEROL CALCULATED 51 mg/dL  0-100   Triglyceride:        Normal <150 mg/dl   Borderline High 150-199 mg/dl   High 200-499 mg/dl   Very High >499 mg/dl      Cholesterol:       Desirable <200 mg/dl    Borderline High 200-239 mg/dl    High >239 mg/dl      HDL Cholesterol:       High>59 mg/dL    Low <41 mg/dL      HDL Cholesterol:       High>59 mg/dL    Low <41 mg/dL      This screening LDL is a calculated result  It does not have the accuracy of the Direct Measured LDL in the monitoring of patients with hyperlipidemia and/or statin therapy  Direct Measure LDL (DQF381) must be ordered separately in these patients  TRIGLYCERIDES 151 mg/dL H <=150   Specimen collection should occur prior to N-Acetylcysteine or Metamizole administration due to the potential for falsely depressed results  HDL,DIRECT 34 mg/dL L 40-60   Specimen collection should occur prior to Metamizole administration due to the potential for falsley depressed results       (1) MICROALBUMIN CREATININE RATIO, RANDOM URINE [id number] 11:30AM Uday Shin TW Order Number: [id number]     Test Name Result Flag Reference   MICROALBUMIN/ CREAT R 30 mg/g creatinine  0-30   MICROALBUMIN,URINE 96 8 mg/L H 0 0-20 0   CREATININE URINE 327 0 mg/dL       (1) TSH [id number] 11:30AM Tori Patel Order Number: [id number]     Test Name Result Flag Reference   TSH 2 050 uIU/mL  0 358-3 740   Patients undergoing fluorescein dye angiography may retain small amounts of fluorescein in the body for 48-72 hours post procedure  Samples containing fluorescein can produce falsely depressed TSH values  If the patient had this procedure,a specimen should be resubmitted post fluorescein clearance         Plan  DMII (diabetes mellitus, type 2)    · Atorvastatin Calcium 10 MG Oral Tablet; take 1 tablet by mouth once daily   · Lisinopril 10 MG Oral Tablet; take 1 tablet by mouth once daily   · MetFORMIN HCl - 1000 MG Oral Tablet; take 1 tablet by mouth twice a day

## 2018-01-26 LAB
LEFT EYE DIABETIC RETINOPATHY: NORMAL
RIGHT EYE DIABETIC RETINOPATHY: NORMAL
SEVERITY (EYE EXAM): NORMAL

## 2018-02-14 DIAGNOSIS — E11.9 TYPE 2 DIABETES MELLITUS WITHOUT COMPLICATION, WITHOUT LONG-TERM CURRENT USE OF INSULIN (HCC): Primary | ICD-10-CM

## 2018-02-26 NOTE — RESULT NOTES
Verified Results  (1) HIV AG/AB Derrick Spicer GEN [id number] 10:19AM Mey Huffman Order Number: [id number]     Test Name Result Flag Reference   HIV 1/2 AND P24 Non-Reactive  Non-Reactive   This test detects HIV 1, HIV2 and p24 Antigen

## 2018-02-26 NOTE — RESULT NOTES
Verified Results  (1) HEP C ANTIBODY [id number] 10:19AM Dickson Camel Order Number: [id number]     Test Name Result Flag Reference   HEPATITIS C ANTIBODY Non-reactive  Non-reactive

## 2018-03-08 ENCOUNTER — TELEPHONE (OUTPATIENT)
Dept: FAMILY MEDICINE CLINIC | Facility: CLINIC | Age: 43
End: 2018-03-08

## 2018-03-08 NOTE — TELEPHONE ENCOUNTER
Daphnie from Protective services wants to know how patient is doing with keeping his appts and such and if there are any signs of abuse or neglect that you would like to report?     We can return her call

## 2018-03-24 DIAGNOSIS — E11.8 TYPE 2 DIABETES MELLITUS WITH COMPLICATION, WITHOUT LONG-TERM CURRENT USE OF INSULIN (HCC): Primary | ICD-10-CM

## 2018-03-26 RX ORDER — SITAGLIPTIN 100 MG/1
TABLET, FILM COATED ORAL
Qty: 30 TABLET | Refills: 5 | Status: SHIPPED | OUTPATIENT
Start: 2018-03-26 | End: 2019-01-11 | Stop reason: SDUPTHER

## 2018-06-21 ENCOUNTER — TELEPHONE (OUTPATIENT)
Dept: FAMILY MEDICINE CLINIC | Facility: CLINIC | Age: 43
End: 2018-06-21

## 2018-06-21 DIAGNOSIS — J06.9 UPPER RESPIRATORY TRACT INFECTION, UNSPECIFIED TYPE: Primary | ICD-10-CM

## 2018-06-21 RX ORDER — AMOXICILLIN 500 MG/1
CAPSULE ORAL
Qty: 40 CAPSULE | Refills: 0 | Status: SHIPPED | OUTPATIENT
Start: 2018-06-21 | End: 2018-07-01

## 2018-06-21 NOTE — TELEPHONE ENCOUNTER
Patient was unable to make apt due to no car, wondering if you can call in antibiotic for him, cough, chest congestion and sinus pressure with other cold like symptoms  Father will  script later

## 2018-06-21 NOTE — TELEPHONE ENCOUNTER
I put in for amoxicillin  Pickup Mucinex DM over the counter  Push fluids    Make appointment if symptoms continue or increase

## 2018-08-13 ENCOUNTER — HOSPITAL ENCOUNTER (EMERGENCY)
Facility: HOSPITAL | Age: 43
Discharge: HOME | End: 2018-08-13
Attending: EMERGENCY MEDICINE | Admitting: EMERGENCY MEDICINE
Payer: COMMERCIAL

## 2018-08-13 ENCOUNTER — APPOINTMENT (EMERGENCY)
Dept: RADIOLOGY | Facility: HOSPITAL | Age: 43
End: 2018-08-13
Payer: COMMERCIAL

## 2018-08-13 VITALS
TEMPERATURE: 97.3 F | WEIGHT: 270.5 LBS | RESPIRATION RATE: 18 BRPM | BODY MASS INDEX: 41 KG/M2 | DIASTOLIC BLOOD PRESSURE: 78 MMHG | OXYGEN SATURATION: 97 % | SYSTOLIC BLOOD PRESSURE: 156 MMHG | HEART RATE: 76 BPM | HEIGHT: 68 IN

## 2018-08-13 DIAGNOSIS — M77.8 LEFT SHOULDER TENDONITIS: Primary | ICD-10-CM

## 2018-08-13 PROCEDURE — 73030 X-RAY EXAM OF SHOULDER: CPT

## 2018-08-13 PROCEDURE — 99283 EMERGENCY DEPT VISIT LOW MDM: CPT

## 2018-08-13 RX ORDER — NAPROXEN 500 MG/1
500 TABLET ORAL
Qty: 30 TABLET | Refills: 0 | Status: SHIPPED | OUTPATIENT
Start: 2018-08-13 | End: 2019-10-29

## 2018-08-13 RX ORDER — ASPIRIN 81 MG/1
81 TABLET, CHEWABLE ORAL DAILY
COMMUNITY

## 2018-08-13 RX ORDER — NAPROXEN 250 MG/1
500 TABLET ORAL ONCE
Status: COMPLETED | OUTPATIENT
Start: 2018-08-13 | End: 2018-08-13

## 2018-08-13 RX ADMIN — NAPROXEN 500 MG: 250 TABLET ORAL at 14:57

## 2018-08-13 NOTE — ED PROVIDER NOTES
History  Chief Complaint   Patient presents with    Shoulder Injury     Pt reports he hit his left shoulder off the door frame "about 2 wks ago" Pt reports he has been using it and working on the house and it has been increasingly getting worse  Pt gives hx of approx  2 weeks ago hit left shoulder on door frame  Pt relates last few days  With increased activity at his work has had increased pain /stiffness left shoulder  No radiation neck   nO numbness/tingling of ext  No CP or SOB  No headaches  Pt has tried no meds or local tx   History provided by:  Patient  Shoulder Pain   Location:  Shoulder  Shoulder location:  L shoulder  Injury: yes    Pain details:     Quality:  Shooting and aching    Radiates to:  Does not radiate    Timing:  Constant    Progression:  Waxing and waning  Dislocation: no    Prior injury to area:  Yes  Relieved by:  Rest  Worsened by: Movement  Ineffective treatments:  None tried  Associated symptoms: decreased range of motion and stiffness    Associated symptoms: no back pain, no fatigue, no fever, no muscle weakness, no neck pain, no numbness, no swelling and no tingling    Risk factors: no concern for non-accidental trauma, no frequent fractures and no recent illness        Prior to Admission Medications   Prescriptions Last Dose Informant Patient Reported? Taking? JANUVIA 100 MG tablet   No Yes   Sig: take 1 tablet by mouth once daily   albuterol (PROVENTIL HFA,VENTOLIN HFA) 90 mcg/act inhaler   No Yes   Sig: Inhale 2 puffs every 4 (four) hours as needed for wheezing   aspirin 81 mg chewable tablet   Yes Yes   Sig: Chew 81 mg daily   atorvastatin (LIPITOR) 10 mg tablet   Yes Yes   Sig: Take 10 mg by mouth daily  divalproex sodium (DEPAKOTE) 500 mg EC tablet   Yes Yes   Sig: Take 500 mg by mouth 2 (two) times a day  lisinopril (ZESTRIL) 10 mg tablet   Yes Yes   Sig: Take 10 mg by mouth daily     metFORMIN (GLUCOPHAGE) 1000 MG tablet   No Yes   Sig: take 1 tablet by mouth twice a day      Facility-Administered Medications: None       Past Medical History:   Diagnosis Date    Allergic rhinitis     resolved 6/20/17    Asthma     resolved 6/20/17    Bipolar 1 disorder ([facility] )     resolved 6/20/17    Chronic kidney disease     Diabetes mellitus ([facility] )     Hematuria     resolved 6/20/17    Hyperlipidemia     Leukocytosis     resolved 6/20/17    Diana-Hussein tear     Pulmonary emboli ([facility] )     resolved 6/20/17    Seizures ([facility] )     Sleep apnea     resolved 6/20/17       Past Surgical History:   Procedure Laterality Date    ANGIOPLASTY      previous stent placement    CARDIAC SURGERY      NASAL SEPTUM SURGERY         Family History   Problem Relation Age of Onset    Cancer Mother     Diabetes Mother     Hypertension Mother     Asthma Father     Cancer Paternal Grandfather     Colon cancer Family      I have reviewed and agree with the history as documented  Social History   Substance Use Topics    Smoking status: Never Smoker    Smokeless tobacco: Current User     Types: Chew    Alcohol use No      Comment: occasional as per [employer]        Review of Systems   Constitutional: Negative  Negative for appetite change, chills, diaphoresis, fatigue and fever  HENT: Negative  Negative for congestion, drooling, ear pain, facial swelling, sore throat, trouble swallowing and voice change  Eyes: Negative  Negative for pain and visual disturbance  Respiratory: Negative  Negative for choking, chest tightness and shortness of breath  Cardiovascular: Negative  Negative for chest pain and palpitations  Gastrointestinal: Negative  Negative for abdominal pain, diarrhea, nausea and vomiting  Genitourinary: Negative  Negative for dysuria, flank pain and hematuria  Musculoskeletal: Positive for stiffness  Negative for back pain, gait problem, joint swelling, neck pain and neck stiffness  Skin: Negative  Negative for pallor, rash and wound  Neurological: Negative  Negative for dizziness, syncope, facial asymmetry, light-headedness, numbness and headaches  Hematological: Does not bruise/bleed easily  Psychiatric/Behavioral: Negative  All other systems reviewed and are negative  Physical Exam  Physical Exam   Constitutional: He is oriented to person, place, and time  He appears well-developed and well-nourished  He is active and cooperative  Non-toxic appearance  He does not have a sickly appearance  He does not appear ill  No distress  HENT:   Head: Normocephalic and atraumatic  Head is without raccoon's eyes, without Lion's sign, without abrasion and without contusion  Right Ear: Hearing, tympanic membrane and ear canal normal    Left Ear: Hearing, tympanic membrane and ear canal normal    Mouth/Throat: Oropharynx is clear and moist  Mucous membranes are not dry and not cyanotic  No oropharyngeal exudate, posterior oropharyngeal edema or posterior oropharyngeal erythema  Eyes: Conjunctivae and EOM are normal  Pupils are equal, round, and reactive to light  Neck: Normal range of motion and phonation normal  Neck supple  No JVD present  No spinous process tenderness present  Cardiovascular: Normal rate, regular rhythm, intact distal pulses and normal pulses  No extrasystoles are present  No perf edema or calf tenderness   Pulmonary/Chest: Effort normal  No stridor  No respiratory distress  He has no wheezes  He has no rhonchi  He has no rales  Abdominal: Soft  Bowel sounds are normal  There is no tenderness  There is no rigidity, no guarding and no CVA tenderness  Musculoskeletal:        Right shoulder: Normal         Left shoulder: He exhibits decreased range of motion and tenderness  He exhibits no swelling, no effusion, no crepitus, no deformity and normal pulse  Left elbow: Normal         Left wrist: Normal         Cervical back: Normal  He exhibits no tenderness and normal pulse          Thoracic back: Normal         Lumbar back: Normal  He exhibits normal pulse  Left upper arm: Normal         Left forearm: Normal         Arms:  Neurological: He is alert and oriented to person, place, and time  He has normal strength and normal reflexes  No cranial nerve deficit  Skin: Skin is warm and dry  No abrasion, no bruising, no petechiae and no rash noted  He is not diaphoretic  No cyanosis or erythema  No pallor  Psychiatric: He has a normal mood and affect  His speech is normal and behavior is normal  Thought content normal  Cognition and memory are normal    Vitals reviewed  Vital Signs  ED Triage Vitals [08/13/18 1406]   Temperature Pulse Respirations Blood Pressure SpO2   (!) 97 3 °F (36 3 °C) 76 18 156/78 97 %      Temp Source Heart Rate Source Patient Position - Orthostatic VS BP Location FiO2 (%)   Temporal Monitor Sitting Right arm --      Pain Score       8           Vitals:    08/13/18 1406   BP: 156/78   Pulse: 76   Patient Position - Orthostatic VS: Sitting       Visual Acuity      ED Medications  Medications   naproxen (NAPROSYN) tablet 500 mg (500 mg Oral Given 8/13/18 1457)       Diagnostic Studies  Results Reviewed     None                 XR shoulder 2+ views LEFT   Final Result by Oli Munoz MD (08/13 1618)      No acute osseous abnormality  Workstation performed: [id number]                    Procedures  Procedures       Phone Contacts  ED Phone Contact    ED Course                               MDM  CritCare Time    Disposition  Final diagnoses:   Left shoulder tendonitis     Time reflects when diagnosis was documented in both MDM as applicable and the Disposition within this note     Time User Action Codes Description Comment    8/13/2018  2:51 PM Manuelito Harrell Add [A08 73] Left shoulder tendonitis       ED Disposition     ED Disposition Condition Comment    Discharge  Manpreet Contreras discharge to home/self care      Condition at discharge: Stable        Follow-up Information     Follow up With Specialties Details Why [address], [address]   99 [facility]  [address]  [address]      Alli Fierro MD Orthopedic Surgery  As needed 2018 [address],  [address] [facility]  [phone]            Discharge Medication List as of 8/13/2018  2:54 PM      START taking these medications    Details   naproxen (NAPROSYN) 500 mg tablet Take 1 tablet (500 mg total) by mouth 3 (three) times a day with meals, Starting Mon 8/13/2018, Normal         CONTINUE these medications which have NOT CHANGED    Details   albuterol (PROVENTIL HFA,VENTOLIN HFA) 90 mcg/act inhaler Inhale 2 puffs every 4 (four) hours as needed for wheezing, Starting Thu 12/7/2017, Until Fri 12/7/2018, Print      aspirin 81 mg chewable tablet Chew 81 mg daily, Historical Med      atorvastatin (LIPITOR) 10 mg tablet Take 10 mg by mouth daily  , Until Discontinued, Historical Med      divalproex sodium (DEPAKOTE) 500 mg EC tablet Take 500 mg by mouth 2 (two) times a day , Until Discontinued, Historical Med      JANUVIA 100 MG tablet take 1 tablet by mouth once daily, Normal      lisinopril (ZESTRIL) 10 mg tablet Take 10 mg by mouth daily  , Until Discontinued, Historical Med      metFORMIN (GLUCOPHAGE) 1000 MG tablet take 1 tablet by mouth twice a day, Normal           No discharge procedures on file      ED Provider  Electronically Signed by           Colton Trujillo,   08/14/18 9571

## 2018-08-13 NOTE — DISCHARGE INSTRUCTIONS
Tendinitis   WHAT YOU NEED TO KNOW:   Tendinitis is painful inflammation or breakdown of your tendons  It may also be called tendinopathy  Tendinitis often occurs in the knee, shoulder, ankle, hip, or elbow  DISCHARGE INSTRUCTIONS:   Medicines:   · Pain medicines  such as acetaminophen or NSAIDs may decrease swelling and pain or fever  These medicines are available without a doctor's order  Ask which medicine to take  Ask how much to take and when to take it  Follow directions  Acetaminophen and NSAIDs can cause liver or kidney damage if not taken correctly  If you take blood thinner medicine, always ask your healthcare provider if NSAIDs are safe for you  Always read the medicine label and follow the directions on it before using these medicine  · Take your medicine as directed  Contact your healthcare provider if you think your medicine is not helping or if you have side effects  Tell him if you are allergic to any medicine  Keep a list of the medicines, vitamins, and herbs you take  Include the amounts, and when and why you take them  Bring the list or the pill bottles to follow-up visits  Carry your medicine list with you in case of an emergency  Management:   · Rest  your tendon as directed to help it heal  Ask your healthcare provider if you need to stop putting weight on your affected area  · Ice  helps decrease swelling and pain  Ice may also help prevent tissue damage  Use an ice pack, or put crushed ice in a plastic bag  Cover it with a towel and place it on the affected area for 10 to 15 minutes every hour or as directed  · Support devices  such as a cane, splint, shoe insert, or brace may help reduce your pain  · Physical therapy  may be ordered by your healthcare provider  This may be used to teach you exercises to help improve movement and strength, and to decrease pain  You may also learn how to improve your posture, and how to lift or exercise correctly    Prevention: · Stretch and warm up  before you exercise  · Exercise regularly  to strengthen the muscles around your joint  Ease into an exercise routine for the first 3 weeks to prevent another injury  Ask your healthcare provider about the best exercise plan for you  Rest fully between activities  · Use the right equipment  for sports and exercise  Wear braces or tape around weak joints as directed  Follow up with your healthcare provider as directed:  Write down your questions so you remember to ask them during your visits  Contact your healthcare provider if:   · You have increased pain even after you take medicine  · You have questions or concerns about your condition or care  Return to the emergency department if:   · You have increased redness over the joint, or swelling in the joint  · You suddenly cannot move your joint  © 2017 [facility] Information is for End User's use only and may not be sold, redistributed or otherwise used for commercial purposes  All illustrations and images included in CareNotes® are the copyrighted property of [employer]  or [employer]  The above information is an  only  It is not intended as medical advice for individual conditions or treatments  Talk to your doctor, nurse or pharmacist before following any medical regimen to see if it is safe and effective for you  Shoulder Sprain   AMBULATORY CARE:   A shoulder sprain  happens when a ligament in your shoulder is stretched or torn  Ligaments are the tough tissues that connect bones  Ligaments allow you to lift, lower, and rotate your arm  A shoulder sprain is usually caused by a fall onto your outstretched arm  Common symptoms include the following:   · Bruising or changes in skin color    · Pain and stiffness, especially with movement    · Swelling and tenderness  Return care immediately if:   · You are short of breath      · Your throat feels tight, or you have trouble swallowing  · You feel sudden, sharp chest pain on the same side as your injury  · Your skin feels cold or clammy  Contact your healthcare provider if:   · The skin on your injured shoulder looks blue or pale  · You have new or increased swelling and pain in your shoulder  · You have new or increased stiffness when you move your injured shoulder  · You have questions or concerns about your condition or care  Treatment for a shoulder sprain  may include a support device, such as a brace, sling, or splint  These devices limit movement and protect your joint  Treatment may also include pain medicine, physical therapy, or surgery if the ligament does not heal  Ask how to take pain medicine safely  Self-care  · Rest  your shoulder for at least 48 hours  Avoid activities that cause pain  Return to normal activities as directed  · Apply ice  on your shoulder for 15 to 20 minutes every hour or as directed  Use an ice pack, or put crushed ice in a plastic bag  Cover it with a towel  Ice helps prevent tissue damage and decreases swelling and pain  · Compress your shoulder as directed  Compression provides support and helps decrease swelling and movement so your shoulder can heal  For mild sprains, you may be given a sling to support your arm  You may need a padded brace or a plaster cast to hold your shoulder in place if the sprain is more serious  How to wear a brace, sling, or splint:   · Wear your brace, sling, or splint as directed  You may need to wear it all the time and take it off only to bathe or do exercises  Ask your healthcare provider how long you should wear it  · Keep your skin clean and dry  Padding under your armpit will help absorb sweat and prevent sores on your skin  · Do not hunch your shoulders  This may cause pain  Keep your shoulders relaxed  · Position the sling over your arm and hand so that it also covers your knuckles    This will help the sling support your wrist and hand  Position your wrist higher than your elbow  Your wrist may start to hurt or go numb if your sling is too short  Exercise your shoulder:  After you rest your shoulder for 3 to 7 days, you will need to do light exercises to decrease shoulder stiffness  Check with your healthcare provider before you return to your normal activities or sports  Prevent another injury:  You can hurt your shoulder again if you stop treatment too soon  The following may decrease your risk for sprains:  · Do not exercise when you are tired or in pain  Warm up and stretch before you exercise  · Wear equipment to protect yourself when you play sports  · Wear shoes that fit well and run on flat surfaces to prevent falls  Follow up with your healthcare provider as directed:  Write down your questions so you remember to ask them during your visits  © 2017 [address] [employer] Information is for End User's use only and may not be sold, redistributed or otherwise used for commercial purposes  All illustrations and images included in CareNotes® are the copyrighted property of [employer]  or [employer]  The above information is an  only  It is not intended as medical advice for individual conditions or treatments  Talk to your doctor, nurse or pharmacist before following any medical regimen to see if it is safe and effective for you

## 2018-08-14 ENCOUNTER — APPOINTMENT (OUTPATIENT)
Dept: LAB | Facility: MEDICAL CENTER | Age: 43
End: 2018-08-14
Payer: COMMERCIAL

## 2018-08-14 ENCOUNTER — OFFICE VISIT (OUTPATIENT)
Dept: FAMILY MEDICINE CLINIC | Facility: CLINIC | Age: 43
End: 2018-08-14
Payer: COMMERCIAL

## 2018-08-14 VITALS
HEIGHT: 68 IN | DIASTOLIC BLOOD PRESSURE: 72 MMHG | BODY MASS INDEX: 41.07 KG/M2 | SYSTOLIC BLOOD PRESSURE: 130 MMHG | WEIGHT: 271 LBS

## 2018-08-14 DIAGNOSIS — E11.9 TYPE 2 DIABETES MELLITUS WITHOUT COMPLICATION, WITHOUT LONG-TERM CURRENT USE OF INSULIN (HCC): Primary | ICD-10-CM

## 2018-08-14 DIAGNOSIS — Z86.711 HISTORY OF PULMONARY EMBOLUS (PE): ICD-10-CM

## 2018-08-14 DIAGNOSIS — M79.661 RIGHT CALF PAIN: ICD-10-CM

## 2018-08-14 DIAGNOSIS — K92.0 HEMATEMESIS WITHOUT NAUSEA: ICD-10-CM

## 2018-08-14 DIAGNOSIS — J45.20 MILD INTERMITTENT ASTHMA WITHOUT COMPLICATION: ICD-10-CM

## 2018-08-14 PROBLEM — J45.909 ASTHMA: Status: ACTIVE | Noted: 2018-08-14

## 2018-08-14 PROBLEM — F31.9 BIPOLAR 1 DISORDER (HCC): Status: ACTIVE | Noted: 2018-08-14

## 2018-08-14 PROBLEM — I26.99 PULMONARY EMBOLI (HCC): Status: ACTIVE | Noted: 2018-08-14

## 2018-08-14 LAB
ALBUMIN SERPL BCP-MCNC: 3.7 G/DL (ref 3.5–5)
ALP SERPL-CCNC: 47 U/L (ref 46–116)
ALT SERPL W P-5'-P-CCNC: 36 U/L (ref 12–78)
ANION GAP SERPL CALCULATED.3IONS-SCNC: 8 MMOL/L (ref 4–13)
AST SERPL W P-5'-P-CCNC: 18 U/L (ref 5–45)
BASOPHILS # BLD AUTO: 0.04 THOUSANDS/ΜL (ref 0–0.1)
BASOPHILS NFR BLD AUTO: 0 % (ref 0–1)
BILIRUB SERPL-MCNC: 0.64 MG/DL (ref 0.2–1)
BUN SERPL-MCNC: 18 MG/DL (ref 5–25)
CALCIUM SERPL-MCNC: 9.1 MG/DL (ref 8.3–10.1)
CHLORIDE SERPL-SCNC: 103 MMOL/L (ref 100–108)
CHOLEST SERPL-MCNC: 140 MG/DL (ref 50–200)
CO2 SERPL-SCNC: 26 MMOL/L (ref 21–32)
CREAT SERPL-MCNC: 0.86 MG/DL (ref 0.6–1.3)
CREAT UR-MCNC: 314 MG/DL
DEPRECATED D DIMER PPP: <220 NG/ML (FEU) (ref 0–424)
EOSINOPHIL # BLD AUTO: 0.17 THOUSAND/ΜL (ref 0–0.61)
EOSINOPHIL NFR BLD AUTO: 2 % (ref 0–6)
ERYTHROCYTE [DISTWIDTH] IN BLOOD BY AUTOMATED COUNT: 14.4 % (ref 11.6–15.1)
EST. AVERAGE GLUCOSE BLD GHB EST-MCNC: 148 MG/DL
GFR SERPL CREATININE-BSD FRML MDRD: 107 ML/MIN/1.73SQ M
GLUCOSE P FAST SERPL-MCNC: 137 MG/DL (ref 65–99)
HBA1C MFR BLD: 6.8 % (ref 4.2–6.3)
HCT VFR BLD AUTO: 44.7 % (ref 36.5–49.3)
HDLC SERPL-MCNC: 37 MG/DL (ref 40–60)
HGB BLD-MCNC: 15.1 G/DL (ref 12–17)
IMM GRANULOCYTES # BLD AUTO: 0.04 THOUSAND/UL (ref 0–0.2)
IMM GRANULOCYTES NFR BLD AUTO: 0 % (ref 0–2)
LDLC SERPL CALC-MCNC: 72 MG/DL (ref 0–100)
LYMPHOCYTES # BLD AUTO: 3.14 THOUSANDS/ΜL (ref 0.6–4.47)
LYMPHOCYTES NFR BLD AUTO: 33 % (ref 14–44)
MCH RBC QN AUTO: 29.1 PG (ref 26.8–34.3)
MCHC RBC AUTO-ENTMCNC: 33.8 G/DL (ref 31.4–37.4)
MCV RBC AUTO: 86 FL (ref 82–98)
MICROALBUMIN UR-MCNC: 39.6 MG/L (ref 0–20)
MICROALBUMIN/CREAT 24H UR: 13 MG/G CREATININE (ref 0–30)
MONOCYTES # BLD AUTO: 0.46 THOUSAND/ΜL (ref 0.17–1.22)
MONOCYTES NFR BLD AUTO: 5 % (ref 4–12)
NEUTROPHILS # BLD AUTO: 5.82 THOUSANDS/ΜL (ref 1.85–7.62)
NEUTS SEG NFR BLD AUTO: 60 % (ref 43–75)
NRBC BLD AUTO-RTO: 0 /100 WBCS
PLATELET # BLD AUTO: 273 THOUSANDS/UL (ref 149–390)
PMV BLD AUTO: 9.3 FL (ref 8.9–12.7)
POTASSIUM SERPL-SCNC: 4.1 MMOL/L (ref 3.5–5.3)
PROT SERPL-MCNC: 8 G/DL (ref 6.4–8.2)
RBC # BLD AUTO: 5.19 MILLION/UL (ref 3.88–5.62)
SODIUM SERPL-SCNC: 137 MMOL/L (ref 136–145)
TRIGL SERPL-MCNC: 154 MG/DL
TSH SERPL DL<=0.05 MIU/L-ACNC: 1.26 UIU/ML (ref 0.36–3.74)
WBC # BLD AUTO: 9.67 THOUSAND/UL (ref 4.31–10.16)

## 2018-08-14 PROCEDURE — 82570 ASSAY OF URINE CREATININE: CPT | Performed by: FAMILY MEDICINE

## 2018-08-14 PROCEDURE — 99214 OFFICE O/P EST MOD 30 MIN: CPT | Performed by: FAMILY MEDICINE

## 2018-08-14 PROCEDURE — 84443 ASSAY THYROID STIM HORMONE: CPT | Performed by: FAMILY MEDICINE

## 2018-08-14 PROCEDURE — 83036 HEMOGLOBIN GLYCOSYLATED A1C: CPT | Performed by: FAMILY MEDICINE

## 2018-08-14 PROCEDURE — 3061F NEG MICROALBUMINURIA REV: CPT | Performed by: FAMILY MEDICINE

## 2018-08-14 PROCEDURE — 80061 LIPID PANEL: CPT | Performed by: FAMILY MEDICINE

## 2018-08-14 PROCEDURE — 82043 UR ALBUMIN QUANTITATIVE: CPT | Performed by: FAMILY MEDICINE

## 2018-08-14 PROCEDURE — 36415 COLL VENOUS BLD VENIPUNCTURE: CPT | Performed by: FAMILY MEDICINE

## 2018-08-14 PROCEDURE — 85379 FIBRIN DEGRADATION QUANT: CPT | Performed by: FAMILY MEDICINE

## 2018-08-14 PROCEDURE — 85025 COMPLETE CBC W/AUTO DIFF WBC: CPT | Performed by: FAMILY MEDICINE

## 2018-08-14 PROCEDURE — 80053 COMPREHEN METABOLIC PANEL: CPT | Performed by: FAMILY MEDICINE

## 2018-08-14 NOTE — PROGRESS NOTES
Assessment/Plan: For his diabetes, we will get blood work on him  For his continuing spitting up of blood, we will refer to GI  For his right calf pain, we will get a venous Doppler and a D-dimer on him  We will call with the results  We will see him back in 1 month or p r n  No problem-specific Assessment & Plan notes found for this encounter  Diagnoses and all orders for this visit:    Type 2 diabetes mellitus without complication, without long-term current use of insulin (HCC)  -     Comprehensive metabolic panel  -     Hemoglobin A1C  -     Microalbumin / creatinine urine ratio  -     Lipid Panel with Direct LDL reflex  -     TSH, 3rd generation with Free T4 reflex  -     Ambulatory referral to Podiatry; Future    History of pulmonary embolus (PE)    Mild intermittent asthma without complication    Hematemesis without nausea  -     CBC and differential  -     Ambulatory referral to Gastroenterology; Future    Right calf pain  -     VAS lower limb venous duplex study, unilateral/limited; Future  -     D-dimer, quantitative          Subjective:      Patient ID: Colt Cook is a 43 y o  male  Patient here today for follow-up  Patient denies any chest pain or shortness of breath  Patient is still not taking his Xarelto  Patient continues to spit up blood  Patient never did go for his EGD  Patient states he has been taking his other medications, but questioning him, he does not always take his metformin  He states he has been trying to watch his diet  Patient states over the past week or so  Has had increased right calf pain  Denies any trauma  Diabetes   He presents for his follow-up diabetic visit  He has type 2 diabetes mellitus  His disease course has been stable  There are no hypoglycemic associated symptoms  There are no diabetic associated symptoms  There are no hypoglycemic complications  Symptoms are stable  There are no diabetic complications   Risk factors for coronary artery disease include diabetes mellitus, dyslipidemia, hypertension and male sex  Current diabetic treatment includes oral agent (dual therapy)  He is compliant with treatment some of the time  His weight is stable  He is following a generally unhealthy diet  When asked about meal planning, he reported none  He rarely participates in exercise  There is no change in his home blood glucose trend  An ACE inhibitor/angiotensin II receptor blocker is being taken  He does not see a podiatrist Eye exam is current  The following portions of the patient's history were reviewed and updated as appropriate:   He  has a past medical history of Allergic rhinitis; Asthma; Bipolar 1 disorder ([facility] [address] ); Chronic kidney disease; Diabetes mellitus ([facility] [address] ); Hematuria; Hyperlipidemia; Leukocytosis; Diana-Hussein tear; Pulmonary emboli ([facility] [address] ); Seizures ([facility] [address] ); and Sleep apnea  He   Patient Active Problem List    Diagnosis Date Noted    History of pulmonary embolus (PE) 08/14/2018    Bipolar 1 disorder ([facility] [address] ) 08/14/2018    Mild intermittent asthma without complication 35/[birth year]    Hematemesis without nausea 08/14/2018    Right calf pain 08/14/2018    Type 2 diabetes mellitus without complication, without long-term current use of insulin ([facility] [address] ) 05/09/2012     He  has a past surgical history that includes Nasal septum surgery; Angioplasty; and Cardiac surgery  His family history includes Asthma in his father; Cancer in his mother and paternal grandfather; Colon cancer in his family; Diabetes in his mother; Hypertension in his mother  He  reports that he has never smoked  His smokeless tobacco use includes Chew  He reports that he does not drink alcohol or use drugs    Current Outpatient Prescriptions   Medication Sig Dispense Refill    albuterol (PROVENTIL HFA,VENTOLIN HFA) 90 mcg/act inhaler Inhale 2 puffs every 4 (four) hours as needed for wheezing 1 Inhaler 0    aspirin 81 mg chewable tablet Chew 81 mg daily      atorvastatin (LIPITOR) 10 mg tablet Take 10 mg by mouth daily   divalproex sodium (DEPAKOTE) 500 mg EC tablet Take 500 mg by mouth 2 (two) times a day   JANUVIA 100 MG tablet take 1 tablet by mouth once daily 30 tablet 5    lisinopril (ZESTRIL) 10 mg tablet Take 10 mg by mouth daily   metFORMIN (GLUCOPHAGE) 1000 MG tablet take 1 tablet by mouth twice a day 60 tablet 5    naproxen (NAPROSYN) 500 mg tablet Take 1 tablet (500 mg total) by mouth 3 (three) times a day with meals 30 tablet 0     No current facility-administered medications for this visit  Current Outpatient Prescriptions on File Prior to Visit   Medication Sig    albuterol (PROVENTIL HFA,VENTOLIN HFA) 90 mcg/act inhaler Inhale 2 puffs every 4 (four) hours as needed for wheezing    aspirin 81 mg chewable tablet Chew 81 mg daily    atorvastatin (LIPITOR) 10 mg tablet Take 10 mg by mouth daily   divalproex sodium (DEPAKOTE) 500 mg EC tablet Take 500 mg by mouth 2 (two) times a day   JANUVIA 100 MG tablet take 1 tablet by mouth once daily    lisinopril (ZESTRIL) 10 mg tablet Take 10 mg by mouth daily   metFORMIN (GLUCOPHAGE) 1000 MG tablet take 1 tablet by mouth twice a day    naproxen (NAPROSYN) 500 mg tablet Take 1 tablet (500 mg total) by mouth 3 (three) times a day with meals     No current facility-administered medications on file prior to visit  He is allergic to epinephrine; iodine; other; and shellfish-derived products       Review of Systems   Constitutional: Negative  Respiratory: Negative  Cardiovascular: Negative  Gastrointestinal:        As per HPI   Endocrine:        As per HPI   Genitourinary: Negative  Objective:      /72   Ht 5' 8" (1 727 m)   Wt 123 kg (271 lb)   BMI 41 21 kg/m²          Physical Exam   Constitutional: He is oriented to person, place, and time  He appears well-developed and well-nourished  No distress  Cardiovascular: Normal rate, regular rhythm and normal heart sounds  Exam reveals no gallop and no friction rub  No murmur heard  Pulmonary/Chest: Effort normal and breath sounds normal  No respiratory distress  He has no wheezes  He has no rales  Musculoskeletal: He exhibits edema ( swelling of the right calf) and tenderness ( right calf tenderness)  Neurological: He is alert and oriented to person, place, and time  Skin: He is not diaphoretic  Psychiatric: He has a normal mood and affect  His behavior is normal  Judgment and thought content normal    Vitals reviewed

## 2018-08-17 ENCOUNTER — APPOINTMENT (OUTPATIENT)
Dept: RADIOLOGY | Facility: MEDICAL CENTER | Age: 43
End: 2018-08-17
Payer: COMMERCIAL

## 2018-08-17 ENCOUNTER — OFFICE VISIT (OUTPATIENT)
Dept: OBGYN CLINIC | Facility: CLINIC | Age: 43
End: 2018-08-17
Payer: COMMERCIAL

## 2018-08-17 VITALS
HEIGHT: 68 IN | DIASTOLIC BLOOD PRESSURE: 85 MMHG | WEIGHT: 268 LBS | BODY MASS INDEX: 40.62 KG/M2 | SYSTOLIC BLOOD PRESSURE: 134 MMHG | HEART RATE: 70 BPM

## 2018-08-17 DIAGNOSIS — M25.512 LEFT SHOULDER PAIN, UNSPECIFIED CHRONICITY: ICD-10-CM

## 2018-08-17 DIAGNOSIS — M75.42 IMPINGEMENT SYNDROME OF LEFT SHOULDER: Primary | ICD-10-CM

## 2018-08-17 PROCEDURE — 99202 OFFICE O/P NEW SF 15 MIN: CPT | Performed by: PHYSICIAN ASSISTANT

## 2018-08-17 PROCEDURE — 20610 DRAIN/INJ JOINT/BURSA W/O US: CPT | Performed by: PHYSICIAN ASSISTANT

## 2018-08-17 PROCEDURE — 72040 X-RAY EXAM NECK SPINE 2-3 VW: CPT

## 2018-08-17 RX ORDER — BETAMETHASONE SODIUM PHOSPHATE AND BETAMETHASONE ACETATE 3; 3 MG/ML; MG/ML
6 INJECTION, SUSPENSION INTRA-ARTICULAR; INTRALESIONAL; INTRAMUSCULAR; SOFT TISSUE
Status: COMPLETED | OUTPATIENT
Start: 2018-08-17 | End: 2018-08-17

## 2018-08-17 RX ORDER — LIDOCAINE HYDROCHLORIDE 10 MG/ML
2 INJECTION, SOLUTION INFILTRATION; PERINEURAL
Status: COMPLETED | OUTPATIENT
Start: 2018-08-17 | End: 2018-08-17

## 2018-08-17 RX ADMIN — BETAMETHASONE SODIUM PHOSPHATE AND BETAMETHASONE ACETATE 6 MG: 3; 3 INJECTION, SUSPENSION INTRA-ARTICULAR; INTRALESIONAL; INTRAMUSCULAR; SOFT TISSUE at 10:01

## 2018-08-17 RX ADMIN — LIDOCAINE HYDROCHLORIDE 2 ML: 10 INJECTION, SOLUTION INFILTRATION; PERINEURAL at 10:01

## 2018-08-17 NOTE — PATIENT INSTRUCTIONS
Patient may ice his left shoulder 20 min 2 times a day and then as needed  Start physical therapy  Recheck in 6 weeks to note his progress  If not improved, would consider MRI evaluation  Activities as tolerated in pain-free range

## 2018-08-17 NOTE — PROGRESS NOTES
Assessment:    1  Impingement syndrome of left shoulder    2  Left shoulder pain, unspecified chronicity      Plan: Patient may ice his left shoulder 20 min 2 times a day and then as needed  Start physical therapy  Recheck in 6 weeks to note his progress  If not improved, would consider MRI evaluation  Activities as tolerated in pain-free range  Chief Complaint:  Left shoulder pain and burning sensation left axilla    RACHEL Fields is a 43 y o  male with diabetes mellitus who is overweight and complains of left shoulder pain  Was seen emergency room with x-rays taken which were read as negative  He is told he might have tendinitis or bursitis  He is referred to the office for evaluation  He notes he gets a burning sensation the posterior aspect of his left shoulder and axilla at times  He notes he has trouble moving his neck to turn while he was driving  He has not had any physical therapy  He also reports that sometimes his hands go numb      Past Medical History:   Diagnosis Date    Allergic rhinitis     resolved 6/20/17    Asthma     resolved 6/20/17    Bipolar 1 disorder ([facility] [address] )     resolved 6/20/17    Chronic kidney disease     Diabetes mellitus ([facility] [address] )     Hematuria     resolved 6/20/17    Hyperlipidemia     Leukocytosis     resolved 6/20/17    Diana-Hussein tear     Pulmonary emboli ([facility] [address] )     resolved 6/20/17    Seizures ([facility] [address] )     Sleep apnea     resolved 6/20/17     Past Surgical History:   Procedure Laterality Date    ANGIOPLASTY      previous stent placement    CARDIAC SURGERY      NASAL SEPTUM SURGERY        Allergies   Allergen Reactions    Epinephrine Other (See Comments)     "pass out"    Iodine Other (See Comments)     shellfish    Other Other (See Comments)     NAUSEA VOMITING TO SHELLFISH    Shellfish-Derived Products GI Intolerance       Current Outpatient Prescriptions:     albuterol (PROVENTIL HFA,VENTOLIN HFA) 90 mcg/act inhaler, Inhale 2 puffs every 4 (four) hours as needed for wheezing, Disp: 1 Inhaler, Rfl: 0    aspirin 81 mg chewable tablet, Chew 81 mg daily, Disp: , Rfl:     atorvastatin (LIPITOR) 10 mg tablet, Take 10 mg by mouth daily  , Disp: , Rfl:     divalproex sodium (DEPAKOTE) 500 mg EC tablet, Take 500 mg by mouth 2 (two) times a day , Disp: , Rfl:     JANUVIA 100 MG tablet, take 1 tablet by mouth once daily, Disp: 30 tablet, Rfl: 5    lisinopril (ZESTRIL) 10 mg tablet, Take 10 mg by mouth daily  , Disp: , Rfl:     metFORMIN (GLUCOPHAGE) 1000 MG tablet, take 1 tablet by mouth twice a day, Disp: 60 tablet, Rfl: 5    naproxen (NAPROSYN) 500 mg tablet, Take 1 tablet (500 mg total) by mouth 3 (three) times a day with meals, Disp: 30 tablet, Rfl: 0    Social History     Occupational History    Not on file  Social History Main Topics    Smoking status: Never Smoker    Smokeless tobacco: Current User     Types: Chew    Alcohol use No      Comment: occasional as per [employer]    Drug use: No    Sexual activity: Not on file     Review of Systems   Constitutional: Negative  HENT: Negative  Eyes: Negative  Respiratory: Negative  Cardiovascular: Negative  Gastrointestinal: Negative  Endocrine: Negative  Genitourinary: Negative  Musculoskeletal: Positive for arthralgias and neck pain  As per HPI  Skin: Negative  Allergic/Immunologic: Negative  Neurological: Positive for numbness  Hematological: Negative  Psychiatric/Behavioral: Negative  Objective:  Blood pressure 134/85, pulse 70, height 5' 8" (1 727 m), weight 122 kg (268 lb)  Body mass index is 40 75 kg/m²  Physical Exam   Constitutional: Patient is oriented to person, place, and time  Patient appears well-developed and well-nourished  No distress, overweight  HENT:   Head: Normocephalic  Eyes: Conjunctivae are normal  Right eye exhibits no discharge  Left eye exhibits no discharge  No scleral icterus  Cardiovascular: Normal rate  Pulmonary/Chest: Effort normal    Neurological: Patient is alert and oriented to person, place, and time  Skin: Skin is warm and dry  No rash noted  Patient is not diaphoretic  No erythema  No pallor  multiple tattoos  Psychiatric: Patient has a normal mood and affect  Patient's behavior is normal  Judgment and thought content normal      Ortho Exam     Cervical spine slightly limited range of motion in all planes  Notes posterior neck discomfort with extension  Negative Spurling  Left shoulder no obvious cutaneous lesions  No effusion erythema or warmth  Tenderness over the lateral acromial area and the rotator cuff distribution  Mild AC joint tenderness  No weakness with supraspinatus empty can testing or with resisted external rotation with the elbow at the side  He does have some mild discomfort with that  Gross sensation is intact throughout the bilateral upper extremities and deep tendon reflexes are grossly symmetric and 2/4  I have personally reviewed pertinent films in PACS and my interpretation is   X-rays left shoulder grossly within normal limits without obvious calcific tendinitis or bony lesions  x-rays cervical spine the loss of lordotic curvature otherwise grossly normal alignment      Large joint arthrocentesis  Date/Time: 8/17/2018 10:01 AM  Consent given by: patient  Timeout: Immediately prior to procedure a time out was called to verify the correct patient, procedure, equipment, support staff and site/side marked as required   Supporting Documentation  Indications: pain   Procedure Details  Location: shoulder - L subacromial bursa  Preparation: Patient was prepped and draped in the usual sterile fashion  Needle size: 22 G  Ultrasound guidance: no  Approach: posterior  Medications administered: 6 mg betamethasone acetate-betamethasone sodium phosphate 6 (3-3) mg/mL; 2 mL lidocaine 1 %    Patient tolerance: patient tolerated the procedure well with no immediate complications  Dressing:  Sterile dressing applied      Betito Oakley PA-C  Portions of the record may have been created with voice recognition software   Occasional wrong word or "sound a like" substitutions may have occurred due to the inherent limitations of voice recognition software

## 2018-08-22 ENCOUNTER — EVALUATION (OUTPATIENT)
Dept: PHYSICAL THERAPY | Facility: CLINIC | Age: 43
End: 2018-08-22
Payer: COMMERCIAL

## 2018-08-22 DIAGNOSIS — M75.42 IMPINGEMENT SYNDROME OF LEFT SHOULDER: Primary | ICD-10-CM

## 2018-08-22 PROCEDURE — 97010 HOT OR COLD PACKS THERAPY: CPT | Performed by: PHYSICAL THERAPIST

## 2018-08-22 PROCEDURE — 97110 THERAPEUTIC EXERCISES: CPT | Performed by: PHYSICAL THERAPIST

## 2018-08-22 PROCEDURE — G8990 OTHER PT/OT CURRENT STATUS: HCPCS | Performed by: PHYSICAL THERAPIST

## 2018-08-22 PROCEDURE — 97162 PT EVAL MOD COMPLEX 30 MIN: CPT | Performed by: PHYSICAL THERAPIST

## 2018-08-22 PROCEDURE — G8991 OTHER PT/OT GOAL STATUS: HCPCS | Performed by: PHYSICAL THERAPIST

## 2018-08-22 NOTE — PROGRESS NOTES
PT Evaluation  and PT Discharge    Today's date: 2018  Patient name: Elena Nevarez  : 1975  MRN: [id number]  Referring provider: Nikole Ortiz PA-C  Dx:   Encounter Diagnosis     ICD-10-CM    1  Impingement syndrome of left shoulder M75 42 Ambulatory referral to Physical Therapy     Addendum 2018: The patient did not attend 2 scheduled appointments since the initial evaluation  He therefore has been discharged from our care  Assessment    Assessment details:   CURRENT FUNCTIONAL STATUS    Sleep tolerance: Unable to lie on affected side  Dressing tolerance: Fair with left arm  Bathing/washing hair tolerance: Fair with left arm  Able to reach overhead with moderate pain  Avoiding lifting with the left shoulder  SHORT TERM GOALS (2 WEEKS)    Increase shoulder AROM by 10 degrees  Increase shoulder strength by 3-5 lbs in all weak areas  Decrease pain to 1-4/10  Sleep tolerance: Able to lie on affected side for 15 minutes  Dressing tolerance: Fair/Good with left arm  Bathing/washing hair tolerance: Fair/Good with left arm  Able to reach overhead with minimal pain  Able to lift 3 lbs to shoulder level  LONG TERM GOALS (DISCHARGE)    Shoulder AROM: F=165 deg, ABD=170 deg, ER=65 deg, IR BTB=T-L JCT  Shoulder Strength: F=75 lbs, ABD=76 lbs, ER=45 lbs, IR=65 lbs  Decrease pain to 0-2/10  Sleep tolerance: Able to lie on affected side for 30 minutes  Dressing tolerance: Good with left arm  Bathing/washing hair tolerance: Good with left arm  Able to reach overhead without pain  Able to lift 10 lbs to shoulder level, 5 lbs overhead  Understanding of Dx/Px/POC: good   Prognosis: good    Goals  See assessment details above        Plan  Planned modality interventions: cryotherapy and unattended electrical stimulation  Planned therapy interventions: therapeutic training, therapeutic exercise and neuromuscular re-education  Frequency: 2x week  Duration in weeks: 4  Plan details: Chi Lange is a 43y o  year old male presenting to PT with pain, decreased range of motion, decreased strength, and decreased tolerance to activity  This patient would benefit from skilled PT services to address these issues and to maximize function  Thank you for the referral           Subjective Evaluation    History of Present Illness  Mechanism of injury: CC: Left shoulder pain and clicking, left hand numbness  Denies having any weakness, stiffness, or subluxation in the shoulder  HPI: The patient's left shoulder problem began when he slipped on ice and fell this past December  He did not seek treatment until recently  X-rays were normal  An injection received on 2018 afforded some relief  He is on disability at this time  Pain  Current pain rating: 3  At best pain ratin  At worst pain ratin    Hand dominance: right    Patient Goals  Patient goals for therapy: decreased pain and return to sport/leisure activities          Objective     General Comments     Shoulder Comments   CURRENT OBJECTIVE MEASUREMENTS    Shoulder AROM: F=155 deg, ABD=155 deg, ER=65 deg, IR BTB=T-L JCT  Shoulder Strength: F=68 lbs, ABD=72 lbs, ER=38 lbs, IR=64 lbs                   Precautions: None    Daily Treatment Diary     Manual          PROM                  Exercise Diary          UBE: S 3 2/2        T-Band IR         T-Band ER         ER sitting at 90 deg ABD 5# 3/10        Forward/Lateral Raises         Hoist Row: S 5 P 6 3/10        Lat Pulldown: S         XO Biceps P 6 3/10        XO Triceps P 5 3/10        XO Upright Row         XO IR         XO ER         XO SA Pulldown         [facility]                  Modalities          CP 15'

## 2018-08-28 ENCOUNTER — APPOINTMENT (OUTPATIENT)
Dept: PHYSICAL THERAPY | Facility: CLINIC | Age: 43
End: 2018-08-28
Payer: COMMERCIAL

## 2018-09-13 ENCOUNTER — TRANSITIONAL CARE MANAGEMENT (OUTPATIENT)
Dept: FAMILY MEDICINE CLINIC | Facility: CLINIC | Age: 43
End: 2018-09-13

## 2018-09-17 ENCOUNTER — OFFICE VISIT (OUTPATIENT)
Dept: FAMILY MEDICINE CLINIC | Facility: CLINIC | Age: 43
End: 2018-09-17
Payer: COMMERCIAL

## 2018-09-17 VITALS
HEIGHT: 68 IN | WEIGHT: 269 LBS | SYSTOLIC BLOOD PRESSURE: 114 MMHG | DIASTOLIC BLOOD PRESSURE: 76 MMHG | BODY MASS INDEX: 40.77 KG/M2

## 2018-09-17 DIAGNOSIS — E11.9 TYPE 2 DIABETES MELLITUS WITHOUT COMPLICATION, WITHOUT LONG-TERM CURRENT USE OF INSULIN (HCC): ICD-10-CM

## 2018-09-17 DIAGNOSIS — F31.9 BIPOLAR 1 DISORDER (HCC): Primary | ICD-10-CM

## 2018-09-17 PROCEDURE — 99495 TRANSJ CARE MGMT MOD F2F 14D: CPT | Performed by: FAMILY MEDICINE

## 2018-09-17 PROCEDURE — 4010F ACE/ARB THERAPY RXD/TAKEN: CPT | Performed by: FAMILY MEDICINE

## 2018-09-17 RX ORDER — LISINOPRIL 10 MG/1
10 TABLET ORAL DAILY
Qty: 30 TABLET | Refills: 5 | Status: SHIPPED | OUTPATIENT
Start: 2018-09-17 | End: 2018-09-17 | Stop reason: SDUPTHER

## 2018-09-17 RX ORDER — ATORVASTATIN CALCIUM 10 MG/1
TABLET, FILM COATED ORAL
Qty: 30 TABLET | Refills: 5 | Status: SHIPPED | OUTPATIENT
Start: 2018-09-17 | End: 2019-04-09 | Stop reason: SDUPTHER

## 2018-09-17 RX ORDER — RISPERIDONE 2 MG/1
2 TABLET, FILM COATED ORAL 2 TIMES DAILY
Qty: 30 TABLET | Refills: 0
Start: 2018-09-17

## 2018-09-17 RX ORDER — LISINOPRIL 10 MG/1
TABLET ORAL
Qty: 30 TABLET | Refills: 5 | Status: SHIPPED | OUTPATIENT
Start: 2018-09-17 | End: 2019-09-12 | Stop reason: SDUPTHER

## 2018-09-17 NOTE — PROGRESS NOTES
Transition of Care  Follow-up After Hospitalization    Alexandra Geronimo 43 y o  male   Date:  9/17/2018    Date and time hospital follow up call was made:  9/13/2018  8:16 AM  Patient [facility]  Date of admission:  9/8/18  Date of discharge:  9/12/18  Diagnosis:  BIPOLAR DISORDER  Disposition:  Home  Were the patients medicaitons reviewed and updated:  No  Current symptoms:  None  Post hospital issues:  None  Should patient be enrolled in anticoag monitoring?:  No  Scheduled for follow up?:  Yes (Comment: ELVIRA 9/18/18)  Did you obtain your prescribed medications:  Yes  Do you need help managing your perscriptions or medications:  No  Is transportation to your appointments needed:  No  I have advised the patient to call PCP with any new or worsening symptoms (please type in name along with any credentials):  Val Trevino  Living Arrangements:  Spouse or Significiant other  Support System:  Spouse  The type of support provided:  Emotional  Do you have social support:  Yes, some  Are you recieving outpatient services:  No  Are you recieving home care services:  No  Are you using any community resources:  No  Current waiver service:  No  Have you fallen in the last 12 months:  No  Interperter language line required?:  No  Counseling:  Patient       Hospital records were reviewed  Medications upon discharge reviewed/updated  Discharge Disposition:    Follow up visits with other specialists:       Assessment and Plan:    Sulema Mcneil was seen today for transition of care management  Diagnoses and all orders for this visit:    Bipolar 1 disorder ([facility] )  -     risperiDONE (RisperDAL) 2 mg tablet; Take 1 tablet (2 mg total) by mouth 2 (two) times a day    Type 2 diabetes mellitus without complication, without long-term current use of insulin ([facility] )  -     Ambulatory referral to Ophthalmology; Future  -     lisinopril (ZESTRIL) 10 mg tablet;  Take 1 tablet (10 mg total) by mouth daily     I put in a referral for Ophthalmology for his diabetes  He will follow up with Psychiatry as scheduled  Follow-up here in 2-3 months or p r n  HPI:  Patient here today for TO C  Patient was admitted to the psychiatric rosas due to exacerbation of his bipolar disease  Patient is now on Risperdal 2 mg daily along with his topical   He is feeling better  He denies any chest pain or shortness of breath  He is scheduled to see the psychiatrist in the next couple weeks  ROS: Review of Systems   Constitutional: Negative  Respiratory: Negative  Cardiovascular: Negative  Gastrointestinal: Negative  Genitourinary: Negative  Psychiatric/Behavioral: Negative for suicidal ideas         Past Medical History:   Diagnosis Date    Allergic rhinitis     resolved 6/20/17    Asthma     resolved 6/20/17    Bipolar 1 disorder [facility] [address] )     resolved 6/20/17    Chronic kidney disease     Diabetes mellitus ([facility] [address] )     Hematuria     resolved 6/20/17    Hyperlipidemia     Leukocytosis     resolved 6/20/17    Diana-Hussein tear     Pulmonary emboli ([facility] [address] )     resolved 6/20/17    Seizures ([facility] [address] )     Sleep apnea     resolved 6/20/17       Past Surgical History:   Procedure Laterality Date    ANGIOPLASTY      previous stent placement    CARDIAC SURGERY      NASAL SEPTUM SURGERY         Social History     Social History    Marital status: /Civil Union     Spouse name: N/A    Number of children: N/A    Years of education: N/A     Social History Main Topics    Smoking status: Never Smoker    Smokeless tobacco: Current User     Types: Chew    Alcohol use No      Comment: occasional as per Allscripts    Drug use: No    Sexual activity: Not Asked     Other Topics Concern    None     Social History Narrative    None       Family History   Problem Relation Age of Onset    Cancer Mother     Diabetes Mother     Hypertension Mother     Asthma Father     Cancer Paternal Satira Petra Colon cancer Family        Allergies   Allergen Reactions    Epinephrine Other (See Comments)     "pass out"    Iodine Other (See Comments)     shellfish    Other Other (See Comments)     NAUSEA VOMITING TO SHELLFISH    Shellfish-Derived Products GI Intolerance         Current Outpatient Prescriptions:     albuterol (PROVENTIL HFA,VENTOLIN HFA) 90 mcg/act inhaler, Inhale 2 puffs every 4 (four) hours as needed for wheezing, Disp: 1 Inhaler, Rfl: 0    aspirin 81 mg chewable tablet, Chew 81 mg daily, Disp: , Rfl:     atorvastatin (LIPITOR) 10 mg tablet, Take 10 mg by mouth daily  , Disp: , Rfl:     divalproex sodium (DEPAKOTE) 500 mg EC tablet, Take 500 mg by mouth 2 (two) times a day , Disp: , Rfl:     JANUVIA 100 MG tablet, take 1 tablet by mouth once daily, Disp: 30 tablet, Rfl: 5    lisinopril (ZESTRIL) 10 mg tablet, Take 1 tablet (10 mg total) by mouth daily, Disp: 30 tablet, Rfl: 5    metFORMIN (GLUCOPHAGE) 1000 MG tablet, take 1 tablet by mouth twice a day, Disp: 60 tablet, Rfl: 5    naproxen (NAPROSYN) 500 mg tablet, Take 1 tablet (500 mg total) by mouth 3 (three) times a day with meals, Disp: 30 tablet, Rfl: 0    risperiDONE (RisperDAL) 2 mg tablet, Take 1 tablet (2 mg total) by mouth 2 (two) times a day, Disp: 30 tablet, Rfl: 0      Physical Exam:  /76   Ht 5' 8" (1 727 m)   Wt 122 kg (269 lb)   BMI 40 90 kg/m²     Physical Exam   Constitutional: He is oriented to person, place, and time  He appears well-developed and well-nourished  No distress  Cardiovascular: Normal rate, regular rhythm and normal heart sounds  Exam reveals no gallop and no friction rub  Pulses are no weak pulses  No murmur heard  Pulses:       Dorsalis pedis pulses are 2+ on the right side, and 2+ on the left side  Posterior tibial pulses are 2+ on the right side, and 2+ on the left side  Pulmonary/Chest: Effort normal and breath sounds normal  No respiratory distress  He has no wheezes   He has no rales    Musculoskeletal: He exhibits no edema  Feet:   Right Foot:   Skin Integrity: Positive for dry skin  Negative for ulcer, skin breakdown, erythema, warmth or callus  Left Foot:   Skin Integrity: Positive for dry skin  Negative for ulcer, skin breakdown, erythema, warmth or callus  Neurological: He is alert and oriented to person, place, and time  Skin: He is not diaphoretic  Psychiatric: He has a normal mood and affect  His behavior is normal  Judgment and thought content normal    Vitals reviewed  Patient's shoes and socks removed  Right Foot/Ankle   Right Foot Inspection  Skin Exam: dry skin skin not intact, no warmth, no callus, no erythema, no maceration, no abnormal color, no pre-ulcer, no ulcer and no callus                            Sensory       Monofilament testing: diminished  Vascular    The right DP pulse is 2+  The right PT pulse is 2+  Left Foot/Ankle  Left Foot Inspection  Skin Exam: dry skinskin not intact, no warmth, no erythema, no maceration, normal color, no pre-ulcer, no ulcer and no callus                                         Sensory       Monofilament: diminished  Vascular    The left DP pulse is 2+  The left PT pulse is 2+  Assign Risk Category:  No deformity present; No loss of protective sensation;  No weak pulses       Risk: 0        Labs:  Lab Results   Component Value Date    WBC 9 67 08/14/2018    HGB 15 1 08/14/2018    HCT 44 7 08/14/2018    MCV 86 08/14/2018     08/14/2018     Lab Results   Component Value Date     08/14/2018    K 4 1 08/14/2018     08/14/2018    CO2 26 08/14/2018    ANIONGAP 11 11/13/2015    BUN 18 08/14/2018    CREATININE 0 86 08/14/2018    GLUCOSE 395 (H) 11/13/2015    GLUF 137 (H) 08/14/2018    CALCIUM 9 1 08/14/2018    AST 18 08/14/2018    ALT 36 08/14/2018    ALKPHOS 47 08/14/2018    PROT 7 4 11/13/2015    BILITOT 0 30 11/13/2015    EGFR 107 08/14/2018

## 2018-10-17 DIAGNOSIS — E11.9 TYPE 2 DIABETES MELLITUS WITHOUT COMPLICATION, WITHOUT LONG-TERM CURRENT USE OF INSULIN (HCC): ICD-10-CM

## 2018-11-14 ENCOUNTER — TELEPHONE (OUTPATIENT)
Dept: FAMILY MEDICINE CLINIC | Facility: CLINIC | Age: 43
End: 2018-11-14

## 2019-01-11 DIAGNOSIS — E11.8 TYPE 2 DIABETES MELLITUS WITH COMPLICATION, WITHOUT LONG-TERM CURRENT USE OF INSULIN (HCC): ICD-10-CM

## 2019-01-11 RX ORDER — SITAGLIPTIN 100 MG/1
TABLET, FILM COATED ORAL
Qty: 30 TABLET | Refills: 5 | Status: SHIPPED | OUTPATIENT
Start: 2019-01-11 | End: 2019-08-15 | Stop reason: SDUPTHER

## 2019-04-09 DIAGNOSIS — E11.9 TYPE 2 DIABETES MELLITUS WITHOUT COMPLICATION, WITHOUT LONG-TERM CURRENT USE OF INSULIN (HCC): ICD-10-CM

## 2019-04-10 RX ORDER — ATORVASTATIN CALCIUM 10 MG/1
TABLET, FILM COATED ORAL
Qty: 30 TABLET | Refills: 5 | Status: SHIPPED | OUTPATIENT
Start: 2019-04-10 | End: 2019-12-05 | Stop reason: SDUPTHER

## 2019-04-19 ENCOUNTER — HOSPITAL ENCOUNTER (INPATIENT)
Facility: HOSPITAL | Age: 44
LOS: 1 days | Discharge: HOME | DRG: 203 | End: 2019-04-21
Attending: EMERGENCY MEDICINE | Admitting: INTERNAL MEDICINE
Payer: COMMERCIAL

## 2019-04-19 DIAGNOSIS — R07.9 CHEST PAIN: Primary | ICD-10-CM

## 2019-04-19 DIAGNOSIS — E83.42 HYPOMAGNESEMIA: ICD-10-CM

## 2019-04-19 LAB
BASOPHILS # BLD AUTO: 0.04 THOUSANDS/ΜL (ref 0–0.1)
BASOPHILS NFR BLD AUTO: 1 % (ref 0–1)
EOSINOPHIL # BLD AUTO: 0.16 THOUSAND/ΜL (ref 0–0.61)
EOSINOPHIL NFR BLD AUTO: 2 % (ref 0–6)
ERYTHROCYTE [DISTWIDTH] IN BLOOD BY AUTOMATED COUNT: 13 % (ref 11.6–15.1)
HCT VFR BLD AUTO: 39 % (ref 36.5–49.3)
HGB BLD-MCNC: 13.2 G/DL (ref 12–17)
IMM GRANULOCYTES # BLD AUTO: 0.04 THOUSAND/UL (ref 0–0.2)
IMM GRANULOCYTES NFR BLD AUTO: 1 % (ref 0–2)
LYMPHOCYTES # BLD AUTO: 2.71 THOUSANDS/ΜL (ref 0.6–4.47)
LYMPHOCYTES NFR BLD AUTO: 32 % (ref 14–44)
MCH RBC QN AUTO: 29.3 PG (ref 26.8–34.3)
MCHC RBC AUTO-ENTMCNC: 33.8 G/DL (ref 31.4–37.4)
MCV RBC AUTO: 87 FL (ref 82–98)
MONOCYTES # BLD AUTO: 0.62 THOUSAND/ΜL (ref 0.17–1.22)
MONOCYTES NFR BLD AUTO: 7 % (ref 4–12)
NEUTROPHILS # BLD AUTO: 5.01 THOUSANDS/ΜL (ref 1.85–7.62)
NEUTS SEG NFR BLD AUTO: 57 % (ref 43–75)
NRBC BLD AUTO-RTO: 0 /100 WBCS
PLATELET # BLD AUTO: 215 THOUSANDS/UL (ref 149–390)
PMV BLD AUTO: 9.6 FL (ref 8.9–12.7)
RBC # BLD AUTO: 4.5 MILLION/UL (ref 3.88–5.62)
WBC # BLD AUTO: 8.58 THOUSAND/UL (ref 4.31–10.16)

## 2019-04-19 PROCEDURE — 36415 COLL VENOUS BLD VENIPUNCTURE: CPT | Performed by: EMERGENCY MEDICINE

## 2019-04-19 PROCEDURE — 84484 ASSAY OF TROPONIN QUANT: CPT | Performed by: EMERGENCY MEDICINE

## 2019-04-19 PROCEDURE — 85379 FIBRIN DEGRADATION QUANT: CPT | Performed by: EMERGENCY MEDICINE

## 2019-04-19 PROCEDURE — 85730 THROMBOPLASTIN TIME PARTIAL: CPT | Performed by: EMERGENCY MEDICINE

## 2019-04-19 PROCEDURE — 99285 EMERGENCY DEPT VISIT HI MDM: CPT

## 2019-04-19 PROCEDURE — 93005 ELECTROCARDIOGRAM TRACING: CPT

## 2019-04-19 PROCEDURE — 85025 COMPLETE CBC W/AUTO DIFF WBC: CPT | Performed by: EMERGENCY MEDICINE

## 2019-04-19 PROCEDURE — 85610 PROTHROMBIN TIME: CPT | Performed by: EMERGENCY MEDICINE

## 2019-04-19 PROCEDURE — 80053 COMPREHEN METABOLIC PANEL: CPT | Performed by: EMERGENCY MEDICINE

## 2019-04-20 ENCOUNTER — APPOINTMENT (INPATIENT)
Dept: CT IMAGING | Facility: HOSPITAL | Age: 44
DRG: 203 | End: 2019-04-20
Payer: COMMERCIAL

## 2019-04-20 ENCOUNTER — APPOINTMENT (EMERGENCY)
Dept: RADIOLOGY | Facility: HOSPITAL | Age: 44
DRG: 203 | End: 2019-04-20
Payer: COMMERCIAL

## 2019-04-20 PROBLEM — R42 DIZZINESS: Status: ACTIVE | Noted: 2019-04-20

## 2019-04-20 PROBLEM — R07.9 CHEST PAIN WITH MODERATE RISK FOR CARDIAC ETIOLOGY: Status: ACTIVE | Noted: 2019-04-20

## 2019-04-20 LAB
ALBUMIN SERPL BCP-MCNC: 3.1 G/DL (ref 3.5–5)
ALBUMIN SERPL BCP-MCNC: 3.4 G/DL (ref 3.5–5)
ALP SERPL-CCNC: 51 U/L (ref 46–116)
ALP SERPL-CCNC: 55 U/L (ref 46–116)
ALT SERPL W P-5'-P-CCNC: 48 U/L (ref 12–78)
ALT SERPL W P-5'-P-CCNC: 64 U/L (ref 12–78)
ANION GAP SERPL CALCULATED.3IONS-SCNC: 10 MMOL/L (ref 4–13)
ANION GAP SERPL CALCULATED.3IONS-SCNC: 10 MMOL/L (ref 4–13)
APTT PPP: 26 SECONDS (ref 26–38)
AST SERPL W P-5'-P-CCNC: 26 U/L (ref 5–45)
AST SERPL W P-5'-P-CCNC: 29 U/L (ref 5–45)
ATRIAL RATE: 62 BPM
BILIRUB SERPL-MCNC: 0.4 MG/DL (ref 0.2–1)
BILIRUB SERPL-MCNC: 0.5 MG/DL (ref 0.2–1)
BUN SERPL-MCNC: 13 MG/DL (ref 5–25)
BUN SERPL-MCNC: 15 MG/DL (ref 5–25)
CALCIUM SERPL-MCNC: 9 MG/DL (ref 8.3–10.1)
CALCIUM SERPL-MCNC: 9.3 MG/DL (ref 8.3–10.1)
CHLORIDE SERPL-SCNC: 101 MMOL/L (ref 100–108)
CHLORIDE SERPL-SCNC: 99 MMOL/L (ref 100–108)
CHOLEST SERPL-MCNC: 104 MG/DL (ref 50–200)
CO2 SERPL-SCNC: 26 MMOL/L (ref 21–32)
CO2 SERPL-SCNC: 27 MMOL/L (ref 21–32)
CREAT SERPL-MCNC: 0.86 MG/DL (ref 0.6–1.3)
CREAT SERPL-MCNC: 0.9 MG/DL (ref 0.6–1.3)
DEPRECATED D DIMER PPP: 461 NG/ML (FEU)
ERYTHROCYTE [DISTWIDTH] IN BLOOD BY AUTOMATED COUNT: 13.2 % (ref 11.6–15.1)
EST. AVERAGE GLUCOSE BLD GHB EST-MCNC: 171 MG/DL
GFR SERPL CREATININE-BSD FRML MDRD: 104 ML/MIN/1.73SQ M
GFR SERPL CREATININE-BSD FRML MDRD: 106 ML/MIN/1.73SQ M
GLUCOSE SERPL-MCNC: 120 MG/DL (ref 65–140)
GLUCOSE SERPL-MCNC: 132 MG/DL (ref 65–140)
GLUCOSE SERPL-MCNC: 154 MG/DL (ref 65–140)
GLUCOSE SERPL-MCNC: 223 MG/DL (ref 65–140)
GLUCOSE SERPL-MCNC: 236 MG/DL (ref 65–140)
GLUCOSE SERPL-MCNC: 298 MG/DL (ref 65–140)
HBA1C MFR BLD: 7.6 % (ref 4.2–6.3)
HCT VFR BLD AUTO: 39.7 % (ref 36.5–49.3)
HDLC SERPL-MCNC: 26 MG/DL (ref 40–60)
HGB BLD-MCNC: 13.4 G/DL (ref 12–17)
INR PPP: 1.19 (ref 0.86–1.17)
LDLC SERPL CALC-MCNC: 54 MG/DL (ref 0–100)
MAGNESIUM SERPL-MCNC: 1.3 MG/DL (ref 1.6–2.6)
MCH RBC QN AUTO: 29.3 PG (ref 26.8–34.3)
MCHC RBC AUTO-ENTMCNC: 33.8 G/DL (ref 31.4–37.4)
MCV RBC AUTO: 87 FL (ref 82–98)
NONHDLC SERPL-MCNC: 78 MG/DL
P AXIS: 36 DEGREES
PHOSPHATE SERPL-MCNC: 4.3 MG/DL (ref 2.7–4.5)
PLATELET # BLD AUTO: 214 THOUSANDS/UL (ref 149–390)
PMV BLD AUTO: 9.3 FL (ref 8.9–12.7)
POTASSIUM SERPL-SCNC: 3.5 MMOL/L (ref 3.5–5.3)
POTASSIUM SERPL-SCNC: 3.9 MMOL/L (ref 3.5–5.3)
PR INTERVAL: 136 MS
PROT SERPL-MCNC: 7 G/DL (ref 6.4–8.2)
PROT SERPL-MCNC: 7.6 G/DL (ref 6.4–8.2)
PROTHROMBIN TIME: 14.5 SECONDS (ref 11.8–14.2)
QRS AXIS: 7 DEGREES
QRSD INTERVAL: 86 MS
QT INTERVAL: 390 MS
QTC INTERVAL: 395 MS
RBC # BLD AUTO: 4.58 MILLION/UL (ref 3.88–5.62)
SODIUM SERPL-SCNC: 136 MMOL/L (ref 136–145)
SODIUM SERPL-SCNC: 137 MMOL/L (ref 136–145)
T WAVE AXIS: 10 DEGREES
TRIGL SERPL-MCNC: 121 MG/DL
TROPONIN I SERPL-MCNC: <0.02 NG/ML
VENTRICULAR RATE: 62 BPM
WBC # BLD AUTO: 8.92 THOUSAND/UL (ref 4.31–10.16)

## 2019-04-20 PROCEDURE — 82948 REAGENT STRIP/BLOOD GLUCOSE: CPT

## 2019-04-20 PROCEDURE — 71275 CT ANGIOGRAPHY CHEST: CPT

## 2019-04-20 PROCEDURE — 83735 ASSAY OF MAGNESIUM: CPT | Performed by: PHYSICIAN ASSISTANT

## 2019-04-20 PROCEDURE — 84484 ASSAY OF TROPONIN QUANT: CPT | Performed by: PHYSICIAN ASSISTANT

## 2019-04-20 PROCEDURE — 99285 EMERGENCY DEPT VISIT HI MDM: CPT | Performed by: EMERGENCY MEDICINE

## 2019-04-20 PROCEDURE — 70496 CT ANGIOGRAPHY HEAD: CPT

## 2019-04-20 PROCEDURE — 80053 COMPREHEN METABOLIC PANEL: CPT | Performed by: PHYSICIAN ASSISTANT

## 2019-04-20 PROCEDURE — 94760 N-INVAS EAR/PLS OXIMETRY 1: CPT

## 2019-04-20 PROCEDURE — 94762 N-INVAS EAR/PLS OXIMTRY CONT: CPT

## 2019-04-20 PROCEDURE — 93005 ELECTROCARDIOGRAM TRACING: CPT

## 2019-04-20 PROCEDURE — 93010 ELECTROCARDIOGRAM REPORT: CPT | Performed by: INTERNAL MEDICINE

## 2019-04-20 PROCEDURE — 99219 PR INITIAL OBSERVATION CARE/DAY 50 MINUTES: CPT | Performed by: PHYSICIAN ASSISTANT

## 2019-04-20 PROCEDURE — 70498 CT ANGIOGRAPHY NECK: CPT

## 2019-04-20 PROCEDURE — 85027 COMPLETE CBC AUTOMATED: CPT | Performed by: PHYSICIAN ASSISTANT

## 2019-04-20 PROCEDURE — 83036 HEMOGLOBIN GLYCOSYLATED A1C: CPT | Performed by: PHYSICIAN ASSISTANT

## 2019-04-20 PROCEDURE — 71046 X-RAY EXAM CHEST 2 VIEWS: CPT

## 2019-04-20 PROCEDURE — 80061 LIPID PANEL: CPT | Performed by: PHYSICIAN ASSISTANT

## 2019-04-20 PROCEDURE — 84100 ASSAY OF PHOSPHORUS: CPT | Performed by: PHYSICIAN ASSISTANT

## 2019-04-20 RX ORDER — ATORVASTATIN CALCIUM 10 MG/1
10 TABLET, FILM COATED ORAL DAILY
Status: DISCONTINUED | OUTPATIENT
Start: 2019-04-20 | End: 2019-04-21 | Stop reason: HOSPADM

## 2019-04-20 RX ORDER — NITROGLYCERIN 0.4 MG/1
0.4 TABLET SUBLINGUAL
Status: DISCONTINUED | OUTPATIENT
Start: 2019-04-20 | End: 2019-04-21 | Stop reason: HOSPADM

## 2019-04-20 RX ORDER — ONDANSETRON 2 MG/ML
4 INJECTION INTRAMUSCULAR; INTRAVENOUS EVERY 6 HOURS PRN
Status: DISCONTINUED | OUTPATIENT
Start: 2019-04-20 | End: 2019-04-21 | Stop reason: HOSPADM

## 2019-04-20 RX ORDER — MAGNESIUM SULFATE HEPTAHYDRATE 40 MG/ML
2 INJECTION, SOLUTION INTRAVENOUS ONCE
Status: COMPLETED | OUTPATIENT
Start: 2019-04-20 | End: 2019-04-20

## 2019-04-20 RX ORDER — ACETAMINOPHEN 325 MG/1
650 TABLET ORAL EVERY 6 HOURS PRN
Status: DISCONTINUED | OUTPATIENT
Start: 2019-04-20 | End: 2019-04-21 | Stop reason: HOSPADM

## 2019-04-20 RX ORDER — LISINOPRIL 10 MG/1
10 TABLET ORAL DAILY
Status: DISCONTINUED | OUTPATIENT
Start: 2019-04-20 | End: 2019-04-21 | Stop reason: HOSPADM

## 2019-04-20 RX ORDER — DIVALPROEX SODIUM 250 MG/1
500 TABLET, DELAYED RELEASE ORAL 2 TIMES DAILY
Status: DISCONTINUED | OUTPATIENT
Start: 2019-04-20 | End: 2019-04-21 | Stop reason: HOSPADM

## 2019-04-20 RX ORDER — METHYLPREDNISOLONE SODIUM SUCCINATE 125 MG/2ML
100 INJECTION, POWDER, LYOPHILIZED, FOR SOLUTION INTRAMUSCULAR; INTRAVENOUS ONCE
Status: COMPLETED | OUTPATIENT
Start: 2019-04-20 | End: 2019-04-20

## 2019-04-20 RX ORDER — DIPHENHYDRAMINE HYDROCHLORIDE 50 MG/ML
25 INJECTION INTRAMUSCULAR; INTRAVENOUS ONCE
Status: COMPLETED | OUTPATIENT
Start: 2019-04-20 | End: 2019-04-20

## 2019-04-20 RX ORDER — NITROGLYCERIN 0.4 MG/1
0.4 TABLET SUBLINGUAL ONCE
Status: COMPLETED | OUTPATIENT
Start: 2019-04-20 | End: 2019-04-20

## 2019-04-20 RX ORDER — RISPERIDONE 1 MG/1
2 TABLET, FILM COATED ORAL 2 TIMES DAILY
Status: DISCONTINUED | OUTPATIENT
Start: 2019-04-20 | End: 2019-04-21 | Stop reason: HOSPADM

## 2019-04-20 RX ORDER — HEPARIN SODIUM 5000 [USP'U]/ML
5000 INJECTION, SOLUTION INTRAVENOUS; SUBCUTANEOUS EVERY 8 HOURS SCHEDULED
Status: DISCONTINUED | OUTPATIENT
Start: 2019-04-20 | End: 2019-04-21 | Stop reason: HOSPADM

## 2019-04-20 RX ORDER — SODIUM CHLORIDE 9 MG/ML
100 INJECTION, SOLUTION INTRAVENOUS CONTINUOUS
Status: DISCONTINUED | OUTPATIENT
Start: 2019-04-20 | End: 2019-04-21 | Stop reason: HOSPADM

## 2019-04-20 RX ORDER — ASPIRIN 81 MG/1
81 TABLET, CHEWABLE ORAL DAILY
Status: DISCONTINUED | OUTPATIENT
Start: 2019-04-20 | End: 2019-04-21 | Stop reason: HOSPADM

## 2019-04-20 RX ORDER — ASPIRIN 81 MG/1
324 TABLET, CHEWABLE ORAL ONCE
Status: COMPLETED | OUTPATIENT
Start: 2019-04-20 | End: 2019-04-20

## 2019-04-20 RX ADMIN — IODIXANOL 70 ML: 320 INJECTION, SOLUTION INTRAVASCULAR at 17:30

## 2019-04-20 RX ADMIN — IODIXANOL 70 ML: 320 INJECTION, SOLUTION INTRAVASCULAR at 17:32

## 2019-04-20 RX ADMIN — HEPARIN SODIUM 5000 UNITS: 5000 INJECTION INTRAVENOUS; SUBCUTANEOUS at 16:00

## 2019-04-20 RX ADMIN — ASPIRIN 81 MG 324 MG: 81 TABLET ORAL at 00:44

## 2019-04-20 RX ADMIN — LISINOPRIL 10 MG: 10 TABLET ORAL at 09:11

## 2019-04-20 RX ADMIN — MAGNESIUM SULFATE HEPTAHYDRATE 2 G: 40 INJECTION, SOLUTION INTRAVENOUS at 07:09

## 2019-04-20 RX ADMIN — DIPHENHYDRAMINE HYDROCHLORIDE 25 MG: 50 INJECTION, SOLUTION INTRAMUSCULAR; INTRAVENOUS at 15:55

## 2019-04-20 RX ADMIN — SODIUM CHLORIDE 100 ML/HR: 0.9 INJECTION, SOLUTION INTRAVENOUS at 20:25

## 2019-04-20 RX ADMIN — ATORVASTATIN CALCIUM 10 MG: 10 TABLET, FILM COATED ORAL at 09:11

## 2019-04-20 RX ADMIN — DIVALPROEX SODIUM 500 MG: 250 TABLET, DELAYED RELEASE ORAL at 09:11

## 2019-04-20 RX ADMIN — INSULIN LISPRO 2 UNITS: 100 INJECTION, SOLUTION INTRAVENOUS; SUBCUTANEOUS at 07:34

## 2019-04-20 RX ADMIN — METHYLPREDNISOLONE SODIUM SUCCINATE 100 MG: 125 INJECTION, POWDER, FOR SOLUTION INTRAMUSCULAR; INTRAVENOUS at 12:42

## 2019-04-20 RX ADMIN — HEPARIN SODIUM 5000 UNITS: 5000 INJECTION INTRAVENOUS; SUBCUTANEOUS at 06:03

## 2019-04-20 RX ADMIN — INSULIN LISPRO 6 UNITS: 100 INJECTION, SOLUTION INTRAVENOUS; SUBCUTANEOUS at 21:32

## 2019-04-20 RX ADMIN — ASPIRIN 81 MG 81 MG: 81 TABLET ORAL at 09:11

## 2019-04-20 RX ADMIN — RISPERIDONE 2 MG: 1 TABLET ORAL at 09:12

## 2019-04-20 RX ADMIN — HEPARIN SODIUM 5000 UNITS: 5000 INJECTION INTRAVENOUS; SUBCUTANEOUS at 21:30

## 2019-04-20 RX ADMIN — INSULIN LISPRO 4 UNITS: 100 INJECTION, SOLUTION INTRAVENOUS; SUBCUTANEOUS at 12:45

## 2019-04-20 RX ADMIN — SODIUM CHLORIDE 100 ML/HR: 0.9 INJECTION, SOLUTION INTRAVENOUS at 10:11

## 2019-04-20 RX ADMIN — RISPERIDONE 2 MG: 1 TABLET ORAL at 18:22

## 2019-04-20 RX ADMIN — DIVALPROEX SODIUM 500 MG: 250 TABLET, DELAYED RELEASE ORAL at 18:22

## 2019-04-20 RX ADMIN — MORPHINE SULFATE 1 MG: 2 INJECTION, SOLUTION INTRAMUSCULAR; INTRAVENOUS at 05:02

## 2019-04-20 RX ADMIN — NITROGLYCERIN 0.4 MG: 0.4 TABLET SUBLINGUAL at 00:44

## 2019-04-20 RX ADMIN — NITROGLYCERIN 0.4 MG: 0.4 TABLET SUBLINGUAL at 10:25

## 2019-04-21 VITALS
RESPIRATION RATE: 16 BRPM | BODY MASS INDEX: 41.9 KG/M2 | OXYGEN SATURATION: 92 % | HEART RATE: 68 BPM | SYSTOLIC BLOOD PRESSURE: 112 MMHG | TEMPERATURE: 97.8 F | HEIGHT: 68 IN | WEIGHT: 276.46 LBS | DIASTOLIC BLOOD PRESSURE: 55 MMHG

## 2019-04-21 PROBLEM — G47.33 OBSTRUCTIVE SLEEP APNEA SYNDROME: Status: ACTIVE | Noted: 2019-04-21

## 2019-04-21 PROBLEM — E83.42 HYPOMAGNESEMIA: Status: ACTIVE | Noted: 2019-04-21

## 2019-04-21 LAB
ANION GAP SERPL CALCULATED.3IONS-SCNC: 10 MMOL/L (ref 4–13)
BASOPHILS # BLD AUTO: 0.01 THOUSANDS/ΜL (ref 0–0.1)
BASOPHILS NFR BLD AUTO: 0 % (ref 0–1)
BUN SERPL-MCNC: 14 MG/DL (ref 5–25)
CALCIUM SERPL-MCNC: 9 MG/DL (ref 8.3–10.1)
CHLORIDE SERPL-SCNC: 100 MMOL/L (ref 100–108)
CO2 SERPL-SCNC: 24 MMOL/L (ref 21–32)
CREAT SERPL-MCNC: 0.92 MG/DL (ref 0.6–1.3)
EOSINOPHIL # BLD AUTO: 0 THOUSAND/ΜL (ref 0–0.61)
EOSINOPHIL NFR BLD AUTO: 0 % (ref 0–6)
ERYTHROCYTE [DISTWIDTH] IN BLOOD BY AUTOMATED COUNT: 13.1 % (ref 11.6–15.1)
GFR SERPL CREATININE-BSD FRML MDRD: 102 ML/MIN/1.73SQ M
GLUCOSE SERPL-MCNC: 197 MG/DL (ref 65–140)
GLUCOSE SERPL-MCNC: 215 MG/DL (ref 65–140)
HCT VFR BLD AUTO: 36.6 % (ref 36.5–49.3)
HGB BLD-MCNC: 12.4 G/DL (ref 12–17)
IMM GRANULOCYTES # BLD AUTO: 0.17 THOUSAND/UL (ref 0–0.2)
IMM GRANULOCYTES NFR BLD AUTO: 1 % (ref 0–2)
LYMPHOCYTES # BLD AUTO: 1.22 THOUSANDS/ΜL (ref 0.6–4.47)
LYMPHOCYTES NFR BLD AUTO: 10 % (ref 14–44)
MAGNESIUM SERPL-MCNC: 1.5 MG/DL (ref 1.6–2.6)
MCH RBC QN AUTO: 29.2 PG (ref 26.8–34.3)
MCHC RBC AUTO-ENTMCNC: 33.9 G/DL (ref 31.4–37.4)
MCV RBC AUTO: 86 FL (ref 82–98)
MONOCYTES # BLD AUTO: 0.26 THOUSAND/ΜL (ref 0.17–1.22)
MONOCYTES NFR BLD AUTO: 2 % (ref 4–12)
NEUTROPHILS # BLD AUTO: 10.09 THOUSANDS/ΜL (ref 1.85–7.62)
NEUTS SEG NFR BLD AUTO: 87 % (ref 43–75)
NRBC BLD AUTO-RTO: 0 /100 WBCS
PLATELET # BLD AUTO: 206 THOUSANDS/UL (ref 149–390)
PMV BLD AUTO: 9.4 FL (ref 8.9–12.7)
POTASSIUM SERPL-SCNC: 4.3 MMOL/L (ref 3.5–5.3)
RBC # BLD AUTO: 4.25 MILLION/UL (ref 3.88–5.62)
SODIUM SERPL-SCNC: 134 MMOL/L (ref 136–145)
WBC # BLD AUTO: 11.75 THOUSAND/UL (ref 4.31–10.16)

## 2019-04-21 PROCEDURE — 82948 REAGENT STRIP/BLOOD GLUCOSE: CPT

## 2019-04-21 PROCEDURE — 83735 ASSAY OF MAGNESIUM: CPT | Performed by: PHYSICIAN ASSISTANT

## 2019-04-21 PROCEDURE — 99239 HOSP IP/OBS DSCHRG MGMT >30: CPT | Performed by: PHYSICIAN ASSISTANT

## 2019-04-21 PROCEDURE — 85025 COMPLETE CBC W/AUTO DIFF WBC: CPT | Performed by: PHYSICIAN ASSISTANT

## 2019-04-21 PROCEDURE — 80048 BASIC METABOLIC PNL TOTAL CA: CPT | Performed by: PHYSICIAN ASSISTANT

## 2019-04-21 RX ORDER — MAGNESIUM SULFATE 1 G/100ML
1 INJECTION INTRAVENOUS ONCE
Status: COMPLETED | OUTPATIENT
Start: 2019-04-21 | End: 2019-04-21

## 2019-04-21 RX ADMIN — MAGNESIUM SULFATE HEPTAHYDRATE 1 G: 1 INJECTION, SOLUTION INTRAVENOUS at 08:26

## 2019-04-21 RX ADMIN — ASPIRIN 81 MG 81 MG: 81 TABLET ORAL at 08:24

## 2019-04-21 RX ADMIN — RISPERIDONE 2 MG: 1 TABLET ORAL at 08:25

## 2019-04-21 RX ADMIN — LISINOPRIL 10 MG: 10 TABLET ORAL at 08:24

## 2019-04-21 RX ADMIN — INSULIN LISPRO 2 UNITS: 100 INJECTION, SOLUTION INTRAVENOUS; SUBCUTANEOUS at 07:19

## 2019-04-21 RX ADMIN — SODIUM CHLORIDE 100 ML/HR: 0.9 INJECTION, SOLUTION INTRAVENOUS at 06:22

## 2019-04-21 RX ADMIN — MAGNESIUM OXIDE TAB 400 MG (241.3 MG ELEMENTAL MG) 400 MG: 400 (241.3 MG) TAB at 08:24

## 2019-04-21 RX ADMIN — DIVALPROEX SODIUM 500 MG: 250 TABLET, DELAYED RELEASE ORAL at 08:24

## 2019-04-21 RX ADMIN — ATORVASTATIN CALCIUM 10 MG: 10 TABLET, FILM COATED ORAL at 08:24

## 2019-04-21 RX ADMIN — HEPARIN SODIUM 5000 UNITS: 5000 INJECTION INTRAVENOUS; SUBCUTANEOUS at 05:37

## 2019-04-22 LAB
ATRIAL RATE: 70 BPM
ATRIAL RATE: 82 BPM
P AXIS: 35 DEGREES
P AXIS: 45 DEGREES
PR INTERVAL: 130 MS
PR INTERVAL: 132 MS
QRS AXIS: 11 DEGREES
QRS AXIS: 27 DEGREES
QRSD INTERVAL: 86 MS
QRSD INTERVAL: 88 MS
QT INTERVAL: 360 MS
QT INTERVAL: 380 MS
QTC INTERVAL: 410 MS
QTC INTERVAL: 420 MS
T WAVE AXIS: 21 DEGREES
T WAVE AXIS: 35 DEGREES
VENTRICULAR RATE: 70 BPM
VENTRICULAR RATE: 82 BPM

## 2019-04-22 PROCEDURE — 93010 ELECTROCARDIOGRAM REPORT: CPT | Performed by: INTERNAL MEDICINE

## 2019-04-23 ENCOUNTER — TRANSITIONAL CARE MANAGEMENT (OUTPATIENT)
Dept: FAMILY MEDICINE CLINIC | Facility: CLINIC | Age: 44
End: 2019-04-23

## 2019-05-14 DIAGNOSIS — E11.9 TYPE 2 DIABETES MELLITUS WITHOUT COMPLICATION, WITHOUT LONG-TERM CURRENT USE OF INSULIN (HCC): ICD-10-CM

## 2019-05-28 ENCOUNTER — TRANSCRIBE ORDERS (OUTPATIENT)
Dept: ADMINISTRATIVE | Facility: HOSPITAL | Age: 44
End: 2019-05-28

## 2019-05-28 ENCOUNTER — APPOINTMENT (OUTPATIENT)
Dept: LAB | Facility: HOSPITAL | Age: 44
End: 2019-05-28
Payer: COMMERCIAL

## 2019-05-28 DIAGNOSIS — Z13.9 SCREENING FOR UNSPECIFIED CONDITION: ICD-10-CM

## 2019-05-28 DIAGNOSIS — N13.8 ENLARGED PROSTATE WITH URINARY OBSTRUCTION: Primary | ICD-10-CM

## 2019-05-28 DIAGNOSIS — Z79.899 ENCOUNTER FOR LONG-TERM (CURRENT) USE OF OTHER MEDICATIONS: ICD-10-CM

## 2019-05-28 DIAGNOSIS — E55.9 VITAMIN D DEFICIENCY, UNSPECIFIED: ICD-10-CM

## 2019-05-28 DIAGNOSIS — E78.5 HYPERLIPIDEMIA, UNSPECIFIED HYPERLIPIDEMIA TYPE: ICD-10-CM

## 2019-05-28 DIAGNOSIS — N40.1 ENLARGED PROSTATE WITH URINARY OBSTRUCTION: ICD-10-CM

## 2019-05-28 DIAGNOSIS — N13.8 ENLARGED PROSTATE WITH URINARY OBSTRUCTION: ICD-10-CM

## 2019-05-28 DIAGNOSIS — N40.1 ENLARGED PROSTATE WITH URINARY OBSTRUCTION: Primary | ICD-10-CM

## 2019-05-28 LAB
ALBUMIN SERPL BCP-MCNC: 3.5 G/DL (ref 3.5–5)
ALP SERPL-CCNC: 54 U/L (ref 46–116)
ALT SERPL W P-5'-P-CCNC: 44 U/L (ref 12–78)
ANION GAP SERPL CALCULATED.3IONS-SCNC: 12 MMOL/L (ref 4–13)
AST SERPL W P-5'-P-CCNC: 25 U/L (ref 5–45)
BASOPHILS # BLD AUTO: 0.05 THOUSANDS/ΜL (ref 0–0.1)
BASOPHILS NFR BLD AUTO: 1 % (ref 0–1)
BILIRUB SERPL-MCNC: 0.6 MG/DL (ref 0.2–1)
BUN SERPL-MCNC: 14 MG/DL (ref 5–25)
CALCIUM SERPL-MCNC: 9.4 MG/DL (ref 8.3–10.1)
CHLORIDE SERPL-SCNC: 102 MMOL/L (ref 100–108)
CHOLEST SERPL-MCNC: 136 MG/DL (ref 50–200)
CO2 SERPL-SCNC: 24 MMOL/L (ref 21–32)
CREAT SERPL-MCNC: 0.87 MG/DL (ref 0.6–1.3)
EOSINOPHIL # BLD AUTO: 0.15 THOUSAND/ΜL (ref 0–0.61)
EOSINOPHIL NFR BLD AUTO: 2 % (ref 0–6)
ERYTHROCYTE [DISTWIDTH] IN BLOOD BY AUTOMATED COUNT: 13.2 % (ref 11.6–15.1)
EST. AVERAGE GLUCOSE BLD GHB EST-MCNC: 166 MG/DL
FOLATE SERPL-MCNC: 15.3 NG/ML (ref 3.1–17.5)
GFR SERPL CREATININE-BSD FRML MDRD: 106 ML/MIN/1.73SQ M
GLUCOSE P FAST SERPL-MCNC: 142 MG/DL (ref 65–99)
HBA1C MFR BLD: 7.4 % (ref 4.2–6.3)
HCT VFR BLD AUTO: 43.6 % (ref 36.5–49.3)
HDLC SERPL-MCNC: 30 MG/DL (ref 40–60)
HGB BLD-MCNC: 14.6 G/DL (ref 12–17)
IMM GRANULOCYTES # BLD AUTO: 0.06 THOUSAND/UL (ref 0–0.2)
IMM GRANULOCYTES NFR BLD AUTO: 1 % (ref 0–2)
LDLC SERPL CALC-MCNC: 64 MG/DL (ref 0–100)
LYMPHOCYTES # BLD AUTO: 2.69 THOUSANDS/ΜL (ref 0.6–4.47)
LYMPHOCYTES NFR BLD AUTO: 31 % (ref 14–44)
MCH RBC QN AUTO: 29 PG (ref 26.8–34.3)
MCHC RBC AUTO-ENTMCNC: 33.5 G/DL (ref 31.4–37.4)
MCV RBC AUTO: 87 FL (ref 82–98)
MONOCYTES # BLD AUTO: 0.44 THOUSAND/ΜL (ref 0.17–1.22)
MONOCYTES NFR BLD AUTO: 5 % (ref 4–12)
NEUTROPHILS # BLD AUTO: 5.19 THOUSANDS/ΜL (ref 1.85–7.62)
NEUTS SEG NFR BLD AUTO: 60 % (ref 43–75)
NONHDLC SERPL-MCNC: 106 MG/DL
NRBC BLD AUTO-RTO: 0 /100 WBCS
PLATELET # BLD AUTO: 224 THOUSANDS/UL (ref 149–390)
PMV BLD AUTO: 9.6 FL (ref 8.9–12.7)
POTASSIUM SERPL-SCNC: 3.8 MMOL/L (ref 3.5–5.3)
PROT SERPL-MCNC: 7.8 G/DL (ref 6.4–8.2)
PSA SERPL-MCNC: 1.1 NG/ML (ref 0–4)
RBC # BLD AUTO: 5.04 MILLION/UL (ref 3.88–5.62)
SODIUM SERPL-SCNC: 138 MMOL/L (ref 136–145)
T4 FREE SERPL-MCNC: 0.91 NG/DL (ref 0.76–1.46)
TRIGL SERPL-MCNC: 210 MG/DL
TSH SERPL DL<=0.05 MIU/L-ACNC: 3.17 UIU/ML (ref 0.36–3.74)
VIT B12 SERPL-MCNC: 352 PG/ML (ref 100–900)
WBC # BLD AUTO: 8.58 THOUSAND/UL (ref 4.31–10.16)

## 2019-05-28 PROCEDURE — 82607 VITAMIN B-12: CPT

## 2019-05-28 PROCEDURE — 83036 HEMOGLOBIN GLYCOSYLATED A1C: CPT

## 2019-05-28 PROCEDURE — 82746 ASSAY OF FOLIC ACID SERUM: CPT

## 2019-05-28 PROCEDURE — 82306 VITAMIN D 25 HYDROXY: CPT

## 2019-05-28 PROCEDURE — 85025 COMPLETE CBC W/AUTO DIFF WBC: CPT

## 2019-05-28 PROCEDURE — 84443 ASSAY THYROID STIM HORMONE: CPT

## 2019-05-28 PROCEDURE — 84439 ASSAY OF FREE THYROXINE: CPT

## 2019-05-28 PROCEDURE — 80061 LIPID PANEL: CPT

## 2019-05-28 PROCEDURE — 80053 COMPREHEN METABOLIC PANEL: CPT

## 2019-05-28 PROCEDURE — G0103 PSA SCREENING: HCPCS

## 2019-05-28 PROCEDURE — 36415 COLL VENOUS BLD VENIPUNCTURE: CPT

## 2019-06-02 LAB
25(OH)D2 SERPL-MCNC: <1 NG/ML
25(OH)D3 SERPL-MCNC: 14 NG/ML
25(OH)D3+25(OH)D2 SERPL-MCNC: 15 NG/ML

## 2019-08-15 DIAGNOSIS — E11.8 TYPE 2 DIABETES MELLITUS WITH COMPLICATION, WITHOUT LONG-TERM CURRENT USE OF INSULIN (HCC): ICD-10-CM

## 2019-08-15 RX ORDER — SITAGLIPTIN 100 MG/1
TABLET, FILM COATED ORAL
Qty: 30 TABLET | Refills: 5 | Status: SHIPPED | OUTPATIENT
Start: 2019-08-15 | End: 2020-01-29

## 2019-09-12 DIAGNOSIS — E11.9 TYPE 2 DIABETES MELLITUS WITHOUT COMPLICATION, WITHOUT LONG-TERM CURRENT USE OF INSULIN (HCC): ICD-10-CM

## 2019-09-12 RX ORDER — LISINOPRIL 10 MG/1
TABLET ORAL
Qty: 30 TABLET | Refills: 5 | Status: SHIPPED | OUTPATIENT
Start: 2019-09-12 | End: 2020-03-12

## 2019-10-18 ENCOUNTER — APPOINTMENT (OUTPATIENT)
Dept: LAB | Facility: HOSPITAL | Age: 44
End: 2019-10-18
Payer: COMMERCIAL

## 2019-10-18 ENCOUNTER — TRANSCRIBE ORDERS (OUTPATIENT)
Dept: ADMINISTRATIVE | Facility: HOSPITAL | Age: 44
End: 2019-10-18

## 2019-10-18 DIAGNOSIS — Z79.899 LONG TERM USE OF DRUG: Primary | ICD-10-CM

## 2019-10-18 DIAGNOSIS — Z79.899 LONG TERM USE OF DRUG: ICD-10-CM

## 2019-10-18 LAB — VALPROATE SERPL-MCNC: 75 UG/ML (ref 50–100)

## 2019-10-18 PROCEDURE — 36415 COLL VENOUS BLD VENIPUNCTURE: CPT

## 2019-10-18 PROCEDURE — 80164 ASSAY DIPROPYLACETIC ACD TOT: CPT

## 2019-10-26 ENCOUNTER — HOSPITAL ENCOUNTER (EMERGENCY)
Facility: HOSPITAL | Age: 44
Discharge: HOME | End: 2019-10-26
Attending: EMERGENCY MEDICINE | Admitting: EMERGENCY MEDICINE
Payer: COMMERCIAL

## 2019-10-26 ENCOUNTER — APPOINTMENT (EMERGENCY)
Dept: CT IMAGING | Facility: HOSPITAL | Age: 44
End: 2019-10-26
Payer: COMMERCIAL

## 2019-10-26 VITALS
RESPIRATION RATE: 18 BRPM | HEIGHT: 68 IN | TEMPERATURE: 98.8 F | HEART RATE: 77 BPM | SYSTOLIC BLOOD PRESSURE: 157 MMHG | WEIGHT: 275.57 LBS | OXYGEN SATURATION: 99 % | DIASTOLIC BLOOD PRESSURE: 85 MMHG | BODY MASS INDEX: 41.77 KG/M2

## 2019-10-26 DIAGNOSIS — R10.9 RIGHT FLANK PAIN: Primary | ICD-10-CM

## 2019-10-26 DIAGNOSIS — R81 GLUCOSURIA WITH NORMAL SERUM GLUCOSE: ICD-10-CM

## 2019-10-26 LAB
ANION GAP SERPL CALCULATED.3IONS-SCNC: 10 MMOL/L (ref 4–13)
BILIRUB UR QL STRIP: NEGATIVE
BUN SERPL-MCNC: 16 MG/DL (ref 5–25)
CALCIUM SERPL-MCNC: 9.2 MG/DL (ref 8.3–10.1)
CHLORIDE SERPL-SCNC: 101 MMOL/L (ref 100–108)
CLARITY UR: CLEAR
CO2 SERPL-SCNC: 24 MMOL/L (ref 21–32)
COLOR UR: YELLOW
CREAT SERPL-MCNC: 0.66 MG/DL (ref 0.6–1.3)
GFR SERPL CREATININE-BSD FRML MDRD: 118 ML/MIN/1.73SQ M
GLUCOSE SERPL-MCNC: 102 MG/DL (ref 65–140)
GLUCOSE UR STRIP-MCNC: ABNORMAL MG/DL
HGB UR QL STRIP.AUTO: NEGATIVE
KETONES UR STRIP-MCNC: ABNORMAL MG/DL
LEUKOCYTE ESTERASE UR QL STRIP: NEGATIVE
NITRITE UR QL STRIP: NEGATIVE
PH UR STRIP.AUTO: 6 [PH]
POTASSIUM SERPL-SCNC: 5 MMOL/L (ref 3.5–5.3)
PROT UR STRIP-MCNC: NEGATIVE MG/DL
SODIUM SERPL-SCNC: 135 MMOL/L (ref 136–145)
SP GR UR STRIP.AUTO: 1.02 (ref 1–1.03)
UROBILINOGEN UR QL STRIP.AUTO: 1 E.U./DL

## 2019-10-26 PROCEDURE — 80048 BASIC METABOLIC PNL TOTAL CA: CPT | Performed by: EMERGENCY MEDICINE

## 2019-10-26 PROCEDURE — 36415 COLL VENOUS BLD VENIPUNCTURE: CPT | Performed by: EMERGENCY MEDICINE

## 2019-10-26 PROCEDURE — 74176 CT ABD & PELVIS W/O CONTRAST: CPT

## 2019-10-26 PROCEDURE — 99284 EMERGENCY DEPT VISIT MOD MDM: CPT

## 2019-10-26 PROCEDURE — 81003 URINALYSIS AUTO W/O SCOPE: CPT | Performed by: EMERGENCY MEDICINE

## 2019-10-26 PROCEDURE — 99284 EMERGENCY DEPT VISIT MOD MDM: CPT | Performed by: EMERGENCY MEDICINE

## 2019-10-26 NOTE — ED NOTES
Spoke with Dr Valla Denver in regard to CBC   Verbal orders to hold on draw until after CT and CMP results     Jaclynn Harada, RN  10/26/19 9645

## 2019-10-27 NOTE — ED PROVIDER NOTES
History  Chief Complaint   Patient presents with    Flank Pain     right sided flank pain for about 2 weeks  concerned bc hx of kidney failure  Patient complains of right-sided flank pain for the past 2-3 months that worsened in the past 2 weeks  He states he has a h/o renal failure was concerned that the symptoms were related  Several years ago, he had an episode of renal failure due to obstructive pathology (he is uncertain exactly what, including if he had stones) for which he was given bilateral stents and hospitalized  He has regular blood work since then but is unaware if he continues to have renal failure  He does not see a urologist or a nephrologist   He does not recall any direct trauma to his right flank but states that he does exert himself periodically  He occasionally has hematuria but has not noted any penile discharge  He also notes that he has had hematospermia for several years  He denies any dysuria specifically  He has had no nausea, vomiting or diarrhea  He has not noted any fever or chills at home  He is a type 2 diabetic on metformin  No change in symptoms w/PO intake or BM  Denies f/c, CP, SOB, abdominal pain, n/v/d  12 system ROS o/w negative                  History provided by:  Patient and medical records  Flank Pain   Pain location:  R flank  Pain quality: aching    Pain radiates to:  RLQ  Pain severity:  Mild  Onset quality:  Gradual  Duration:  10 weeks  Timing:  Intermittent  Progression:  Waxing and waning  Chronicity:  Chronic  Context: previous surgery    Context: not awakening from sleep, not diet changes, not eating, not recent illness, not recent travel, not sick contacts, not suspicious food intake and not trauma    Relieved by:  None tried  Worsened by:  Nothing  Ineffective treatments:  None tried  Associated symptoms: dysuria and hematuria    Associated symptoms: no anorexia, no belching, no chest pain, no chills, no constipation, no cough, no diarrhea, no fatigue, no fever, no hematochezia, no melena, no nausea, no shortness of breath, no sore throat and no vomiting    Risk factors: obesity    Risk factors: no alcohol abuse, not elderly and has not had multiple surgeries        Prior to Admission Medications   Prescriptions Last Dose Informant Patient Reported? Taking? JANUVIA 100 MG tablet Not Taking at Unknown time  No No   Sig: take 1 tablet by mouth once daily   Patient not taking: Reported on 10/26/2019   aspirin 81 mg chewable tablet Past Week at Unknown time  Yes Yes   Sig: Chew 81 mg daily   atorvastatin (LIPITOR) 10 mg tablet Not Taking at Unknown time  No No   Sig: take 1 tablet by mouth once daily   Patient not taking: Reported on 10/26/2019   divalproex sodium (DEPAKOTE) 500 mg EC tablet 10/25/2019 at Unknown time  Yes Yes   Sig: Take 500 mg by mouth 2 (two) times a day     lisinopril (ZESTRIL) 10 mg tablet 10/25/2019 at Unknown time  No Yes   Sig: take 1 tablet by mouth once daily   magnesium oxide (MAG-OX) 400 mg   No No   Sig: Take 1 tablet (400 mg total) by mouth 2 (two) times a day   metFORMIN (GLUCOPHAGE) 1000 MG tablet 10/25/2019 at Unknown time  No Yes   Sig: take 1 tablet by mouth twice a day   naproxen (NAPROSYN) 500 mg tablet Not Taking at Unknown time  No No   Sig: Take 1 tablet (500 mg total) by mouth 3 (three) times a day with meals   Patient not taking: Reported on 4/20/2019   risperiDONE (RisperDAL) 2 mg tablet 10/25/2019 at Unknown time  No Yes   Sig: Take 1 tablet (2 mg total) by mouth 2 (two) times a day      Facility-Administered Medications: None       Past Medical History:   Diagnosis Date    Allergic rhinitis     resolved 6/20/17    Asthma     resolved 6/20/17    Bipolar 1 disorder [facility] [address] )     resolved 6/20/17    Chronic kidney disease     Diabetes mellitus ([facility] [address] )     Hematuria     resolved 6/20/17    Hyperlipidemia     Leukocytosis     resolved 6/20/17    Diana-Hussein tear     [facility]     resolved 6/20/17  Seizures ([facility] [address] )     Sleep apnea     resolved 6/20/17       Past Surgical History:   Procedure Laterality Date    ANGIOPLASTY      previous stent placement    CARDIAC SURGERY      NASAL SEPTUM SURGERY         Family History   Problem Relation Age of Onset    Cancer Mother     Diabetes Mother     Hypertension Mother     Asthma Father     Cancer Paternal Grandfather     Colon cancer Family      I have reviewed and agree with the history as documented  Social History     Tobacco Use    Smoking status: Never Smoker    Smokeless tobacco: Current User     Types: Chew   Substance Use Topics    Alcohol use: Never     Alcohol/week: 0 0 standard drinks     Frequency: Never     Drinks per session: Patient refused     Binge frequency: Never     Comment: occasional as per Allscripts    Drug use: No        Review of Systems   Constitutional: Negative for chills, diaphoresis, fatigue and fever  HENT: Negative for congestion, rhinorrhea, sore throat and trouble swallowing  Respiratory: Negative for cough, chest tightness, shortness of breath and wheezing  Cardiovascular: Negative for chest pain, palpitations and leg swelling  Gastrointestinal: Negative for abdominal pain, anorexia, constipation, diarrhea, hematochezia, melena, nausea and vomiting  Endocrine: Negative for polydipsia, polyphagia and polyuria  Genitourinary: Positive for dysuria, flank pain and hematuria  Negative for discharge, frequency, penile pain, testicular pain and urgency  Musculoskeletal: Negative for arthralgias, back pain, myalgias and neck pain  Skin: Negative for color change, pallor and rash  Neurological: Negative for dizziness, syncope, weakness, light-headedness, numbness and headaches  Hematological: Negative for adenopathy  Psychiatric/Behavioral: Negative for confusion  All other systems reviewed and are negative        Physical Exam  Physical Exam   Constitutional: He is oriented to person, place, and time  He appears well-developed and well-nourished  No distress  HENT:   Head: Normocephalic and atraumatic  Right Ear: External ear normal    Left Ear: External ear normal    Nose: Nose normal    Mouth/Throat: Oropharynx is clear and moist  No oropharyngeal exudate  Eyes: Pupils are equal, round, and reactive to light  Conjunctivae and EOM are normal  No scleral icterus  Neck: Normal range of motion  Neck supple  Cardiovascular: Normal rate, regular rhythm and normal heart sounds  No murmur heard  Pulmonary/Chest: Effort normal and breath sounds normal  No respiratory distress  He has no wheezes  He has no rales  Abdominal: Soft  Normal appearance and bowel sounds are normal  He exhibits no distension  There is tenderness (mild, right flank and mid abdomen)  There is no rebound and no guarding  Hernia confirmed negative in the right inguinal area and confirmed negative in the left inguinal area  Genitourinary: Testes normal and penis normal  No penile tenderness  Musculoskeletal: Normal range of motion  He exhibits no edema or tenderness  Neurological: He is alert and oriented to person, place, and time  He has normal reflexes  He exhibits normal muscle tone  Skin: Skin is warm and dry  No rash noted  He is not diaphoretic  No erythema  Psychiatric: He has a normal mood and affect  His behavior is normal  Thought content normal    Vitals reviewed        Vital Signs  ED Triage Vitals [10/26/19 1414]   Temperature Pulse Respirations Blood Pressure SpO2   98 7 °F (37 1 °C) 95 18 150/97 97 %      Temp Source Heart Rate Source Patient Position - Orthostatic VS BP Location FiO2 (%)   Temporal Monitor Sitting Right arm --      Pain Score       8           Vitals:    10/26/19 1414 10/26/19 1615 10/26/19 1715 10/26/19 1830   BP: 150/97 162/90 160/88 157/85   Pulse: 95 95 76 77   Patient Position - Orthostatic VS: Sitting Sitting Sitting Sitting         Visual Acuity      ED Medications  Medications - No data to display    Diagnostic Studies  Results Reviewed     Procedure Component Value Units Date/Time    [place] draw [[id number]] Collected:  10/26/19 1650    Lab Status:  Edited Result - FINAL Specimen:  Blood Updated:  10/26/19 2128    Narrative: The following orders were created for panel order [place] draw  Procedure                               Abnormality         Status                     ---------                               -----------         ------                     Yoli Kathy Top on JIRD[[id number]]                                                      Gold top on QFOE[[id number]]                                 Final result               Green / Yellow tube on SFYZ[[id number]]                                                 Green / Black tube on hold[[id number]]                                                  Lavender Top 3 ml on hold[[id number]]                                                   Lavender Top 7ml on BXMA[[id number]]                                                      Please view results for these tests on the individual orders      Basic metabolic panel [[id number]]  (Abnormal) Collected:  10/26/19 1650    Lab Status:  Final result Specimen:  Blood from Arm, Right Updated:  10/26/19 1711     Sodium 135 mmol/L      Potassium 5 0 mmol/L      Chloride 101 mmol/L      CO2 24 mmol/L      ANION GAP 10 mmol/L      BUN 16 mg/dL      Creatinine 0 66 mg/dL      Glucose 102 mg/dL      Calcium 9 2 mg/dL      eGFR 118 ml/min/1 73sq m     Narrative:       [place] guidelines for Chronic Kidney Disease (CKD):     Stage 1 with normal or high GFR (GFR > 90 mL/min/1 73 square meters)    Stage 2 Mild CKD (GFR = 60-89 mL/min/1 73 square meters)    Stage 3A Moderate CKD (GFR = 45-59 mL/min/1 73 square meters)    Stage 3B Moderate CKD (GFR = 30-44 mL/min/1 73 square meters)    Stage 4 Severe CKD (GFR = 15-29 mL/min/1 73 square meters)    Stage 5 End Stage CKD (GFR <15 mL/min/1 73 square meters)  Note: GFR calculation is accurate only with a steady state creatinine    UA w Reflex to Microscopic w Reflex to Culture [[id number]]  (Abnormal) Collected:  10/26/19 1418    Lab Status:  Final result Specimen:  Urine, Clean Catch Updated:  10/26/19 1502     Color, UA Yellow     Clarity, UA Clear     Specific Gravity, UA 1 025     pH, UA 6 0     Leukocytes, UA Negative     Nitrite, UA Negative     Protein, UA Negative mg/dl      Glucose,  (1/4%) mg/dl      Ketones, UA Trace mg/dl      Urobilinogen, UA 1 0 E U /dl      Bilirubin, UA Negative     Blood, UA Negative                 CT renal stone study abdomen pelvis without contrast   Final Result by Patria Wesley MD (10/26 1803)      No nephroureterolithiasis or findings of obstructive uropathy  Mild hepatic steatosis  Workstation performed: [id number]                    Procedures  Procedures       ED Course  ED Course as of Oct 27 1659   Sat Oct 26, 2019   0710   Results reviewed with patient  Feels better  All questions answered to the patient's satisfaction  Recommend continued supportive treatment at home and follow up with PCP  MDM  Number of Diagnoses or Management Options  Glucosuria with normal serum glucose:   Right flank pain:   Diagnosis management comments:   DDx: Flank pain - ureteral stone, UTI/pyelo, less likely DKA, doubt GI cause  A/P: Will check CT stone study, renal function, urine, treat symptoms, reevaluate for further w/u or disposition         Amount and/or Complexity of Data Reviewed  Clinical lab tests: reviewed and ordered  Tests in the radiology section of CPT®: reviewed and ordered  Review and summarize past medical records: yes        Disposition  Final diagnoses:   Right flank pain   Glucosuria with normal serum glucose     Time reflects when diagnosis was documented in both MDM as applicable and the Disposition within this note     Time User Action Codes Description Comment    10/26/2019  6:48 PM Aliyah Christie Add [R10 9] Right flank pain     10/26/2019  6:48 PM Aliyah Christie Add [R81] Glucosuria with normal serum glucose       ED Disposition     ED Disposition Condition Date/Time Comment    Discharge Stable Sat Oct 26, 2019  6:48 PM Claudia Fontanezell discharge to home/self care  Follow-up Information     Follow up With Specialties Details Why Contact Info    Le Reynolds DO Family Medicine Go in 2 days if symptoms do not improve [address]  [id number]  [phone]            Discharge Medication List as of 10/26/2019  6:49 PM      CONTINUE these medications which have NOT CHANGED    Details   aspirin 81 mg chewable tablet Chew 81 mg daily, Historical Med      divalproex sodium (DEPAKOTE) 500 mg EC tablet Take 500 mg by mouth 2 (two) times a day , Until Discontinued, Historical Med      lisinopril (ZESTRIL) 10 mg tablet take 1 tablet by mouth once daily, Normal      metFORMIN (GLUCOPHAGE) 1000 MG tablet take 1 tablet by mouth twice a day, Normal      risperiDONE (RisperDAL) 2 mg tablet Take 1 tablet (2 mg total) by mouth 2 (two) times a day, Starting Mon 9/17/2018, No Print      atorvastatin (LIPITOR) 10 mg tablet take 1 tablet by mouth once daily, Normal      JANUVIA 100 MG tablet take 1 tablet by mouth once daily, Normal      magnesium oxide (MAG-OX) 400 mg Take 1 tablet (400 mg total) by mouth 2 (two) times a day, Starting Sun 4/21/2019, Normal      naproxen (NAPROSYN) 500 mg tablet Take 1 tablet (500 mg total) by mouth 3 (three) times a day with meals, Starting Mon 8/13/2018, Normal           No discharge procedures on file      ED Provider  Electronically Signed by           Shannan Drummond DO  10/27/19 0572

## 2019-10-29 ENCOUNTER — OFFICE VISIT (OUTPATIENT)
Dept: FAMILY MEDICINE CLINIC | Facility: CLINIC | Age: 44
End: 2019-10-29
Payer: COMMERCIAL

## 2019-10-29 ENCOUNTER — APPOINTMENT (OUTPATIENT)
Dept: LAB | Facility: MEDICAL CENTER | Age: 44
End: 2019-10-29
Payer: COMMERCIAL

## 2019-10-29 VITALS
DIASTOLIC BLOOD PRESSURE: 80 MMHG | BODY MASS INDEX: 41.89 KG/M2 | SYSTOLIC BLOOD PRESSURE: 128 MMHG | WEIGHT: 276.4 LBS | HEIGHT: 68 IN

## 2019-10-29 DIAGNOSIS — E11.9 TYPE 2 DIABETES MELLITUS WITHOUT COMPLICATION, WITHOUT LONG-TERM CURRENT USE OF INSULIN (HCC): ICD-10-CM

## 2019-10-29 DIAGNOSIS — Z23 ENCOUNTER FOR IMMUNIZATION: ICD-10-CM

## 2019-10-29 DIAGNOSIS — M77.8 LEFT SHOULDER TENDONITIS: ICD-10-CM

## 2019-10-29 DIAGNOSIS — M54.50 CHRONIC RIGHT-SIDED LOW BACK PAIN WITHOUT SCIATICA: Primary | ICD-10-CM

## 2019-10-29 DIAGNOSIS — G89.29 CHRONIC RIGHT-SIDED LOW BACK PAIN WITHOUT SCIATICA: Primary | ICD-10-CM

## 2019-10-29 LAB
CREAT UR-MCNC: 113 MG/DL
MICROALBUMIN UR-MCNC: 6.9 MG/L (ref 0–20)
MICROALBUMIN/CREAT 24H UR: 6 MG/G CREATININE (ref 0–30)
SL AMB POCT HEMOGLOBIN AIC: 6.9 (ref ?–6.5)

## 2019-10-29 PROCEDURE — 82043 UR ALBUMIN QUANTITATIVE: CPT | Performed by: FAMILY MEDICINE

## 2019-10-29 PROCEDURE — 99214 OFFICE O/P EST MOD 30 MIN: CPT | Performed by: FAMILY MEDICINE

## 2019-10-29 PROCEDURE — 3008F BODY MASS INDEX DOCD: CPT | Performed by: FAMILY MEDICINE

## 2019-10-29 PROCEDURE — 82570 ASSAY OF URINE CREATININE: CPT | Performed by: FAMILY MEDICINE

## 2019-10-29 PROCEDURE — 83036 HEMOGLOBIN GLYCOSYLATED A1C: CPT | Performed by: FAMILY MEDICINE

## 2019-10-29 PROCEDURE — 90682 RIV4 VACC RECOMBINANT DNA IM: CPT | Performed by: FAMILY MEDICINE

## 2019-10-29 PROCEDURE — 90471 IMMUNIZATION ADMIN: CPT | Performed by: FAMILY MEDICINE

## 2019-10-29 RX ORDER — MELATONIN
50000 WEEKLY
COMMUNITY
End: 2020-09-11 | Stop reason: HOSPADM

## 2019-10-29 RX ORDER — NAPROXEN 500 MG/1
500 TABLET ORAL 2 TIMES DAILY PRN
Qty: 30 TABLET | Refills: 0 | Status: SHIPPED | OUTPATIENT
Start: 2019-10-29 | End: 2019-12-16 | Stop reason: SDUPTHER

## 2019-10-29 NOTE — PROGRESS NOTES
Assessment/Plan:  Reviewed blood work and imaging done in the ER  Kidney function and imaging were normal   I believe patient is just suffering from a muscle strain on the right low back  We will start him in physical therapy  Flu shot given today  We will check his hemoglobin A1c and microalbumin  We will see him back in 3 months or p r n  Problem List Items Addressed This Visit        Endocrine    Type 2 diabetes mellitus without complication, without long-term current use of insulin ([facility] )    Relevant Orders    POCT hemoglobin A1c    Microalbumin / creatinine urine ratio      Other Visit Diagnoses     Chronic right-sided low back pain without sciatica    -  Primary    Relevant Orders    Ambulatory referral to Physical Therapy    Left shoulder tendonitis        Relevant Medications    naproxen (NAPROSYN) 500 mg tablet    Encounter for immunization        Relevant Orders    influenza vaccine, [id number], quadrivalent, recombinant, PF, 0 5 mL, for patients 18 yr+ (FLUBLOK)           Diagnoses and all orders for this visit:    Chronic right-sided low back pain without sciatica  -     Cancel: Ambulatory referral to Physical Therapy; Future  -     Ambulatory referral to Physical Therapy; Future    Left shoulder tendonitis  -     naproxen (NAPROSYN) 500 mg tablet; Take 1 tablet (500 mg total) by mouth 2 (two) times a day as needed for moderate pain Take with food    Encounter for immunization  -     influenza vaccine, [id number], quadrivalent, recombinant, PF, 0 5 mL, for patients 18 yr+ (FLUBLOK)    Type 2 diabetes mellitus without complication, without long-term current use of insulin ([facility])  -     POCT hemoglobin A1c  -     Microalbumin / creatinine urine ratio    Other orders  -     cholecalciferol (VITAMIN D3) 1,000 units tablet; Take 50,000 Units by mouth once a week        No problem-specific Assessment & Plan notes found for this encounter  Subjective:      Patient ID: Joseph Yun is a 40 y  o  male     Patient here today follow-up from the ER  Patient continues to have right sided low back pain  Imaging and lab work in the ER were normal   Urinalysis was normal except showing sugar  Patient denies any fever chills  Back Pain   This is a chronic problem  The problem occurs constantly  The problem is unchanged  The pain is present in the lumbar spine  The quality of the pain is described as aching  The pain does not radiate  The pain is moderate  The pain is the same all the time  The symptoms are aggravated by position, bending and twisting  Stiffness is present all day  Pertinent negatives include no bladder incontinence, bowel incontinence, leg pain, perianal numbness or weakness  He has tried nothing for the symptoms  The following portions of the patient's history were reviewed and updated as appropriate:   He has a past medical history of Allergic rhinitis, Asthma, Bipolar 1 disorder ([facility] [address] ), Chronic kidney disease, Diabetes mellitus ([facility] [address] ), Hematuria, Hyperlipidemia, Leukocytosis, Diana-Hussein tear, Pulmonary emboli ([facility] [address] ), Seizures ([facility] [address] ), and Sleep apnea  ,  does not have any pertinent problems on file  ,   has a past surgical history that includes Nasal septum surgery; Angioplasty; and Cardiac surgery  ,  family history includes Asthma in his father; Cancer in his mother and paternal grandfather; Colon cancer in his family; Diabetes in his mother; Hypertension in his mother  ,   reports that he has never smoked  His smokeless tobacco use includes chew  He reports that he does not drink alcohol or use drugs  ,  is allergic to epinephrine; iodine; other; and shellfish-derived products     Current Outpatient Medications   Medication Sig Dispense Refill    atorvastatin (LIPITOR) 10 mg tablet take 1 tablet by mouth once daily 30 tablet 5    cholecalciferol (VITAMIN D3) 1,000 units tablet Take 50,000 Units by mouth once a week      divalproex sodium (DEPAKOTE) 500 mg EC tablet Take 500 mg by mouth 2 (two) times a day   lisinopril (ZESTRIL) 10 mg tablet take 1 tablet by mouth once daily 30 tablet 5    metFORMIN (GLUCOPHAGE) 1000 MG tablet take 1 tablet by mouth twice a day 60 tablet 5    risperiDONE (RisperDAL) 2 mg tablet Take 1 tablet (2 mg total) by mouth 2 (two) times a day 30 tablet 0    aspirin 81 mg chewable tablet Chew 81 mg daily      JANUVIA 100 MG tablet take 1 tablet by mouth once daily (Patient not taking: Reported on 10/26/2019) 30 tablet 5    magnesium oxide (MAG-OX) 400 mg Take 1 tablet (400 mg total) by mouth 2 (two) times a day (Patient not taking: Reported on 10/29/2019) 60 tablet 0    naproxen (NAPROSYN) 500 mg tablet Take 1 tablet (500 mg total) by mouth 2 (two) times a day as needed for moderate pain Take with food 30 tablet 0     No current facility-administered medications for this visit  Review of Systems   Constitutional: Negative  Respiratory: Negative  Cardiovascular: Negative  Gastrointestinal: Negative  Negative for bowel incontinence  Genitourinary: Negative  Negative for bladder incontinence  Musculoskeletal: Positive for back pain  Neurological: Negative for weakness  Objective:  Vitals:    10/29/19 0832   BP: 128/80   Weight: 125 kg (276 lb 6 4 oz)   Height: 5' 8" (1 727 m)     Body mass index is 42 03 kg/m²  Physical Exam   Constitutional: He is oriented to person, place, and time  He appears well-developed and well-nourished  No distress  HENT:   Head: Normocephalic and atraumatic  Eyes: Conjunctivae are normal    Cardiovascular: Normal rate, regular rhythm and normal heart sounds  Exam reveals no gallop and no friction rub  No murmur heard  Pulmonary/Chest: Effort normal and breath sounds normal  No respiratory distress  He has no wheezes  He has no rales  Musculoskeletal: He exhibits tenderness (Right-sided low back pain )  He exhibits no edema  Neurological: He is alert and oriented to person, place, and time  Skin: He is not diaphoretic  Psychiatric: He has a normal mood and affect  His behavior is normal  Judgment and thought content normal    Vitals reviewed

## 2019-11-08 ENCOUNTER — TELEPHONE (OUTPATIENT)
Dept: FAMILY MEDICINE CLINIC | Facility: CLINIC | Age: 44
End: 2019-11-08

## 2019-11-08 NOTE — TELEPHONE ENCOUNTER
PT CAME IN TO GET BLOOD WORK DONE AND NOTHING WAS IN THE COMPUTER  HE SAID HE WAS TO HAVE 6 TUBES DRAWN AND THEY COULD NOT GET IT  HE WANTED TO GET THE REST OF IT DONE TODAY  DO YOU KNOW WHAT THEY WANTED? CAN YOU ORDER?

## 2019-11-17 ENCOUNTER — APPOINTMENT (EMERGENCY)
Dept: RADIOLOGY | Facility: HOSPITAL | Age: 44
End: 2019-11-17
Payer: COMMERCIAL

## 2019-11-17 ENCOUNTER — HOSPITAL ENCOUNTER (EMERGENCY)
Facility: HOSPITAL | Age: 44
Discharge: HOME | End: 2019-11-17
Attending: EMERGENCY MEDICINE | Admitting: EMERGENCY MEDICINE
Payer: COMMERCIAL

## 2019-11-17 VITALS
DIASTOLIC BLOOD PRESSURE: 74 MMHG | TEMPERATURE: 96.9 F | OXYGEN SATURATION: 97 % | BODY MASS INDEX: 42.03 KG/M2 | HEIGHT: 68 IN | SYSTOLIC BLOOD PRESSURE: 163 MMHG | WEIGHT: 277.34 LBS | HEART RATE: 85 BPM | RESPIRATION RATE: 18 BRPM

## 2019-11-17 DIAGNOSIS — M25.532 ACUTE PAIN OF LEFT WRIST: Primary | ICD-10-CM

## 2019-11-17 PROCEDURE — 73110 X-RAY EXAM OF WRIST: CPT

## 2019-11-17 PROCEDURE — 99283 EMERGENCY DEPT VISIT LOW MDM: CPT

## 2019-11-17 PROCEDURE — 99284 EMERGENCY DEPT VISIT MOD MDM: CPT | Performed by: EMERGENCY MEDICINE

## 2019-11-17 RX ORDER — NAPROXEN 500 MG/1
500 TABLET ORAL 2 TIMES DAILY WITH MEALS
Qty: 10 TABLET | Refills: 0 | Status: SHIPPED | OUTPATIENT
Start: 2019-11-17 | End: 2019-12-06 | Stop reason: SDUPTHER

## 2019-11-17 RX ORDER — NAPROXEN 500 MG/1
500 TABLET ORAL ONCE
Status: COMPLETED | OUTPATIENT
Start: 2019-11-17 | End: 2019-11-17

## 2019-11-17 RX ADMIN — NAPROXEN 500 MG: 500 TABLET ORAL at 06:34

## 2019-11-17 NOTE — ED PROVIDER NOTES
History  Chief Complaint   Patient presents with    Wrist Pain     left wrist pain since monday  Was placed in handcuff adn they were too tight  HPI     Pt presents from home c/o left wrist pain and swelling after he was handcuffed 5 days ago, and he states that the cuffs were applied too tight  Pt states that he has had swelling and pain to his left wrist   No other injuries  He has been taking naproxen for the pain with some improvement  Prior to Admission Medications   Prescriptions Last Dose Informant Patient Reported? Taking? JANUVIA 100 MG tablet Not Taking at Unknown time  No No   Sig: take 1 tablet by mouth once daily   Patient not taking: Reported on 10/26/2019   aspirin 81 mg chewable tablet Not Taking at Unknown time  Yes No   Sig: Chew 81 mg daily   atorvastatin (LIPITOR) 10 mg tablet 11/16/2019 at Unknown time  No Yes   Sig: take 1 tablet by mouth once daily   cholecalciferol (VITAMIN D3) 1,000 units tablet Past Week at Unknown time Care Giver Yes Yes   Sig: Take 50,000 Units by mouth once a week   divalproex sodium (DEPAKOTE) 500 mg EC tablet 11/16/2019 at Unknown time  Yes Yes   Sig: Take 500 mg by mouth 2 (two) times a day     lisinopril (ZESTRIL) 10 mg tablet 11/16/2019 at Unknown time  No Yes   Sig: take 1 tablet by mouth once daily   magnesium oxide (MAG-OX) 400 mg Not Taking at Unknown time  No No   Sig: Take 1 tablet (400 mg total) by mouth 2 (two) times a day   Patient not taking: Reported on 10/29/2019   metFORMIN (GLUCOPHAGE) 1000 MG tablet 11/16/2019 at Unknown time  No Yes   Sig: take 1 tablet by mouth twice a day   naproxen (NAPROSYN) 500 mg tablet 11/16/2019 at Unknown time  No Yes   Sig: Take 1 tablet (500 mg total) by mouth 2 (two) times a day as needed for moderate pain Take with food   risperiDONE (RisperDAL) 2 mg tablet 11/16/2019 at Unknown time  No Yes   Sig: Take 1 tablet (2 mg total) by mouth 2 (two) times a day      Facility-Administered Medications: None       Past Medical History:   Diagnosis Date    Allergic rhinitis     resolved 6/20/17    Asthma     resolved 6/20/17    [facility] )     resolved 6/20/17    Chronic kidney disease     Diabetes mellitus ([facility] )     Hematuria     resolved 6/20/17    Hyperlipidemia     Leukocytosis     resolved 6/20/17    Diana-Hussein tear     Pulmonary emboli (HCC)     resolved 6/20/17    Seizures ([facility] )     Sleep apnea     resolved 6/20/17       Past Surgical History:   Procedure Laterality Date    ANGIOPLASTY      previous stent placement    CARDIAC SURGERY      NASAL SEPTUM SURGERY         Family History   Problem Relation Age of Onset    Cancer Mother     Diabetes Mother     Hypertension Mother     Asthma Father     Cancer Paternal Grandfather     Colon cancer Family      I have reviewed and agree with the history as documented  Social History     Tobacco Use    Smoking status: Never Smoker    Smokeless tobacco: Current User     Types: Chew   Substance Use Topics    Alcohol use: Never     Alcohol/week: 0 0 standard drinks     Frequency: Never     Drinks per session: Patient refused     Binge frequency: Never     Comment: occasional as per [employer]    Drug use: No        Review of Systems   Constitutional: Negative for activity change, appetite change and fever  HENT: Negative for congestion, nosebleeds and sore throat  Eyes: Negative for photophobia and discharge  Respiratory: Negative for cough, shortness of breath, wheezing and stridor  Cardiovascular: Negative for chest pain  Gastrointestinal: Negative for abdominal pain, constipation, diarrhea, nausea and vomiting  Endocrine: Negative for cold intolerance and polydipsia  Genitourinary: Negative for discharge, hematuria and penile pain  Musculoskeletal: Positive for arthralgias and myalgias  Negative for neck stiffness  Skin: Negative for color change and rash  Allergic/Immunologic: Negative for immunocompromised state  Neurological: Negative for dizziness, seizures and headaches  Hematological: Negative for adenopathy  Psychiatric/Behavioral: Negative for confusion and self-injury  The patient is not nervous/anxious  Physical Exam  Physical Exam   Constitutional: He is oriented to person, place, and time  He appears well-developed and well-nourished  No distress  HENT:   Head: Normocephalic and atraumatic  Right Ear: External ear normal    Left Ear: External ear normal    Nose: Nose normal    Mouth/Throat: Oropharynx is clear and moist  No oropharyngeal exudate  Eyes: Pupils are equal, round, and reactive to light  Conjunctivae and EOM are normal  Right eye exhibits no discharge  Left eye exhibits no discharge  No scleral icterus  Neck: Normal range of motion  Neck supple  No JVD present  No tracheal deviation present  No thyromegaly present  Cardiovascular: Normal rate, regular rhythm, normal heart sounds and intact distal pulses  Exam reveals no gallop and no friction rub  No murmur heard  Pulmonary/Chest: Breath sounds normal  No stridor  No respiratory distress  He has no wheezes  He has no rales  He exhibits no tenderness  Abdominal: Soft  Bowel sounds are normal  He exhibits no distension and no mass  There is no tenderness  There is no rebound and no guarding  Musculoskeletal: Normal range of motion  He exhibits tenderness  He exhibits no edema or deformity  Arms:  Lymphadenopathy:     He has no cervical adenopathy  Neurological: He is alert and oriented to person, place, and time  He has normal reflexes  He displays normal reflexes  No cranial nerve deficit  He exhibits normal muscle tone  Coordination normal    Skin: Skin is warm and dry  No rash noted  He is not diaphoretic  No erythema  No pallor  Psychiatric: He has a normal mood and affect  His behavior is normal  Judgment and thought content normal    Nursing note and vitals reviewed        Vital Signs  ED Triage Vitals [11/17/19 0501]   Temperature Pulse Respirations Blood Pressure SpO2   (!) 96 9 °F (36 1 °C) 85 18 163/74 97 %      Temp Source Heart Rate Source Patient Position - Orthostatic VS BP Location FiO2 (%)   Temporal Monitor Sitting Right arm --      Pain Score       8           Vitals:    11/17/19 0501   BP: 163/74   Pulse: 85   Patient Position - Orthostatic VS: Sitting         Visual Acuity      ED Medications  Medications   naproxen (NAPROSYN) tablet 500 mg (500 mg Oral Given 11/17/19 9439)       Diagnostic Studies  Results Reviewed     None                 XR wrist 3+ views LEFT    (Results Pending)              Procedures  Procedures       ED Course                               MDM  Number of Diagnoses or Management Options  Acute pain of left wrist:   Diagnosis management comments: IMP: wrist contusion vs fx  Doubt dislocation or vascular injury    Plan: check xray of wrist, give po anti-inflammatory and ace wrap prn   - xray no acute  - Pt is improved and will f/up w/ his pcp       Amount and/or Complexity of Data Reviewed  Tests in the radiology section of CPT®: ordered and reviewed  Decide to obtain previous medical records or to obtain history from someone other than the patient: yes  Review and summarize past medical records: yes  Independent visualization of images, tracings, or specimens: yes    Risk of Complications, Morbidity, and/or Mortality  Presenting problems: high  Diagnostic procedures: low  Management options: low    Patient Progress  Patient progress: improved      Disposition  Final diagnoses:   Acute pain of left wrist     Time reflects when diagnosis was documented in both MDM as applicable and the Disposition within this note     Time User Action Codes Description Comment    11/17/2019  6:33 AM Marv Delong [[id number]] Acute pain of left wrist       ED Disposition     ED Disposition Condition Date/Time Comment    Discharge Stable Sun Nov 17, 2019  6:32 AM Mar Mckenzie discharge to home/self care  Follow-up Information     Follow up With Specialties Details Why [address], DO Family Medicine Schedule an appointment as soon as possible for a visit in 2 days As needed  Return immediately, If symptoms worsen [facility]  [phone]            Patient's Medications   Discharge Prescriptions    NAPROXEN (NAPROSYN) 500 MG TABLET    Take 1 tablet (500 mg total) by mouth 2 (two) times a day with meals for 10 doses       Start Date: 11/17/2019End Date: 11/22/2019       Order Dose: 500 mg       Quantity: 10 tablet    Refills: 0     No discharge procedures on file      ED Provider  Electronically Signed by           Carlo Bear DO  11/17/19 1145

## 2019-12-05 DIAGNOSIS — E11.9 TYPE 2 DIABETES MELLITUS WITHOUT COMPLICATION, WITHOUT LONG-TERM CURRENT USE OF INSULIN (HCC): ICD-10-CM

## 2019-12-05 RX ORDER — ATORVASTATIN CALCIUM 10 MG/1
TABLET, FILM COATED ORAL
Qty: 30 TABLET | Refills: 5 | Status: SHIPPED | OUTPATIENT
Start: 2019-12-05 | End: 2020-05-31 | Stop reason: SDUPTHER

## 2019-12-06 DIAGNOSIS — M25.532 ACUTE PAIN OF LEFT WRIST: ICD-10-CM

## 2019-12-06 RX ORDER — NAPROXEN 500 MG/1
500 TABLET ORAL 2 TIMES DAILY WITH MEALS
Qty: 10 TABLET | Refills: 0 | Status: SHIPPED | OUTPATIENT
Start: 2019-12-06 | End: 2020-02-19

## 2019-12-16 DIAGNOSIS — M77.8 LEFT SHOULDER TENDONITIS: ICD-10-CM

## 2019-12-16 RX ORDER — NAPROXEN 500 MG/1
500 TABLET ORAL 2 TIMES DAILY PRN
Qty: 30 TABLET | Refills: 0 | Status: SHIPPED | OUTPATIENT
Start: 2019-12-16 | End: 2020-09-10

## 2019-12-17 DIAGNOSIS — M77.8 LEFT SHOULDER TENDONITIS: ICD-10-CM

## 2019-12-17 RX ORDER — NAPROXEN 500 MG/1
500 TABLET ORAL 2 TIMES DAILY PRN
Qty: 30 TABLET | Refills: 0 | OUTPATIENT
Start: 2019-12-17

## 2020-01-29 ENCOUNTER — OFFICE VISIT (OUTPATIENT)
Dept: FAMILY MEDICINE CLINIC | Facility: CLINIC | Age: 45
End: 2020-01-29
Payer: COMMERCIAL

## 2020-01-29 ENCOUNTER — TELEPHONE (OUTPATIENT)
Dept: FAMILY MEDICINE CLINIC | Facility: CLINIC | Age: 45
End: 2020-01-29

## 2020-01-29 VITALS
WEIGHT: 266 LBS | HEIGHT: 68 IN | DIASTOLIC BLOOD PRESSURE: 78 MMHG | BODY MASS INDEX: 40.32 KG/M2 | SYSTOLIC BLOOD PRESSURE: 118 MMHG

## 2020-01-29 DIAGNOSIS — M54.42 CHRONIC LEFT-SIDED LOW BACK PAIN WITH LEFT-SIDED SCIATICA: Primary | ICD-10-CM

## 2020-01-29 DIAGNOSIS — G89.29 CHRONIC LEFT-SIDED LOW BACK PAIN WITH LEFT-SIDED SCIATICA: Primary | ICD-10-CM

## 2020-01-29 DIAGNOSIS — R07.9 CHEST PAIN WITH MODERATE RISK FOR CARDIAC ETIOLOGY: Primary | ICD-10-CM

## 2020-01-29 DIAGNOSIS — Z86.711 HISTORY OF PULMONARY EMBOLUS (PE): ICD-10-CM

## 2020-01-29 DIAGNOSIS — R07.9 CHEST PAIN WITH MODERATE RISK FOR CARDIAC ETIOLOGY: ICD-10-CM

## 2020-01-29 PROCEDURE — 3008F BODY MASS INDEX DOCD: CPT | Performed by: FAMILY MEDICINE

## 2020-01-29 PROCEDURE — 99214 OFFICE O/P EST MOD 30 MIN: CPT | Performed by: FAMILY MEDICINE

## 2020-01-29 RX ORDER — METHOCARBAMOL 500 MG/1
500 TABLET, FILM COATED ORAL 3 TIMES DAILY PRN
Qty: 30 TABLET | Refills: 0 | Status: SHIPPED | OUTPATIENT
Start: 2020-01-29 | End: 2020-09-10

## 2020-01-29 NOTE — TELEPHONE ENCOUNTER
----- Message from Pk How sent at 1/29/2020 10:28 AM EST -----  PT WILL NEED CXR FOR THE NM LUNG STUDY I WILL LET PT KNOW TO GET IT DONE
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
## 2020-02-04 ENCOUNTER — HOSPITAL ENCOUNTER (OUTPATIENT)
Dept: NON INVASIVE DIAGNOSTICS | Facility: HOSPITAL | Age: 45
Discharge: HOME | End: 2020-02-04
Payer: COMMERCIAL

## 2020-02-04 ENCOUNTER — HOSPITAL ENCOUNTER (OUTPATIENT)
Dept: RADIOLOGY | Facility: HOSPITAL | Age: 45
Discharge: HOME | End: 2020-02-04
Payer: COMMERCIAL

## 2020-02-04 ENCOUNTER — HOSPITAL ENCOUNTER (OUTPATIENT)
Dept: NUCLEAR MEDICINE | Facility: HOSPITAL | Age: 45
Discharge: HOME | End: 2020-02-04
Payer: COMMERCIAL

## 2020-02-04 DIAGNOSIS — Z86.711 HISTORY OF PULMONARY EMBOLUS (PE): ICD-10-CM

## 2020-02-04 DIAGNOSIS — R07.9 CHEST PAIN WITH MODERATE RISK FOR CARDIAC ETIOLOGY: ICD-10-CM

## 2020-02-04 PROCEDURE — 93016 CV STRESS TEST SUPVJ ONLY: CPT | Performed by: INTERNAL MEDICINE

## 2020-02-04 PROCEDURE — 93017 CV STRESS TEST TRACING ONLY: CPT

## 2020-02-04 PROCEDURE — A9539 TC99M PENTETATE: HCPCS

## 2020-02-04 PROCEDURE — 71046 X-RAY EXAM CHEST 2 VIEWS: CPT

## 2020-02-04 PROCEDURE — 93018 CV STRESS TEST I&R ONLY: CPT | Performed by: INTERNAL MEDICINE

## 2020-02-04 PROCEDURE — 78582 LUNG VENTILAT&PERFUS IMAGING: CPT

## 2020-02-04 PROCEDURE — A9540 TC99M MAA: HCPCS

## 2020-02-05 ENCOUNTER — EVALUATION (OUTPATIENT)
Dept: PHYSICAL THERAPY | Facility: CLINIC | Age: 45
End: 2020-02-05
Payer: COMMERCIAL

## 2020-02-05 DIAGNOSIS — G89.29 CHRONIC LEFT-SIDED LOW BACK PAIN WITH LEFT-SIDED SCIATICA: Primary | ICD-10-CM

## 2020-02-05 DIAGNOSIS — M54.42 CHRONIC LEFT-SIDED LOW BACK PAIN WITH LEFT-SIDED SCIATICA: Primary | ICD-10-CM

## 2020-02-05 PROCEDURE — 97112 NEUROMUSCULAR REEDUCATION: CPT | Performed by: PHYSICAL THERAPIST

## 2020-02-05 PROCEDURE — 97110 THERAPEUTIC EXERCISES: CPT | Performed by: PHYSICAL THERAPIST

## 2020-02-05 PROCEDURE — 97162 PT EVAL MOD COMPLEX 30 MIN: CPT | Performed by: PHYSICAL THERAPIST

## 2020-02-07 ENCOUNTER — APPOINTMENT (OUTPATIENT)
Dept: PHYSICAL THERAPY | Facility: CLINIC | Age: 45
End: 2020-02-07
Payer: COMMERCIAL

## 2020-02-11 ENCOUNTER — TELEPHONE (OUTPATIENT)
Dept: FAMILY MEDICINE CLINIC | Facility: CLINIC | Age: 45
End: 2020-02-11

## 2020-02-11 ENCOUNTER — APPOINTMENT (OUTPATIENT)
Dept: PHYSICAL THERAPY | Facility: CLINIC | Age: 45
End: 2020-02-11
Payer: COMMERCIAL

## 2020-02-11 DIAGNOSIS — G89.29 CHRONIC LEFT-SIDED LOW BACK PAIN WITH LEFT-SIDED SCIATICA: Primary | ICD-10-CM

## 2020-02-11 DIAGNOSIS — M54.42 CHRONIC LEFT-SIDED LOW BACK PAIN WITH LEFT-SIDED SCIATICA: Primary | ICD-10-CM

## 2020-02-11 RX ORDER — GABAPENTIN 300 MG/1
CAPSULE ORAL
Qty: 90 CAPSULE | Refills: 0 | Status: SHIPPED | OUTPATIENT
Start: 2020-02-11 | End: 2020-02-27

## 2020-02-11 NOTE — TELEPHONE ENCOUNTER
Patient c/o increased pain  Per patient's wife; patient is not sleeping, crying & yelling in pain  States he is not getting any relief from naproxen, tylenol, ice or heat  Teodora also reports patient has been discharged from PT, as the physical therapist feels it is not beneficial to patient

## 2020-02-11 NOTE — TELEPHONE ENCOUNTER
We will get a back x-ray and refer to Pain Management  We will start him on gabapentin    He will taper up to 3 times a day, as per directions on prescription

## 2020-02-12 ENCOUNTER — APPOINTMENT (OUTPATIENT)
Dept: RADIOLOGY | Facility: MEDICAL CENTER | Age: 45
End: 2020-02-12
Payer: COMMERCIAL

## 2020-02-12 DIAGNOSIS — M54.42 CHRONIC LEFT-SIDED LOW BACK PAIN WITH LEFT-SIDED SCIATICA: ICD-10-CM

## 2020-02-12 DIAGNOSIS — G89.29 CHRONIC LEFT-SIDED LOW BACK PAIN WITH LEFT-SIDED SCIATICA: ICD-10-CM

## 2020-02-12 LAB
CHEST PAIN STATEMENT: NORMAL
MAX DIASTOLIC BP: 88 MMHG
MAX HEART RATE: 150 BPM
MAX PREDICTED HEART RATE: 176 BPM
MAX. SYSTOLIC BP: 140 MMHG
PROTOCOL NAME: NORMAL
REASON FOR TERMINATION: NORMAL
TARGET HR FORMULA: NORMAL
TEST INDICATION: NORMAL
TIME IN EXERCISE PHASE: NORMAL

## 2020-02-12 PROCEDURE — 72110 X-RAY EXAM L-2 SPINE 4/>VWS: CPT

## 2020-02-14 ENCOUNTER — APPOINTMENT (OUTPATIENT)
Dept: PHYSICAL THERAPY | Facility: CLINIC | Age: 45
End: 2020-02-14
Payer: COMMERCIAL

## 2020-02-17 ENCOUNTER — APPOINTMENT (OUTPATIENT)
Dept: RADIOLOGY | Facility: CLINIC | Age: 45
End: 2020-02-17
Payer: COMMERCIAL

## 2020-02-17 ENCOUNTER — CONSULT (OUTPATIENT)
Dept: PAIN MEDICINE | Facility: CLINIC | Age: 45
End: 2020-02-17
Payer: COMMERCIAL

## 2020-02-17 VITALS
HEIGHT: 68 IN | BODY MASS INDEX: 40.07 KG/M2 | SYSTOLIC BLOOD PRESSURE: 126 MMHG | WEIGHT: 264.4 LBS | DIASTOLIC BLOOD PRESSURE: 72 MMHG

## 2020-02-17 DIAGNOSIS — M25.562 CHRONIC PAIN OF LEFT KNEE: ICD-10-CM

## 2020-02-17 DIAGNOSIS — G89.29 CHRONIC LEFT-SIDED LOW BACK PAIN WITH LEFT-SIDED SCIATICA: ICD-10-CM

## 2020-02-17 DIAGNOSIS — M51.16 LUMBAR DISC DISEASE WITH RADICULOPATHY: Primary | ICD-10-CM

## 2020-02-17 DIAGNOSIS — G89.29 CHRONIC PAIN OF LEFT KNEE: ICD-10-CM

## 2020-02-17 DIAGNOSIS — M54.42 CHRONIC LEFT-SIDED LOW BACK PAIN WITH LEFT-SIDED SCIATICA: ICD-10-CM

## 2020-02-17 DIAGNOSIS — M25.552 LEFT HIP PAIN: ICD-10-CM

## 2020-02-17 PROCEDURE — 73562 X-RAY EXAM OF KNEE 3: CPT

## 2020-02-17 PROCEDURE — 73502 X-RAY EXAM HIP UNI 2-3 VIEWS: CPT

## 2020-02-17 PROCEDURE — 99204 OFFICE O/P NEW MOD 45 MIN: CPT | Performed by: ANESTHESIOLOGY

## 2020-02-17 PROCEDURE — 3008F BODY MASS INDEX DOCD: CPT | Performed by: ANESTHESIOLOGY

## 2020-02-17 RX ORDER — NORTRIPTYLINE HYDROCHLORIDE 10 MG/1
10 CAPSULE ORAL
Qty: 30 CAPSULE | Refills: 1 | Status: SHIPPED | OUTPATIENT
Start: 2020-02-17 | End: 2020-02-27
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
## 2020-02-18 ENCOUNTER — APPOINTMENT (OUTPATIENT)
Dept: PHYSICAL THERAPY | Facility: CLINIC | Age: 45
End: 2020-02-18
Payer: COMMERCIAL

## 2020-02-19 ENCOUNTER — OFFICE VISIT (OUTPATIENT)
Dept: FAMILY MEDICINE CLINIC | Facility: CLINIC | Age: 45
End: 2020-02-19
Payer: COMMERCIAL

## 2020-02-19 VITALS
BODY MASS INDEX: 40.01 KG/M2 | SYSTOLIC BLOOD PRESSURE: 114 MMHG | DIASTOLIC BLOOD PRESSURE: 80 MMHG | WEIGHT: 264 LBS | HEIGHT: 68 IN

## 2020-02-19 DIAGNOSIS — M54.42 CHRONIC LEFT-SIDED LOW BACK PAIN WITH LEFT-SIDED SCIATICA: Primary | ICD-10-CM

## 2020-02-19 DIAGNOSIS — M51.16 LUMBAR DISC DISEASE WITH RADICULOPATHY: ICD-10-CM

## 2020-02-19 DIAGNOSIS — G89.29 CHRONIC LEFT-SIDED LOW BACK PAIN WITH LEFT-SIDED SCIATICA: Primary | ICD-10-CM

## 2020-02-19 PROCEDURE — 3008F BODY MASS INDEX DOCD: CPT | Performed by: FAMILY MEDICINE

## 2020-02-19 PROCEDURE — 99213 OFFICE O/P EST LOW 20 MIN: CPT | Performed by: FAMILY MEDICINE

## 2020-02-19 NOTE — PROGRESS NOTES
Assessment/Plan:  Told patient to try the nortriptyline at night  Continue the gabapentin and the Robaxin p r n     Follow up with pain management next month  Follow-up here in 2 months or p r n  Problem List Items Addressed This Visit        Nervous and Auditory    Chronic left-sided low back pain with left-sided sciatica - Primary    Lumbar disc disease with radiculopathy           Diagnoses and all orders for this visit:    Chronic left-sided low back pain with left-sided sciatica    Lumbar disc disease with radiculopathy        No problem-specific Assessment & Plan notes found for this encounter  Subjective:      Patient ID: Anisa Vicente is a 40 y o  male  Patient here today for follow-up  Patient saw pain management, wanted him to try nortriptyline at bedtime  Patient has not started it yet  X-rays they obtained were normal   Patient's stress test and lung study were normal     Back Pain   This is a chronic problem  The problem occurs constantly  The problem is unchanged  The pain is present in the lumbar spine  The quality of the pain is described as aching, burning and shooting  The pain radiates to the left thigh and right thigh  The pain is moderate  The pain is the same all the time  The symptoms are aggravated by bending, position and twisting  Stiffness is present all day  Associated symptoms include leg pain  Pertinent negatives include no bladder incontinence, bowel incontinence or weakness  He has tried muscle relaxant (Gabapentin) for the symptoms  The treatment provided mild relief  The following portions of the patient's history were reviewed and updated as appropriate:   He has a past medical history of Allergic rhinitis, Asthma, Bipolar 1 disorder ([address] ), Chronic kidney disease, Diabetes mellitus ([facility] [address] ), Hematuria, Hyperlipidemia, Leukocytosis, Diana-Hussein tear, Pulmonary emboli ([facility] [address] ), Seizures ([facility] [address] ), and Sleep apnea  ,  does not have any pertinent problems on file  ,   has a past surgical history that includes Nasal septum surgery; Angioplasty; and Cardiac surgery  ,  family history includes Asthma in his father; Cancer in his mother and paternal grandfather; Colon cancer in his family; Diabetes in his mother; Hypertension in his mother  ,   reports that he has never smoked  His smokeless tobacco use includes chew  He reports that he does not drink alcohol or use drugs  ,  is allergic to epinephrine; iodine; other; and shellfish-derived products     Current Outpatient Medications   Medication Sig Dispense Refill    aspirin 81 mg chewable tablet Chew 81 mg daily      atorvastatin (LIPITOR) 10 mg tablet take 1 tablet by mouth daily 30 tablet 5    cholecalciferol (VITAMIN D3) 1,000 units tablet Take 50,000 Units by mouth once a week      divalproex sodium (DEPAKOTE) 500 mg EC tablet Take 500 mg by mouth 2 (two) times a day   gabapentin (NEURONTIN) 300 mg capsule 1 capsule at bedtime for 2 days, then 1 capsule twice a day for 2 days, then 1 capsule 3 times a day  90 capsule 0    lisinopril (ZESTRIL) 10 mg tablet take 1 tablet by mouth once daily 30 tablet 5    metFORMIN (GLUCOPHAGE) 1000 MG tablet take 1 tablet by mouth twice a day 60 tablet 5    methocarbamol (ROBAXIN) 500 mg tablet Take 1 tablet (500 mg total) by mouth 3 (three) times a day as needed for muscle spasms 30 tablet 0    nortriptyline (PAMELOR) 10 mg capsule Take 1 capsule (10 mg total) by mouth daily at bedtime 30 capsule 1    risperiDONE (RisperDAL) 2 mg tablet Take 1 tablet (2 mg total) by mouth 2 (two) times a day 30 tablet 0    naproxen (NAPROSYN) 500 mg tablet Take 1 tablet (500 mg total) by mouth 2 (two) times a day as needed for moderate pain Take with food (Patient not taking: Reported on 2/17/2020) 30 tablet 0     No current facility-administered medications for this visit  Review of Systems   Constitutional: Negative  Respiratory: Negative  Cardiovascular: Negative      Gastrointestinal: Negative  Negative for bowel incontinence  Genitourinary: Negative  Negative for bladder incontinence  Musculoskeletal: Positive for back pain  Neurological: Negative for weakness  Objective:  Vitals:    02/19/20 1102   BP: 114/80   Weight: 120 kg (264 lb)   Height: 5' 8" (1 727 m)     Body mass index is 40 14 kg/m²  Physical Exam   Constitutional: He is oriented to person, place, and time  He appears well-developed and well-nourished  No distress  HENT:   Head: Normocephalic and atraumatic  Eyes: Conjunctivae are normal    Musculoskeletal: He exhibits tenderness (Low back)  Neurological: He is alert and oriented to person, place, and time  Skin: He is not diaphoretic  Psychiatric: He has a normal mood and affect  His behavior is normal  Judgment and thought content normal    Vitals reviewed

## 2020-02-26 ENCOUNTER — TELEPHONE (OUTPATIENT)
Dept: RADIOLOGY | Facility: CLINIC | Age: 45
End: 2020-02-26

## 2020-02-26 NOTE — TELEPHONE ENCOUNTER
We will revisit at [facility], they provided pt with a home exercise regimen   Please have pt bring that HEP to the [facility]

## 2020-02-26 NOTE — TELEPHONE ENCOUNTER
----- Message from Mayi Wallace sent at 2/26/2020  9:24 AM EST -----  Regarding: PEER REVIEW  Good morning Jyotsna Loza for the MRI lumbar spine is pending denial through his insurance  They are offering a Peer Review at [phone]; Tracking #[id number]  His appointment is this Monday 3/2/2020  Please contact for plan of care  I've also copied the notification for you reference below  Thank you,  [facility]  ______________________________________________________    Unless otherwise indicated, doctor's notes that say you did sex weeks of back stretches (physical therapy, chiropractic treatments, or medically directed home exercise program) in the last six months    We also need to know you did not get better should be provided prior to an approval

## 2020-02-26 NOTE — TELEPHONE ENCOUNTER
S/W pt and advised of below  Pt states he "threw that paper the hell out "  Advised that RM requested this at [facility]  Pt states 'this is getting frustrating!"  Pt will contact PT and have it faxed to the office  Pt would also like RM to know that the Nortriptyline that he has been taking is not working  Any further recommendations?

## 2020-02-27 DIAGNOSIS — M54.42 CHRONIC LEFT-SIDED LOW BACK PAIN WITH LEFT-SIDED SCIATICA: ICD-10-CM

## 2020-02-27 DIAGNOSIS — G89.29 CHRONIC LEFT-SIDED LOW BACK PAIN WITH LEFT-SIDED SCIATICA: ICD-10-CM

## 2020-02-27 RX ORDER — GABAPENTIN 400 MG/1
400 CAPSULE ORAL 3 TIMES DAILY
Qty: 90 CAPSULE | Refills: 0 | Status: SHIPPED | OUTPATIENT
Start: 2020-02-27 | End: 2020-09-10

## 2020-02-27 NOTE — TELEPHONE ENCOUNTER
S/w pt and advised of Dr Elsa easton  Pt verbalized understanding and was appreciative  Pt asked what he should do with his leftover nortriptyline  Pt was advised that if he would like to dispose of them, he can take them to a local police station  Pt verbalized understanding

## 2020-02-27 NOTE — TELEPHONE ENCOUNTER
Discontinue nortriptyline, I titrated gabapentin to 400mg po tid    Please mail a copy of lumbar core strengthening exercises and scan home exercise regimen into pt's chart

## 2020-03-12 DIAGNOSIS — E11.9 TYPE 2 DIABETES MELLITUS WITHOUT COMPLICATION, WITHOUT LONG-TERM CURRENT USE OF INSULIN (HCC): ICD-10-CM

## 2020-03-12 PROCEDURE — 4010F ACE/ARB THERAPY RXD/TAKEN: CPT | Performed by: FAMILY MEDICINE

## 2020-03-12 RX ORDER — LISINOPRIL 10 MG/1
TABLET ORAL
Qty: 30 TABLET | Refills: 5 | Status: SHIPPED | OUTPATIENT
Start: 2020-03-12 | End: 2020-09-03

## 2020-03-16 ENCOUNTER — TELEPHONE (OUTPATIENT)
Dept: PAIN MEDICINE | Facility: CLINIC | Age: 45
End: 2020-03-16

## 2020-05-31 DIAGNOSIS — E11.9 TYPE 2 DIABETES MELLITUS WITHOUT COMPLICATION, WITHOUT LONG-TERM CURRENT USE OF INSULIN (HCC): ICD-10-CM

## 2020-05-31 RX ORDER — ATORVASTATIN CALCIUM 10 MG/1
TABLET, FILM COATED ORAL
Qty: 30 TABLET | Refills: 5 | Status: SHIPPED | OUTPATIENT
Start: 2020-05-31 | End: 2020-11-29 | Stop reason: SDUPTHER

## 2020-06-03 DIAGNOSIS — E11.9 TYPE 2 DIABETES MELLITUS WITHOUT COMPLICATION, WITHOUT LONG-TERM CURRENT USE OF INSULIN (HCC): ICD-10-CM

## 2020-08-10 ENCOUNTER — OFFICE VISIT (OUTPATIENT)
Dept: FAMILY MEDICINE CLINIC | Facility: CLINIC | Age: 45
End: 2020-08-10
Payer: COMMERCIAL

## 2020-08-10 ENCOUNTER — TRANSCRIBE ORDERS (OUTPATIENT)
Dept: ADMINISTRATIVE | Facility: HOSPITAL | Age: 45
End: 2020-08-10

## 2020-08-10 ENCOUNTER — APPOINTMENT (OUTPATIENT)
Dept: LAB | Facility: MEDICAL CENTER | Age: 45
End: 2020-08-10
Payer: COMMERCIAL

## 2020-08-10 VITALS
WEIGHT: 274.4 LBS | SYSTOLIC BLOOD PRESSURE: 122 MMHG | BODY MASS INDEX: 41.59 KG/M2 | HEIGHT: 68 IN | DIASTOLIC BLOOD PRESSURE: 82 MMHG | TEMPERATURE: 97.9 F

## 2020-08-10 DIAGNOSIS — M25.562 CHRONIC PAIN OF LEFT KNEE: Primary | ICD-10-CM

## 2020-08-10 DIAGNOSIS — E66.01 MORBID OBESITY WITH BMI OF 40.0-44.9, ADULT (HCC): ICD-10-CM

## 2020-08-10 DIAGNOSIS — E11.9 TYPE 2 DIABETES MELLITUS WITHOUT COMPLICATION, WITHOUT LONG-TERM CURRENT USE OF INSULIN (HCC): ICD-10-CM

## 2020-08-10 DIAGNOSIS — G89.29 CHRONIC PAIN OF LEFT KNEE: Primary | ICD-10-CM

## 2020-08-10 LAB
ALBUMIN SERPL BCP-MCNC: 3.5 G/DL (ref 3.5–5)
ALP SERPL-CCNC: 54 U/L (ref 46–116)
ALT SERPL W P-5'-P-CCNC: 45 U/L (ref 12–78)
ANION GAP SERPL CALCULATED.3IONS-SCNC: 8 MMOL/L (ref 4–13)
AST SERPL W P-5'-P-CCNC: 17 U/L (ref 5–45)
BASOPHILS # BLD AUTO: 0.06 THOUSANDS/ΜL (ref 0–0.1)
BASOPHILS NFR BLD AUTO: 1 % (ref 0–1)
BILIRUB SERPL-MCNC: 0.55 MG/DL (ref 0.2–1)
BUN SERPL-MCNC: 16 MG/DL (ref 5–25)
CALCIUM SERPL-MCNC: 9.7 MG/DL (ref 8.3–10.1)
CHLORIDE SERPL-SCNC: 101 MMOL/L (ref 100–108)
CHOLEST SERPL-MCNC: 148 MG/DL (ref 50–200)
CO2 SERPL-SCNC: 28 MMOL/L (ref 21–32)
CREAT SERPL-MCNC: 0.93 MG/DL (ref 0.6–1.3)
CREAT UR-MCNC: >300 MG/DL
EOSINOPHIL # BLD AUTO: 0.29 THOUSAND/ΜL (ref 0–0.61)
EOSINOPHIL NFR BLD AUTO: 3 % (ref 0–6)
ERYTHROCYTE [DISTWIDTH] IN BLOOD BY AUTOMATED COUNT: 13.5 % (ref 11.6–15.1)
EST. AVERAGE GLUCOSE BLD GHB EST-MCNC: 180 MG/DL
GFR SERPL CREATININE-BSD FRML MDRD: 99 ML/MIN/1.73SQ M
GLUCOSE P FAST SERPL-MCNC: 166 MG/DL (ref 65–99)
HBA1C MFR BLD: 7.9 %
HCT VFR BLD AUTO: 45.4 % (ref 36.5–49.3)
HDLC SERPL-MCNC: 33 MG/DL
HGB BLD-MCNC: 14.8 G/DL (ref 12–17)
IMM GRANULOCYTES # BLD AUTO: 0.06 THOUSAND/UL (ref 0–0.2)
IMM GRANULOCYTES NFR BLD AUTO: 1 % (ref 0–2)
LDLC SERPL CALC-MCNC: 69 MG/DL (ref 0–100)
LYMPHOCYTES # BLD AUTO: 3.28 THOUSANDS/ΜL (ref 0.6–4.47)
LYMPHOCYTES NFR BLD AUTO: 32 % (ref 14–44)
MCH RBC QN AUTO: 28.5 PG (ref 26.8–34.3)
MCHC RBC AUTO-ENTMCNC: 32.6 G/DL (ref 31.4–37.4)
MCV RBC AUTO: 87 FL (ref 82–98)
MICROALBUMIN UR-MCNC: 62 MG/L (ref 0–20)
MICROALBUMIN/CREAT 24H UR: <21 MG/G CREATININE (ref 0–30)
MONOCYTES # BLD AUTO: 0.56 THOUSAND/ΜL (ref 0.17–1.22)
MONOCYTES NFR BLD AUTO: 5 % (ref 4–12)
NEUTROPHILS # BLD AUTO: 6.08 THOUSANDS/ΜL (ref 1.85–7.62)
NEUTS SEG NFR BLD AUTO: 58 % (ref 43–75)
NRBC BLD AUTO-RTO: 0 /100 WBCS
PLATELET # BLD AUTO: 255 THOUSANDS/UL (ref 149–390)
PMV BLD AUTO: 9.9 FL (ref 8.9–12.7)
POTASSIUM SERPL-SCNC: 3.9 MMOL/L (ref 3.5–5.3)
PROT SERPL-MCNC: 8.1 G/DL (ref 6.4–8.2)
RBC # BLD AUTO: 5.2 MILLION/UL (ref 3.88–5.62)
SODIUM SERPL-SCNC: 137 MMOL/L (ref 136–145)
TRIGL SERPL-MCNC: 228 MG/DL
TSH SERPL DL<=0.05 MIU/L-ACNC: 2.97 UIU/ML (ref 0.36–3.74)
WBC # BLD AUTO: 10.33 THOUSAND/UL (ref 4.31–10.16)

## 2020-08-10 PROCEDURE — 3008F BODY MASS INDEX DOCD: CPT | Performed by: FAMILY MEDICINE

## 2020-08-10 PROCEDURE — 85025 COMPLETE CBC W/AUTO DIFF WBC: CPT | Performed by: FAMILY MEDICINE

## 2020-08-10 PROCEDURE — 83036 HEMOGLOBIN GLYCOSYLATED A1C: CPT | Performed by: FAMILY MEDICINE

## 2020-08-10 PROCEDURE — 80053 COMPREHEN METABOLIC PANEL: CPT | Performed by: FAMILY MEDICINE

## 2020-08-10 PROCEDURE — 99214 OFFICE O/P EST MOD 30 MIN: CPT | Performed by: FAMILY MEDICINE

## 2020-08-10 PROCEDURE — 82043 UR ALBUMIN QUANTITATIVE: CPT | Performed by: FAMILY MEDICINE

## 2020-08-10 PROCEDURE — 80061 LIPID PANEL: CPT | Performed by: FAMILY MEDICINE

## 2020-08-10 PROCEDURE — 84443 ASSAY THYROID STIM HORMONE: CPT | Performed by: FAMILY MEDICINE

## 2020-08-10 PROCEDURE — 3060F POS MICROALBUMINURIA REV: CPT | Performed by: FAMILY MEDICINE

## 2020-08-10 PROCEDURE — 3051F HG A1C>EQUAL 7.0%<8.0%: CPT | Performed by: FAMILY MEDICINE

## 2020-08-10 PROCEDURE — 36415 COLL VENOUS BLD VENIPUNCTURE: CPT | Performed by: FAMILY MEDICINE

## 2020-08-10 PROCEDURE — 82570 ASSAY OF URINE CREATININE: CPT | Performed by: FAMILY MEDICINE

## 2020-08-10 PROCEDURE — 1036F TOBACCO NON-USER: CPT | Performed by: FAMILY MEDICINE

## 2020-08-10 NOTE — PATIENT INSTRUCTIONS

## 2020-08-10 NOTE — PROGRESS NOTES
Assessment/Plan: For his left knee, we will get an ultrasound to see if he has a popliteal cyst   We will refer him to Orthopedics  Blood work ordered  Follow-up in 3 months or p r n     Problem List Items Addressed This Visit        Endocrine    Type 2 diabetes mellitus without complication, without long-term current use of insulin ([facility] [facility] )    Relevant Orders    CBC and differential    Comprehensive metabolic panel    Hemoglobin A1C    Lipid Panel with Direct LDL reflex    Microalbumin / creatinine urine ratio       Other    Chronic pain of left knee - Primary    Relevant Orders    US MSK limited    Ambulatory referral to Orthopedic Surgery      Other Visit Diagnoses     Morbid obesity with BMI of 40 0-44 9, adult ([facility] [place] [facility] )        Relevant Orders    CBC and differential    Comprehensive metabolic panel    TSH, 3rd generation with Free T4 reflex           Diagnoses and all orders for this visit:    Chronic pain of left knee  -     US MSK limited; Future  -     Ambulatory referral to Orthopedic Surgery; Future    Type 2 diabetes mellitus without complication, without long-term current use of insulin ([facility])  -     CBC and differential  -     Comprehensive metabolic panel  -     Hemoglobin A1C  -     Lipid Panel with Direct LDL reflex  -     Microalbumin / creatinine urine ratio    Morbid obesity with BMI of 40 0-44 9, adult ([facility])  -     CBC and differential  -     Comprehensive metabolic panel  -     TSH, 3rd generation with Free T4 reflex        No problem-specific Assessment & Plan notes found for this encounter  Subjective:      Patient ID: Anne Marie Amor is a 40 y o  male  Patient here today for continued left knee pain  Pain is mostly on the medial side and posterior  Patient denies any chest pain or shortness of breath  Fever chills  X-ray done in February did not show any acute findings  Patient is also due for blood work  Knee Pain    There was no injury mechanism   The pain is present in the left knee  The quality of the pain is described as aching  The pain is moderate  The pain has been constant since onset  The symptoms are aggravated by movement, palpation and weight bearing  He has tried nothing for the symptoms  Diabetes   He presents for his follow-up diabetic visit  He has type 2 diabetes mellitus  His disease course has been stable  There are no hypoglycemic associated symptoms  There are no diabetic associated symptoms  There are no hypoglycemic complications  There are no diabetic complications  Risk factors for coronary artery disease include diabetes mellitus and male sex  Current diabetic treatment includes oral agent (monotherapy)  He is compliant with treatment all of the time  His weight is stable  He is following a generally healthy diet  When asked about meal planning, he reported none  He has not had a previous visit with a dietitian  He rarely participates in exercise  There is no change in his home blood glucose trend  An ACE inhibitor/angiotensin II receptor blocker is being taken  The following portions of the patient's history were reviewed and updated as appropriate:   He has a past medical history of Allergic rhinitis, Asthma, Bipolar 1 disorder ([facility] [address] ), Chronic kidney disease, Diabetes mellitus ([facility] [address] ), Hematuria, Hyperlipidemia, Leukocytosis, Diana-Hussein tear, Pulmonary emboli ([facility] [address] ), Seizures ([facility] [address] ), and Sleep apnea  ,  does not have any pertinent problems on file  ,   has a past surgical history that includes Nasal septum surgery; Angioplasty; and Cardiac surgery  ,  family history includes Asthma in his father; Cancer in his mother and paternal grandfather; Colon cancer in his family; Diabetes in his mother; Hypertension in his mother  ,   reports that he has never smoked  His smokeless tobacco use includes chew  He reports that he does not drink alcohol or use drugs  ,  is allergic to epinephrine; iodine; other; and shellfish-derived products     Current Outpatient Medications Medication Sig Dispense Refill    aspirin 81 mg chewable tablet Chew 81 mg daily      atorvastatin (LIPITOR) 10 mg tablet take 1 tablet by mouth once daily 30 tablet 5    cholecalciferol (VITAMIN D3) 1,000 units tablet Take 50,000 Units by mouth once a week      divalproex sodium (DEPAKOTE) 500 mg EC tablet Take 500 mg by mouth 2 (two) times a day   lisinopril (ZESTRIL) 10 mg tablet take 1 tablet by mouth once daily 30 tablet 5    metFORMIN (GLUCOPHAGE) 1000 MG tablet take 1 tablet by mouth twice a day 60 tablet 5    risperiDONE (RisperDAL) 2 mg tablet Take 1 tablet (2 mg total) by mouth 2 (two) times a day 30 tablet 0    gabapentin (NEURONTIN) 400 mg capsule Take 1 capsule (400 mg total) by mouth 3 (three) times a day 1 capsule at bedtime for 2 days, then 1 capsule twice a day for 2 days, then 1 capsule 3 times a day  (Patient not taking: Reported on 8/10/2020) 90 capsule 0    methocarbamol (ROBAXIN) 500 mg tablet Take 1 tablet (500 mg total) by mouth 3 (three) times a day as needed for muscle spasms (Patient not taking: Reported on 8/10/2020) 30 tablet 0    naproxen (NAPROSYN) 500 mg tablet Take 1 tablet (500 mg total) by mouth 2 (two) times a day as needed for moderate pain Take with food (Patient not taking: Reported on 2/17/2020) 30 tablet 0     No current facility-administered medications for this visit  Review of Systems   Constitutional: Negative  Respiratory: Negative  Cardiovascular: Negative  Gastrointestinal: Negative  Genitourinary: Negative  Musculoskeletal: Positive for arthralgias (Left knee)  Objective:  Vitals:    08/10/20 0927   BP: 122/82   Temp: 97 9 °F (36 6 °C)   Weight: 124 kg (274 lb 6 4 oz)   Height: 5' 8" (1 727 m)     Body mass index is 41 72 kg/m²  Physical Exam  Vitals signs reviewed  Constitutional:       General: He is not in acute distress  Appearance: He is well-developed  He is not diaphoretic     HENT:      Head: Normocephalic and atraumatic  Eyes:      Conjunctiva/sclera: Conjunctivae normal    Cardiovascular:      Rate and Rhythm: Normal rate and regular rhythm  Heart sounds: Normal heart sounds  No murmur  No friction rub  No gallop  Pulmonary:      Effort: Pulmonary effort is normal  No respiratory distress  Breath sounds: Normal breath sounds  No wheezing or rales  Neurological:      Mental Status: He is alert and oriented to person, place, and time  Psychiatric:         Behavior: Behavior normal          Thought Content: Thought content normal          Judgment: Judgment normal          BMI Counseling: Body mass index is 41 72 kg/m²  The BMI is above normal  Nutrition recommendations include reducing portion sizes, decreasing overall calorie intake, 3-5 servings of fruits/vegetables daily, reducing fast food intake, consuming healthier snacks, decreasing soda and/or juice intake, moderation in carbohydrate intake, increasing intake of lean protein, reducing intake of saturated fat and trans fat and reducing intake of cholesterol

## 2020-08-11 ENCOUNTER — HOSPITAL ENCOUNTER (OUTPATIENT)
Dept: ULTRASOUND IMAGING | Facility: HOSPITAL | Age: 45
Discharge: HOME | End: 2020-08-11
Payer: COMMERCIAL

## 2020-08-11 DIAGNOSIS — M25.562 CHRONIC PAIN OF LEFT KNEE: ICD-10-CM

## 2020-08-11 DIAGNOSIS — G89.29 CHRONIC PAIN OF LEFT KNEE: ICD-10-CM

## 2020-08-11 PROCEDURE — 76882 US LMTD JT/FCL EVL NVASC XTR: CPT

## 2020-08-12 ENCOUNTER — TELEPHONE (OUTPATIENT)
Dept: FAMILY MEDICINE CLINIC | Facility: CLINIC | Age: 45
End: 2020-08-12

## 2020-08-12 DIAGNOSIS — E11.9 TYPE 2 DIABETES MELLITUS WITHOUT COMPLICATION, WITHOUT LONG-TERM CURRENT USE OF INSULIN (HCC): ICD-10-CM

## 2020-08-12 DIAGNOSIS — E11.9 TYPE 2 DIABETES MELLITUS WITHOUT COMPLICATION, WITHOUT LONG-TERM CURRENT USE OF INSULIN (HCC): Primary | ICD-10-CM

## 2020-08-12 RX ORDER — LANCETS
EACH MISCELLANEOUS 2 TIMES DAILY
Qty: 100 EACH | Refills: 5 | Status: SHIPPED | OUTPATIENT
Start: 2020-08-12 | End: 2020-08-12

## 2020-08-12 RX ORDER — LANCETS
EACH MISCELLANEOUS
Qty: 100 EACH | Refills: 5 | Status: SHIPPED | OUTPATIENT
Start: 2020-08-12

## 2020-08-12 RX ORDER — BLOOD SUGAR DIAGNOSTIC
STRIP MISCELLANEOUS
Qty: 100 EACH | Refills: 5 | Status: SHIPPED | OUTPATIENT
Start: 2020-08-12 | End: 2020-08-12

## 2020-08-12 RX ORDER — BLOOD SUGAR DIAGNOSTIC
STRIP MISCELLANEOUS
Refills: 5 | OUTPATIENT
Start: 2020-08-12

## 2020-08-12 RX ORDER — BLOOD-GLUCOSE METER
EACH MISCELLANEOUS 2 TIMES DAILY
Qty: 1 EACH | Refills: 0 | Status: SHIPPED | OUTPATIENT
Start: 2020-08-12

## 2020-08-12 RX ORDER — BLOOD-GLUCOSE METER
EACH MISCELLANEOUS 2 TIMES DAILY
Qty: 1 KIT | Refills: 0 | Status: SHIPPED | OUTPATIENT
Start: 2020-08-12 | End: 2020-08-12

## 2020-08-12 RX ORDER — LANCETS
EACH MISCELLANEOUS
Qty: 102 EACH | Refills: 5 | OUTPATIENT
Start: 2020-08-12

## 2020-08-12 RX ORDER — BLOOD-GLUCOSE METER
EACH MISCELLANEOUS
Refills: 0 | OUTPATIENT
Start: 2020-08-12

## 2020-08-12 RX ORDER — BLOOD SUGAR DIAGNOSTIC
STRIP MISCELLANEOUS
Qty: 100 EACH | Refills: 5 | Status: SHIPPED | OUTPATIENT
Start: 2020-08-12

## 2020-08-12 NOTE — TELEPHONE ENCOUNTER
Patient's wife requesting a meter for patient to check his blood sugars  Asking if you would know if there is a specific one that can help the patient also track his food intake for the day  States patient will do better if he sees what he ate that day rather than trying to remember  Also reports, patient will go almost all day with nothing to eat and then when he gets home he is starving and doesn't pay attention to what he is consuming  She is trying to work on this with him

## 2020-08-12 NOTE — TELEPHONE ENCOUNTER
I am not sure if there is a meter that will track food intake  I will put in for 1  Have him check his sugars twice a day

## 2020-09-03 DIAGNOSIS — E11.9 TYPE 2 DIABETES MELLITUS WITHOUT COMPLICATION, WITHOUT LONG-TERM CURRENT USE OF INSULIN (HCC): ICD-10-CM

## 2020-09-03 PROCEDURE — 4010F ACE/ARB THERAPY RXD/TAKEN: CPT | Performed by: ORTHOPAEDIC SURGERY

## 2020-09-03 RX ORDER — LISINOPRIL 10 MG/1
TABLET ORAL
Qty: 30 TABLET | Refills: 5 | Status: SHIPPED | OUTPATIENT
Start: 2020-09-03 | End: 2021-03-03

## 2020-09-09 ENCOUNTER — OFFICE VISIT (OUTPATIENT)
Dept: OBGYN CLINIC | Facility: CLINIC | Age: 45
End: 2020-09-09
Payer: COMMERCIAL

## 2020-09-09 VITALS
SYSTOLIC BLOOD PRESSURE: 131 MMHG | HEIGHT: 68 IN | WEIGHT: 278 LBS | HEART RATE: 82 BPM | TEMPERATURE: 97.2 F | BODY MASS INDEX: 42.13 KG/M2 | DIASTOLIC BLOOD PRESSURE: 82 MMHG

## 2020-09-09 DIAGNOSIS — S83.232A COMPLEX TEAR OF MEDIAL MENISCUS OF LEFT KNEE AS CURRENT INJURY, INITIAL ENCOUNTER: Primary | ICD-10-CM

## 2020-09-09 PROCEDURE — 1036F TOBACCO NON-USER: CPT | Performed by: ORTHOPAEDIC SURGERY

## 2020-09-09 PROCEDURE — 99213 OFFICE O/P EST LOW 20 MIN: CPT | Performed by: ORTHOPAEDIC SURGERY

## 2020-09-09 NOTE — PROGRESS NOTES
Chief Complaint  Left knee pain and instability    History Of Presenting Illness  Domingo Patel 1975 presents with left knee pain and instability since December 2019  Initially onset after a fall  Symptoms gradually getting worse  Not improved with physical therapy  Patient had stopped physical therapy of her couple sessions due to increasing pain and instability  Knee has given out a few times  Patient is currently not working and is on disability due to anxiety disorder  Patient's pain is mainly in the medial joint line  He has multiple episodes of pseudo locking  Patient underwent an ultrasound recently for Baker's cyst which was negative  Patient also had radiographs show negative  Current Medications  Current Outpatient Medications   Medication Sig Dispense Refill    aspirin 81 mg chewable tablet Chew 81 mg daily      atorvastatin (LIPITOR) 10 mg tablet take 1 tablet by mouth once daily 30 tablet 5    Blood Glucose Monitoring Suppl (ONE TOUCH ULTRA 2) w/Device KIT by Does not apply route 2 (two) times a day 1 each 0    cholecalciferol (VITAMIN D3) 1,000 units tablet Take 50,000 Units by mouth once a week      divalproex sodium (DEPAKOTE) 500 mg EC tablet Take 500 mg by mouth 2 (two) times a day        glucose blood (OneTouch Ultra) test strip Test sugar  each 5    Lancets (onetouch ultrasoft) lancets Test sugar  each 5    lisinopril (ZESTRIL) 10 mg tablet take 1 tablet by mouth once daily 30 tablet 5    metFORMIN (GLUCOPHAGE) 1000 MG tablet take 1 tablet by mouth twice a day 60 tablet 5    methocarbamol (ROBAXIN) 500 mg tablet Take 1 tablet (500 mg total) by mouth 3 (three) times a day as needed for muscle spasms 30 tablet 0    naproxen (NAPROSYN) 500 mg tablet Take 1 tablet (500 mg total) by mouth 2 (two) times a day as needed for moderate pain Take with food 30 tablet 0    risperiDONE (RisperDAL) 2 mg tablet Take 1 tablet (2 mg total) by mouth 2 (two) times a day 30 tablet 0    gabapentin (NEURONTIN) 400 mg capsule Take 1 capsule (400 mg total) by mouth 3 (three) times a day 1 capsule at bedtime for 2 days, then 1 capsule twice a day for 2 days, then 1 capsule 3 times a day  (Patient not taking: Reported on 8/10/2020) 90 capsule 0     No current facility-administered medications for this visit          Current Problems    Active Problems:   Patient Active Problem List    Diagnosis Date Noted    Chronic pain of left knee 08/10/2020    Lumbar disc disease with radiculopathy 02/17/2020    Chronic left-sided low back pain with left-sided sciatica 01/29/2020    Acute pain of left wrist 11/17/2019    Obstructive sleep apnea syndrome 04/21/2019    Hypomagnesemia 04/21/2019    Chest pain with moderate risk for cardiac etiology 04/20/2019    Dizziness 04/20/2019    History of pulmonary embolus (PE) 08/14/2018    Bipolar 1 disorder ([facility] ) 08/14/2018    Mild intermittent asthma without complication 91/89/1836    Hematemesis without nausea 08/14/2018    Right calf pain 08/14/2018    Type 2 diabetes mellitus without complication, without long-term current use of insulin ([address] ) 05/09/2012         Review of Systems:    General: negative for - chills, fatigue, fever,  weight gain or weight loss  Psychological: negative for - anxiety, behavioral disorder, concentration difficulties  Ophthalmic: negative for - blurry vision, decreased vision, double vision,      Past Medical History:   Past Medical History:   Diagnosis Date    Allergic rhinitis     resolved 6/20/17    Asthma     resolved 6/20/17    Bipolar 1 disorder ([address] )     resolved 6/20/17    Chronic kidney disease     Diabetes mellitus ([address] )     Hematuria     resolved 6/20/17    Hyperlipidemia     Leukocytosis     resolved 6/20/17    Diana-Hussein tear     Pulmonary emboli ([address] )     resolved 6/20/17    Seizures ([address] )     Sleep apnea     resolved 6/20/17       Past Surgical History:   Past Surgical History: Procedure Laterality Date    ANGIOPLASTY      previous stent placement    CARDIAC SURGERY      NASAL SEPTUM SURGERY         Family History:  Family history reviewed and non-contributory  Family History   Problem Relation Age of Onset    Cancer Mother     Diabetes Mother     Hypertension Mother     Asthma Father     Cancer Paternal Grandfather     Colon cancer Family        Social History:  Social History     Socioeconomic History    Marital status: /Civil Union     Spouse name: None    Number of children: None    Years of education: None    Highest education level: None   Occupational History    None   Social Needs    Financial resource strain: None    Food insecurity     Worry: None     Inability: None    Transportation needs     Medical: None     Non-medical: None   Tobacco Use    Smoking status: Never Smoker    Smokeless tobacco: Current User     Types: Chew   Substance and Sexual Activity    Alcohol use: Never     Alcohol/week: 0 0 standard drinks     Frequency: Never     Drinks per session: Patient refused     Binge frequency: Never     Comment: occasional as per [employer]    Drug use: No    Sexual activity: None   Lifestyle    Physical activity     Days per week: None     Minutes per session: None    Stress: None   Relationships    Social connections     Talks on phone: None     Gets together: None     Attends Mandaen service: None     Active member of club or organization: None     Attends meetings of clubs or organizations: None     Relationship status: None    Intimate partner violence     Fear of current or ex partner: None     Emotionally abused: None     Physically abused: None     Forced sexual activity: None   Other Topics Concern    None   Social History Narrative    None       Allergies:    Allergies   Allergen Reactions    Epinephrine Other (See Comments)     "pass out"    Iodine Other (See Comments)     shellfish    Other Other (See Comments)     NAUSEA VOMITING TO SHELLFISH    Shellfish-Derived Products GI Intolerance           Physical ExaminationBP 131/82 (BP Location: Right arm, Patient Position: Sitting, Cuff Size: Large)   Pulse 82   Temp (!) 97 2 °F (36 2 °C) (Tympanic)   Ht 5' 8" (1 727 m)   Wt 126 kg (278 lb)   BMI 42 27 kg/m²   Gen: Alert and oriented to person, place, time  HEENT: EOMI, eyes clear, moist mucus membranes, hearing intact      Orthopedic Exam  Left knee no effusion  Lacks the last few degrees of extension extremely tender the medial joint line with Judy's positive  Cruciate collateral stable  Calf soft non-tender hip pain-free range of motion  Radiographs of the left knee and ultrasound of the left knee with a normal limits          Impression  Left knee symptomatic medial meniscal tear          Plan    Patient has failed physical therapy and nonoperative measures and will need an MRI to decide on further management most likely an arthroscopy  Follow-up with the results of MRI    Patrizia Eldridge MD        Portions of the record may have been created with voice recognition software  Occasional wrong word or "sound a like" substitutions may have occurred due to the inherent limitations of voice recognition software  Read the chart carefully and recognize, using context, where substitutions have occurred

## 2020-09-10 ENCOUNTER — HOSPITAL ENCOUNTER (OUTPATIENT)
Facility: HOSPITAL | Age: 45
Setting detail: OBSERVATION
Discharge: HOME | End: 2020-09-11
Attending: EMERGENCY MEDICINE | Admitting: FAMILY MEDICINE
Payer: COMMERCIAL

## 2020-09-10 ENCOUNTER — APPOINTMENT (EMERGENCY)
Dept: CT IMAGING | Facility: HOSPITAL | Age: 45
End: 2020-09-10
Payer: COMMERCIAL

## 2020-09-10 ENCOUNTER — APPOINTMENT (OUTPATIENT)
Dept: CT IMAGING | Facility: HOSPITAL | Age: 45
End: 2020-09-10
Payer: COMMERCIAL

## 2020-09-10 ENCOUNTER — APPOINTMENT (OUTPATIENT)
Dept: NON INVASIVE DIAGNOSTICS | Facility: HOSPITAL | Age: 45
End: 2020-09-10
Payer: COMMERCIAL

## 2020-09-10 DIAGNOSIS — R79.0 LOW MAGNESIUM LEVEL: ICD-10-CM

## 2020-09-10 DIAGNOSIS — R55 SYNCOPE: ICD-10-CM

## 2020-09-10 DIAGNOSIS — R07.9 CHEST PAIN: Primary | ICD-10-CM

## 2020-09-10 PROBLEM — E78.2 MIXED DYSLIPIDEMIA: Status: ACTIVE | Noted: 2020-09-10

## 2020-09-10 PROBLEM — R07.89 ATYPICAL CHEST PAIN: Status: ACTIVE | Noted: 2019-04-20

## 2020-09-10 PROBLEM — E66.01 MORBID OBESITY (HCC): Status: ACTIVE | Noted: 2020-09-10

## 2020-09-10 LAB
ALBUMIN SERPL BCP-MCNC: 3.4 G/DL (ref 3.5–5)
ALP SERPL-CCNC: 51 U/L (ref 46–116)
ALT SERPL W P-5'-P-CCNC: 46 U/L (ref 12–78)
ANION GAP SERPL CALCULATED.3IONS-SCNC: 8 MMOL/L (ref 4–13)
APTT PPP: 29 SECONDS (ref 23–37)
AST SERPL W P-5'-P-CCNC: 22 U/L (ref 5–45)
ATRIAL RATE: 67 BPM
ATRIAL RATE: 68 BPM
BASOPHILS # BLD AUTO: 0.05 THOUSANDS/ΜL (ref 0–0.1)
BASOPHILS NFR BLD AUTO: 1 % (ref 0–1)
BILIRUB SERPL-MCNC: 0.4 MG/DL (ref 0.2–1)
BUN SERPL-MCNC: 11 MG/DL (ref 5–25)
CALCIUM SERPL-MCNC: 9.2 MG/DL (ref 8.3–10.1)
CHLORIDE SERPL-SCNC: 102 MMOL/L (ref 100–108)
CO2 SERPL-SCNC: 30 MMOL/L (ref 21–32)
CREAT SERPL-MCNC: 0.85 MG/DL (ref 0.6–1.3)
D DIMER PPP FEU-MCNC: 0.29 UG/ML FEU
EOSINOPHIL # BLD AUTO: 0.22 THOUSAND/ΜL (ref 0–0.61)
EOSINOPHIL NFR BLD AUTO: 3 % (ref 0–6)
ERYTHROCYTE [DISTWIDTH] IN BLOOD BY AUTOMATED COUNT: 13.5 % (ref 11.6–15.1)
GFR SERPL CREATININE-BSD FRML MDRD: 106 ML/MIN/1.73SQ M
GLUCOSE SERPL-MCNC: 101 MG/DL (ref 65–140)
GLUCOSE SERPL-MCNC: 111 MG/DL (ref 65–140)
GLUCOSE SERPL-MCNC: 138 MG/DL (ref 65–140)
GLUCOSE SERPL-MCNC: 194 MG/DL (ref 65–140)
HCT VFR BLD AUTO: 42.3 % (ref 36.5–49.3)
HGB BLD-MCNC: 14.6 G/DL (ref 12–17)
IMM GRANULOCYTES # BLD AUTO: 0.04 THOUSAND/UL (ref 0–0.2)
IMM GRANULOCYTES NFR BLD AUTO: 1 % (ref 0–2)
INR PPP: 1.06 (ref 0.84–1.19)
LYMPHOCYTES # BLD AUTO: 2.54 THOUSANDS/ΜL (ref 0.6–4.47)
LYMPHOCYTES NFR BLD AUTO: 32 % (ref 14–44)
MAGNESIUM SERPL-MCNC: 1.4 MG/DL (ref 1.6–2.6)
MCH RBC QN AUTO: 29.7 PG (ref 26.8–34.3)
MCHC RBC AUTO-ENTMCNC: 34.5 G/DL (ref 31.4–37.4)
MCV RBC AUTO: 86 FL (ref 82–98)
MONOCYTES # BLD AUTO: 0.44 THOUSAND/ΜL (ref 0.17–1.22)
MONOCYTES NFR BLD AUTO: 6 % (ref 4–12)
NEUTROPHILS # BLD AUTO: 4.57 THOUSANDS/ΜL (ref 1.85–7.62)
NEUTS SEG NFR BLD AUTO: 57 % (ref 43–75)
NRBC BLD AUTO-RTO: 0 /100 WBCS
P AXIS: 29 DEGREES
P AXIS: 31 DEGREES
PLATELET # BLD AUTO: 208 THOUSANDS/UL (ref 149–390)
PLATELET # BLD AUTO: 208 THOUSANDS/UL (ref 149–390)
PMV BLD AUTO: 8.8 FL (ref 8.9–12.7)
PMV BLD AUTO: 9.2 FL (ref 8.9–12.7)
POTASSIUM SERPL-SCNC: 3.8 MMOL/L (ref 3.5–5.3)
PR INTERVAL: 124 MS
PR INTERVAL: 136 MS
PROT SERPL-MCNC: 7.7 G/DL (ref 6.4–8.2)
PROTHROMBIN TIME: 13.6 SECONDS (ref 11.6–14.5)
QRS AXIS: -1 DEGREES
QRS AXIS: 0 DEGREES
QRSD INTERVAL: 82 MS
QRSD INTERVAL: 84 MS
QT INTERVAL: 368 MS
QT INTERVAL: 396 MS
QTC INTERVAL: 388 MS
QTC INTERVAL: 421 MS
RBC # BLD AUTO: 4.92 MILLION/UL (ref 3.88–5.62)
SODIUM SERPL-SCNC: 140 MMOL/L (ref 136–145)
T WAVE AXIS: 16 DEGREES
T WAVE AXIS: 23 DEGREES
TROPONIN I SERPL-MCNC: <0.02 NG/ML
TSH SERPL DL<=0.05 MIU/L-ACNC: 2.6 UIU/ML (ref 0.36–3.74)
VALPROATE SERPL-MCNC: 18 UG/ML (ref 50–100)
VENTRICULAR RATE: 67 BPM
VENTRICULAR RATE: 68 BPM
WBC # BLD AUTO: 7.86 THOUSAND/UL (ref 4.31–10.16)

## 2020-09-10 PROCEDURE — G1004 CDSM NDSC: HCPCS

## 2020-09-10 PROCEDURE — 84443 ASSAY THYROID STIM HORMONE: CPT | Performed by: EMERGENCY MEDICINE

## 2020-09-10 PROCEDURE — 70450 CT HEAD/BRAIN W/O DYE: CPT

## 2020-09-10 PROCEDURE — 85730 THROMBOPLASTIN TIME PARTIAL: CPT | Performed by: EMERGENCY MEDICINE

## 2020-09-10 PROCEDURE — 85379 FIBRIN DEGRADATION QUANT: CPT | Performed by: EMERGENCY MEDICINE

## 2020-09-10 PROCEDURE — 84484 ASSAY OF TROPONIN QUANT: CPT | Performed by: FAMILY MEDICINE

## 2020-09-10 PROCEDURE — 84484 ASSAY OF TROPONIN QUANT: CPT | Performed by: EMERGENCY MEDICINE

## 2020-09-10 PROCEDURE — 93005 ELECTROCARDIOGRAM TRACING: CPT

## 2020-09-10 PROCEDURE — 82948 REAGENT STRIP/BLOOD GLUCOSE: CPT

## 2020-09-10 PROCEDURE — 83735 ASSAY OF MAGNESIUM: CPT | Performed by: EMERGENCY MEDICINE

## 2020-09-10 PROCEDURE — 85025 COMPLETE CBC W/AUTO DIFF WBC: CPT | Performed by: EMERGENCY MEDICINE

## 2020-09-10 PROCEDURE — 93010 ELECTROCARDIOGRAM REPORT: CPT | Performed by: INTERNAL MEDICINE

## 2020-09-10 PROCEDURE — 74174 CTA ABD&PLVS W/CONTRAST: CPT

## 2020-09-10 PROCEDURE — 85610 PROTHROMBIN TIME: CPT | Performed by: EMERGENCY MEDICINE

## 2020-09-10 PROCEDURE — 99285 EMERGENCY DEPT VISIT HI MDM: CPT | Performed by: EMERGENCY MEDICINE

## 2020-09-10 PROCEDURE — 80164 ASSAY DIPROPYLACETIC ACD TOT: CPT | Performed by: EMERGENCY MEDICINE

## 2020-09-10 PROCEDURE — 99220 PR INITIAL OBSERVATION CARE/DAY 70 MINUTES: CPT | Performed by: FAMILY MEDICINE

## 2020-09-10 PROCEDURE — 80053 COMPREHEN METABOLIC PANEL: CPT | Performed by: EMERGENCY MEDICINE

## 2020-09-10 PROCEDURE — 93306 TTE W/DOPPLER COMPLETE: CPT | Performed by: INTERNAL MEDICINE

## 2020-09-10 PROCEDURE — 99285 EMERGENCY DEPT VISIT HI MDM: CPT

## 2020-09-10 PROCEDURE — 36415 COLL VENOUS BLD VENIPUNCTURE: CPT | Performed by: EMERGENCY MEDICINE

## 2020-09-10 PROCEDURE — 85049 AUTOMATED PLATELET COUNT: CPT | Performed by: FAMILY MEDICINE

## 2020-09-10 PROCEDURE — 93306 TTE W/DOPPLER COMPLETE: CPT

## 2020-09-10 PROCEDURE — 96365 THER/PROPH/DIAG IV INF INIT: CPT

## 2020-09-10 PROCEDURE — 71275 CT ANGIOGRAPHY CHEST: CPT

## 2020-09-10 RX ORDER — SODIUM CHLORIDE 9 MG/ML
100 INJECTION, SOLUTION INTRAVENOUS ONCE
Status: COMPLETED | OUTPATIENT
Start: 2020-09-10 | End: 2020-09-10

## 2020-09-10 RX ORDER — ACETAMINOPHEN 325 MG/1
650 TABLET ORAL ONCE
Status: COMPLETED | OUTPATIENT
Start: 2020-09-10 | End: 2020-09-10

## 2020-09-10 RX ORDER — MAGNESIUM HYDROXIDE/ALUMINUM HYDROXICE/SIMETHICONE 120; 1200; 1200 MG/30ML; MG/30ML; MG/30ML
30 SUSPENSION ORAL ONCE
Status: COMPLETED | OUTPATIENT
Start: 2020-09-10 | End: 2020-09-10

## 2020-09-10 RX ORDER — RISPERIDONE 1 MG/1
2 TABLET, FILM COATED ORAL 2 TIMES DAILY
Status: DISCONTINUED | OUTPATIENT
Start: 2020-09-10 | End: 2020-09-11 | Stop reason: HOSPADM

## 2020-09-10 RX ORDER — DIVALPROEX SODIUM 250 MG/1
500 TABLET, DELAYED RELEASE ORAL 2 TIMES DAILY
Status: DISCONTINUED | OUTPATIENT
Start: 2020-09-10 | End: 2020-09-11 | Stop reason: HOSPADM

## 2020-09-10 RX ORDER — LIDOCAINE HYDROCHLORIDE 20 MG/ML
15 SOLUTION OROPHARYNGEAL ONCE
Status: COMPLETED | OUTPATIENT
Start: 2020-09-10 | End: 2020-09-10

## 2020-09-10 RX ORDER — BUTALBITAL, ACETAMINOPHEN AND CAFFEINE 50; 325; 40 MG/1; MG/1; MG/1
1 TABLET ORAL EVERY 4 HOURS PRN
Status: DISCONTINUED | OUTPATIENT
Start: 2020-09-10 | End: 2020-09-11 | Stop reason: HOSPADM

## 2020-09-10 RX ORDER — ASPIRIN 81 MG/1
324 TABLET, CHEWABLE ORAL ONCE
Status: COMPLETED | OUTPATIENT
Start: 2020-09-10 | End: 2020-09-10

## 2020-09-10 RX ORDER — FAMOTIDINE 20 MG/1
20 TABLET, FILM COATED ORAL 2 TIMES DAILY
Status: DISCONTINUED | OUTPATIENT
Start: 2020-09-10 | End: 2020-09-11 | Stop reason: HOSPADM

## 2020-09-10 RX ORDER — MAGNESIUM SULFATE HEPTAHYDRATE 40 MG/ML
2 INJECTION, SOLUTION INTRAVENOUS ONCE
Status: DISCONTINUED | OUTPATIENT
Start: 2020-09-10 | End: 2020-09-10

## 2020-09-10 RX ORDER — ATORVASTATIN CALCIUM 10 MG/1
10 TABLET, FILM COATED ORAL DAILY
Status: DISCONTINUED | OUTPATIENT
Start: 2020-09-10 | End: 2020-09-11 | Stop reason: HOSPADM

## 2020-09-10 RX ORDER — ASPIRIN 81 MG/1
81 TABLET, CHEWABLE ORAL DAILY
Status: DISCONTINUED | OUTPATIENT
Start: 2020-09-11 | End: 2020-09-11 | Stop reason: HOSPADM

## 2020-09-10 RX ORDER — NITROGLYCERIN 0.4 MG/1
0.4 TABLET SUBLINGUAL ONCE
Status: COMPLETED | OUTPATIENT
Start: 2020-09-10 | End: 2020-09-10

## 2020-09-10 RX ORDER — LISINOPRIL 10 MG/1
10 TABLET ORAL DAILY
Status: DISCONTINUED | OUTPATIENT
Start: 2020-09-10 | End: 2020-09-11 | Stop reason: HOSPADM

## 2020-09-10 RX ADMIN — BUTALBITAL, ACETAMINOPHEN AND CAFFEINE 1 TABLET: 50; 325; 40 TABLET ORAL at 17:11

## 2020-09-10 RX ADMIN — LISINOPRIL 10 MG: 10 TABLET ORAL at 12:49

## 2020-09-10 RX ADMIN — ATORVASTATIN CALCIUM 10 MG: 10 TABLET, FILM COATED ORAL at 17:11

## 2020-09-10 RX ADMIN — ENOXAPARIN SODIUM 30 MG: 30 INJECTION SUBCUTANEOUS at 12:49

## 2020-09-10 RX ADMIN — MAGNESIUM OXIDE 400 MG (241.3 MG MAGNESIUM) TABLET 400 MG: TABLET at 20:23

## 2020-09-10 RX ADMIN — MAGNESIUM SULFATE IN WATER 2 G: 40 INJECTION, SOLUTION INTRAVENOUS at 10:20

## 2020-09-10 RX ADMIN — MAGNESIUM OXIDE 400 MG (241.3 MG MAGNESIUM) TABLET 400 MG: TABLET at 12:49

## 2020-09-10 RX ADMIN — LIDOCAINE HYDROCHLORIDE 15 ML: 20 SOLUTION ORAL; TOPICAL at 11:07

## 2020-09-10 RX ADMIN — FAMOTIDINE 20 MG: 20 TABLET ORAL at 20:23

## 2020-09-10 RX ADMIN — RISPERIDONE 2 MG: 1 TABLET ORAL at 20:23

## 2020-09-10 RX ADMIN — DIVALPROEX SODIUM 500 MG: 250 TABLET, DELAYED RELEASE ORAL at 20:23

## 2020-09-10 RX ADMIN — ALUMINUM HYDROXIDE, MAGNESIUM HYDROXIDE, AND SIMETHICONE 30 ML: 200; 200; 20 SUSPENSION ORAL at 11:07

## 2020-09-10 RX ADMIN — SODIUM CHLORIDE 100 ML/HR: 0.9 INJECTION, SOLUTION INTRAVENOUS at 12:49

## 2020-09-10 RX ADMIN — ASPIRIN 81 MG 324 MG: 81 TABLET ORAL at 11:06

## 2020-09-10 RX ADMIN — NITROGLYCERIN 0.4 MG: 0.4 TABLET SUBLINGUAL at 09:04

## 2020-09-10 RX ADMIN — FAMOTIDINE 20 MG: 20 TABLET ORAL at 12:48

## 2020-09-10 RX ADMIN — IOHEXOL 100 ML: 350 INJECTION, SOLUTION INTRAVENOUS at 09:37

## 2020-09-10 RX ADMIN — ACETAMINOPHEN 650 MG: 325 TABLET, FILM COATED ORAL at 09:28

## 2020-09-10 NOTE — H&P
H&P Exam - Loyda Orellana 40 y o  male MRN: [id number]    Unit/Bed#: RM01 Encounter: [id number]      Atypical chest pain  Assessment & Plan    EKG without any changes  Admit to telemetry and trend troponin x3 with serial EKGs  Patient had a negative stress test 7 months ago however no echo was obtained will obtain a 2D echocardiogram   Check lipid panel, A1c reviewed  Check TSH  Start Pepcid 20 mg p o  B i d     Morbid obesity ([address] )  Assessment & Plan  Encourage therapeutic lifestyle changes    Mixed dyslipidemia  Assessment & Plan  Continue Lipitor and check a fasting lipid panel    Hypomagnesemia  Assessment & Plan  Replete with magnesium oxide 400 mg p o  B i d     Bipolar 1 disorder (HCC)  Assessment & Plan  Continue Risperdal and Depakote    Type 2 diabetes mellitus without complication, without long-term current use of insulin (HCC)  Assessment & Plan  Lab Results   Component Value Date    HGBA1C 7 9 (H) 08/10/2020     Blood Sugar Average: Last 72 hrs: Moderately well controlled  Hold metformin for 24 hours  Diabetic diet along with insulin sliding scale coverage  Accu-Cheks AC and HS    History of Present Illness   The patient is a pleasant 45-year-old male with past medical history significant for type 2 diabetes and hypertension presents to the ED today with a chief complaint of chest pain  Patient is known to have chronic chest pain but states this time was different  He moved his bowels and was at the sink when he developed midsternal chest pain, without radiation, shortness of breath or diaphoresis  He believes he syncopized but he did not fall down, he was able to hold himself up against the sink  The chest pain persisted and patient received nitroglycerin in the ED, nitroglycerin did not change the intensity of the pain  Chest pain is not associated with any nausea or vomiting  All the patient does complain of some bloating and some typical reflux symptoms      Review of Systems   Constitutional: Negative  HENT: Negative  Respiratory: Negative for shortness of breath  Cardiovascular: Positive for chest pain  Gastrointestinal: Positive for abdominal distention  Genitourinary: Negative  Musculoskeletal: Negative  Neurological: Positive for dizziness  Hematological: Negative  Psychiatric/Behavioral: Negative          Historical Information   Past Medical History:   Diagnosis Date    Allergic rhinitis     resolved 6/20/17    Asthma     resolved 6/20/17    Bipolar 1 disorder ([facility] [address] )     resolved 6/20/17    Chronic kidney disease     Diabetes mellitus ([facility] )     Hematuria     resolved 6/20/17    Hyperlipidemia     Leukocytosis     resolved 6/20/17    Diana-Hussein tear     Pulmonary emboli ([address] )     resolved 6/20/17    Seizures ([facility] )     Sleep apnea     resolved 6/20/17     Past Surgical History:   Procedure Laterality Date    ANGIOPLASTY      previous stent placement    CARDIAC SURGERY      NASAL SEPTUM SURGERY       Social History   Social History     Substance and Sexual Activity   Alcohol Use Never    Alcohol/week: 0 0 standard drinks    Frequency: Never    Drinks per session: Patient refused    Binge frequency: Never    Comment: occasional as per [employer]     Social History     Substance and Sexual Activity   Drug Use No     Social History     Tobacco Use   Smoking Status Never Smoker   Smokeless Tobacco Former User    Types: Chew     E-Cigarette Use: Never User     E-Cigarette/Vaping Substances       Family History: non-contributory    Meds/Allergies   all medications and allergies reviewed  Allergies   Allergen Reactions    Epinephrine Other (See Comments)     "pass out"    Iodine Other (See Comments)     shellfish    Other Other (See Comments)     NAUSEA VOMITING TO SHELLFISH    Shellfish-Derived Products GI Intolerance       Objective   First Vitals:   Blood Pressure: 163/87 (09/10/20 0837)  Pulse: 72 (09/10/20 0837)  Temperature: 97 5 °F (36 4 °C) (09/10/20 3299)  Temp Source: Temporal (09/10/20 0837)  Respirations: 17 (09/10/20 0837)  Weight - Scale: 128 kg (282 lb 6 6 oz) (09/10/20 0837)  SpO2: 100 % (09/10/20 0837)    Current Vitals:   Blood Pressure: 128/79 (09/10/20 1030)  Pulse: 61 (09/10/20 1030)  Temperature: 97 5 °F (36 4 °C) (09/10/20 0837)  Temp Source: Temporal (09/10/20 0837)  Respirations: 12 (09/10/20 1030)  Weight - Scale: 128 kg (282 lb 6 6 oz) (09/10/20 0837)  SpO2: 98 % (09/10/20 1030)    No intake or output data in the 24 hours ending 09/10/20 1113    Invasive Devices     Peripheral Intravenous Line            Peripheral IV 09/10/20 Left;Ventral (anterior) Antecubital less than 1 day                Physical Exam  Constitutional:       Appearance: He is obese  HENT:      Head: Normocephalic and atraumatic  Mouth/Throat:      Mouth: Mucous membranes are moist       Pharynx: Oropharynx is clear  Eyes:      Extraocular Movements: Extraocular movements intact  Pupils: Pupils are equal, round, and reactive to light  Neck:      Musculoskeletal: Normal range of motion  Cardiovascular:      Rate and Rhythm: Normal rate and regular rhythm  Pulmonary:      Effort: Pulmonary effort is normal       Breath sounds: Normal breath sounds  Musculoskeletal: Normal range of motion  General: No swelling  Skin:     General: Skin is warm and dry  Capillary Refill: Capillary refill takes 2 to 3 seconds  Neurological:      General: No focal deficit present  Mental Status: He is alert and oriented to person, place, and time           Lab Results:   Lab Results   Component Value Date    WBC 7 86 09/10/2020    HGB 14 6 09/10/2020    HCT 42 3 09/10/2020    MCV 86 09/10/2020     09/10/2020     Lab Results   Component Value Date     (L) 11/13/2015    SODIUM 140 09/10/2020    K 3 8 09/10/2020     09/10/2020    CO2 30 09/10/2020    ANIONGAP 11 11/13/2015    AGAP 8 09/10/2020    BUN 11 09/10/2020    CREATININE 0 85 09/10/2020    GLUC 138 09/10/2020    GLUF 166 (H) 08/10/2020    CALCIUM 9 2 09/10/2020    AST 22 09/10/2020    ALT 46 09/10/2020    ALKPHOS 51 09/10/2020    PROT 7 4 11/13/2015    TP 7 7 09/10/2020    BILITOT 0 30 11/13/2015    TBILI 0 40 09/10/2020    EGFR 106 09/10/2020       Imaging:   CTA dissection protocol chest abdomen pelvis w wo contrast   Final Result      Normal CT angiogram of the chest, abdomen, and pelvis      Dependent tracheal nodule likely mucus  Hepatic steatosis                    Workstation performed: [id number]             EKG, Pathology, and Other Studies:   NSR    Code Status: Prior  Advance Directive and Living Will:      Power of :    POLST:      Counseling / Coordination of Care:

## 2020-09-10 NOTE — PLAN OF CARE
Problem: Potential for Falls  Goal: Patient will remain free of falls  Description: INTERVENTIONS:  - Assess patient frequently for physical needs  -  Identify cognitive and physical deficits and behaviors that affect risk of falls    -  [place] fall precautions as indicated by assessment   - Educate patient/family on patient safety including physical limitations  - Instruct patient to call for assistance with activity based on assessment  - Modify environment to reduce risk of injury  - Consider OT/PT consult to assist with strengthening/mobility  Outcome: Progressing     Problem: PAIN - ADULT  Goal: Verbalizes/displays adequate comfort level or baseline comfort level  Description: Interventions:  - Encourage patient to monitor pain and request assistance  - Assess pain using appropriate pain scale  - Administer analgesics based on type and severity of pain and evaluate response  - Implement non-pharmacological measures as appropriate and evaluate response  - Consider cultural and social influences on pain and pain management  - Notify physician/advanced practitioner if interventions unsuccessful or patient reports new pain  Outcome: Progressing     Problem: INFECTION - ADULT  Goal: Absence or prevention of progression during hospitalization  Description: INTERVENTIONS:  - Assess and monitor for signs and symptoms of infection  - Monitor lab/diagnostic results  - Monitor all insertion sites, i e  indwelling lines, tubes, and drains  - Monitor endotracheal if appropriate and nasal secretions for changes in amount and color  - [place] appropriate cooling/warming therapies per order  - Administer medications as ordered  - Instruct and encourage patient and family to use good hand hygiene technique  - Identify and instruct in appropriate isolation precautions for identified infection/condition  Outcome: Progressing  Goal: Absence of fever/infection during neutropenic period  Description: INTERVENTIONS:  - Monitor WBC    Outcome: Progressing     Problem: SAFETY ADULT  Goal: Patient will remain free of falls  Description: INTERVENTIONS:  - Assess patient frequently for physical needs  -  Identify cognitive and physical deficits and behaviors that affect risk of falls    -  [place] fall precautions as indicated by assessment   - Educate patient/family on patient safety including physical limitations  - Instruct patient to call for assistance with activity based on assessment  - Modify environment to reduce risk of injury  - Consider OT/PT consult to assist with strengthening/mobility  Outcome: Progressing  Goal: Maintain or return to baseline ADL function  Description: INTERVENTIONS:  -  Assess patient's ability to carry out ADLs; assess patient's baseline for ADL function and identify physical deficits which impact ability to perform ADLs (bathing, care of mouth/teeth, toileting, grooming, dressing, etc )  - Assess/evaluate cause of self-care deficits   - Assess range of motion  - Assess patient's mobility; develop plan if impaired  - Assess patient's need for assistive devices and provide as appropriate  - Encourage maximum independence but intervene and supervise when necessary  - Involve family in performance of ADLs  - Assess for home care needs following discharge   - Consider OT consult to assist with ADL evaluation and planning for discharge  - Provide patient education as appropriate  Outcome: Progressing  Goal: Maintain or return mobility status to optimal level  Description: INTERVENTIONS:  - Assess patient's baseline mobility status (ambulation, transfers, stairs, etc )    - Identify cognitive and physical deficits and behaviors that affect mobility  - Identify mobility aids required to assist with transfers and/or ambulation (gait belt, sit-to-stand, lift, walker, cane, etc )  - [place] fall precautions as indicated by assessment  - Record patient progress and toleration of activity level on Mobility SBAR; progress patient to next Phase/Stage  - Instruct patient to call for assistance with activity based on assessment  - Consider rehabilitation consult to assist with strengthening/weightbearing, etc   Outcome: Progressing     Problem: DISCHARGE PLANNING  Goal: Discharge to home or other facility with appropriate resources  Description: INTERVENTIONS:  - Identify barriers to discharge w/patient and caregiver  - Arrange for needed discharge resources and transportation as appropriate  - Identify discharge learning needs (meds, wound care, etc )  - Arrange for interpretive services to assist at discharge as needed  - Refer to Case Management Department for coordinating discharge planning if the patient needs post-hospital services based on physician/advanced practitioner order or complex needs related to functional status, cognitive ability, or social support system  Outcome: Progressing     Problem: Knowledge Deficit  Goal: Patient/family/caregiver demonstrates understanding of disease process, treatment plan, medications, and discharge instructions  Description: Complete learning assessment and assess knowledge base    Interventions:  - Provide teaching at level of understanding  - Provide teaching via preferred learning methods  Outcome: Progressing

## 2020-09-10 NOTE — ED PROVIDER NOTES
History  Chief Complaint   Patient presents with    Chest Pain     Started with chest pain last night around midnight  States this am he got up and felt sharp pain, fell against the wall and "passed out"   Syncope     59-year-old male vague historian presents with onset of chest pain which is sharp midsternal and nonradiating intermittently throughout the night but at 0800 after using the bathroom he had sudden worsening of his chest pain he felt like he got kicked in the chest"  any falls back against the wall between the wall and the sink he did not actually sustain a same level fall he states that he passed out due to lightheadedness he did not slide to the floor  He denies any shortness of breath he was not diaphoretic he was nauseated he denies pleuritic pain denies any worsening headache difficulty with vision or speech she has no abdominal pain but does have chronic low back pain  Denies worsening lower extremity edema increasing dyspnea on exertion he feels numb all over but no localizing numbness or paresthesias  He does describe a prior history of coronary artery disease denies prior fever chills cough or upper respiratory complaints  Prior to Admission Medications   Prescriptions Last Dose Informant Patient Reported? Taking?    Blood Glucose Monitoring Suppl (ONE TOUCH ULTRA 2) w/Device KIT 9/9/2020 at Unknown time  No Yes   Sig: by Does not apply route 2 (two) times a day   Lancets (onetouch ultrasoft) lancets 9/9/2020 at Unknown time  No Yes   Sig: Test sugar BID   aspirin 81 mg chewable tablet 9/10/2020 at Unknown time  Yes Yes   Sig: Chew 81 mg daily   atorvastatin (LIPITOR) 10 mg tablet 9/9/2020 at Unknown time  No Yes   Sig: take 1 tablet by mouth once daily   cholecalciferol (VITAMIN D3) 1,000 units tablet Not Taking at Unknown time Care Giver Yes No   Sig: Take 50,000 Units by mouth once a week   divalproex sodium (DEPAKOTE) 500 mg EC tablet 9/10/2020 at Unknown time  Yes Yes Sig: Take 500 mg by mouth 2 (two) times a day  glucose blood (OneTouch Ultra) test strip 9/9/2020 at Unknown time  No Yes   Sig: Test sugar BID   lisinopril (ZESTRIL) 10 mg tablet 9/9/2020 at Unknown time  No Yes   Sig: take 1 tablet by mouth once daily   metFORMIN (GLUCOPHAGE) 1000 MG tablet 9/10/2020 at Unknown time  No Yes   Sig: take 1 tablet by mouth twice a day   risperiDONE (RisperDAL) 2 mg tablet Not Taking at Unknown time  No No   Sig: Take 1 tablet (2 mg total) by mouth 2 (two) times a day   Patient not taking: Reported on 9/10/2020      Facility-Administered Medications: None       Past Medical History:   Diagnosis Date    Allergic rhinitis     resolved 6/20/17    Asthma     resolved 6/20/17    B[facility] 1 disorder ([facility]     resolved 6/20/17    Chronic kidney disease     Diabetes mellitus ([facility]     Hematuria     resolved 6/20/17    Hyperlipidemia     Leukocytosis     resolved 6/20/17    Diana-Hussein tear     Pulmonary emboli ([facility] [address] [facility]     resolved 6/20/17    Seizures ([facility] [facility]     Sleep apnea     resolved 6/20/17       Past Surgical History:   Procedure Laterality Date    ANGIOPLASTY      previous stent placement    CARDIAC SURGERY      NASAL SEPTUM SURGERY         Family History   Problem Relation Age of Onset    Cancer Mother     Diabetes Mother     Hypertension Mother     Asthma Father     Cancer Paternal Grandfather     Colon cancer Family      I have reviewed and agree with the history as documented      E-Cigarette/Vaping    E-Cigarette Use Never User      E-Cigarette/Vaping Substances     Social History     Tobacco Use    Smoking status: Never Smoker    Smokeless tobacco: Former User     Types: Chew   Substance Use Topics    Alcohol use: Never     Alcohol/week: 0 0 standard drinks     Frequency: Never     Drinks per session: Patient refused     Binge frequency: Never     Comment: occasional as per [employer]    Drug use: No       Review of Systems   Constitutional: Positive for activity change  Negative for appetite change, chills, diaphoresis and fever  HENT: Negative for congestion, ear pain, rhinorrhea, sneezing and sore throat  Eyes: Negative for discharge  Respiratory: Negative for cough and shortness of breath  Cardiovascular: Positive for chest pain  Negative for leg swelling  Gastrointestinal: Positive for diarrhea and nausea  Negative for abdominal distention, abdominal pain, blood in stool and vomiting  Endocrine: Negative for polyuria  Genitourinary: Negative for difficulty urinating, dysuria, frequency and urgency  Musculoskeletal: Positive for back pain (chronic)  Negative for myalgias  Skin: Negative for rash  Neurological: Positive for light-headedness, numbness (entire body) and headaches (chronic)  Negative for dizziness and weakness  Hematological: Negative for adenopathy  Psychiatric/Behavioral: Negative for confusion  All other systems reviewed and are negative  Physical Exam  Physical Exam  Vitals signs and nursing note reviewed  Constitutional:       General: He is not in acute distress  Appearance: He is well-developed  He is obese  He is not ill-appearing, toxic-appearing or diaphoretic  HENT:      Head: Normocephalic and atraumatic  Right Ear: Tympanic membrane and external ear normal       Left Ear: Tympanic membrane and external ear normal       Nose: Nose normal       Mouth/Throat:      Mouth: Mucous membranes are moist       Pharynx: Oropharynx is clear  Eyes:      General: No scleral icterus  Right eye: No discharge  Left eye: No discharge  Extraocular Movements: Extraocular movements intact  Conjunctiva/sclera: Conjunctivae normal       Pupils: Pupils are equal, round, and reactive to light  Neck:      Musculoskeletal: Normal range of motion and neck supple  No muscular tenderness  Cardiovascular:      Rate and Rhythm: Normal rate and regular rhythm  Pulses: Normal pulses  Heart sounds: Normal heart sounds  Pulmonary:      Effort: Pulmonary effort is normal  No respiratory distress  Breath sounds: Normal breath sounds  No wheezing or rhonchi  Chest:      Chest wall: No tenderness  Abdominal:      General: Bowel sounds are normal  There is no distension  Palpations: Abdomen is soft  There is no mass  Tenderness: There is no abdominal tenderness  There is no right CVA tenderness, left CVA tenderness, guarding or rebound  Comments: Back: no mildline    Musculoskeletal: Normal range of motion  General: No swelling, tenderness, deformity or signs of injury  Right lower leg: No edema  Left lower leg: No edema  Lymphadenopathy:      Cervical: No cervical adenopathy  Skin:     General: Skin is warm and dry  Capillary Refill: Capillary refill takes less than 2 seconds  Neurological:      General: No focal deficit present  Mental Status: He is alert and oriented to person, place, and time  Cranial Nerves: No cranial nerve deficit  Sensory: No sensory deficit  Motor: No weakness or abnormal muscle tone        Coordination: Coordination normal       Deep Tendon Reflexes: Reflexes normal       Comments: Gait steady   Psychiatric:         Mood and Affect: Mood normal          Vital Signs  ED Triage Vitals   Temperature Pulse Respirations Blood Pressure SpO2   09/10/20 0837 09/10/20 0837 09/10/20 0837 09/10/20 0837 09/10/20 0837   97 5 °F (36 4 °C) 72 17 163/87 100 %      Temp Source Heart Rate Source Patient Position - Orthostatic VS BP Location FiO2 (%)   09/10/20 0837 09/10/20 0837 09/10/20 1200 09/10/20 0837 --   Temporal Monitor Sitting Left arm       Pain Score       09/10/20 0837       7           Vitals:    09/10/20 1000 09/10/20 1030 09/10/20 1200 09/10/20 1520   BP: 135/85 128/79 (!) 149/107 124/81   Pulse: 62 61 71 72   Patient Position - Orthostatic VS:   Sitting          Visual Acuity      ED Medications  Medications   divalproex sodium (DEPAKOTE) EC tablet 500 mg ( Oral Canceled Entry 9/10/20 1240)   aspirin chewable tablet 81 mg (has no administration in time range)   risperiDONE (RisperDAL) tablet 2 mg ( Oral Canceled Entry 9/10/20 1240)   atorvastatin (LIPITOR) tablet 10 mg (has no administration in time range)   lisinopril (ZESTRIL) tablet 10 mg (10 mg Oral Given 9/10/20 1249)   enoxaparin (LOVENOX) subcutaneous injection 30 mg (30 mg Subcutaneous Given 9/10/20 1249)   insulin lispro (HumaLOG) 100 units/mL subcutaneous injection 1-5 Units (1 Units Subcutaneous Not Given 9/10/20 1255)   famotidine (PEPCID) tablet 20 mg (20 mg Oral Given 9/10/20 1248)   magnesium oxide (MAG-OX) tablet 400 mg (400 mg Oral Given 9/10/20 1249)   nitroglycerin (NITROSTAT) SL tablet 0 4 mg (0 4 mg Sublingual Given 9/10/20 0904)   acetaminophen (TYLENOL) tablet 650 mg (650 mg Oral Given 9/10/20 0928)   iohexol (OMNIPAQUE) 350 MG/ML injection (SINGLE-DOSE) 100 mL (100 mL Intravenous Given 9/10/20 0937)   aspirin chewable tablet 324 mg (324 mg Oral Given 9/10/20 1106)   Lidocaine Viscous HCl (XYLOCAINE) 2 % mucosal solution 15 mL (15 mL Swish & Spit Given 9/10/20 1107)   aluminum-magnesium hydroxide-simethicone (MYLANTA) 200-200-20 mg/5 mL oral suspension 30 mL (30 mL Oral Given 9/10/20 1107)   sodium chloride 0 9 % infusion (100 mL/hr Intravenous New Bag 9/10/20 1249)       Diagnostic Studies  Results Reviewed     Procedure Component Value Units Date/Time    TSH, 3rd generation with Free T4 reflex [[id number]]  (Normal) Collected:  09/10/20 0856    Lab Status:  Final result Specimen:  Blood from Arm, Left Updated:  09/10/20 0929     TSH 3RD GENERATON 2 604 uIU/mL     Narrative:       Patients undergoing fluorescein dye angiography may retain small amounts of fluorescein in the body for 48-72 hours post procedure  Samples containing fluorescein can produce falsely depressed TSH values   If the patient had this procedure,a specimen should be resubmitted post fluorescein clearance        Magnesium [[id number]]  (Abnormal) Collected:  09/10/20 0856    Lab Status:  Final result Specimen:  Blood from Arm, Left Updated:  09/10/20 0929     Magnesium 1 4 mg/dL     Valproic acid level, total [[id number]]  (Abnormal) Collected:  09/10/20 0856    Lab Status:  Final result Specimen:  Blood from Arm, Left Updated:  09/10/20 0929     Valproic Acid, Total 18 ug/mL     Troponin I [[id number]]  (Normal) Collected:  09/10/20 0856    Lab Status:  Final result Specimen:  Blood from Arm, Left Updated:  09/10/20 0925     Troponin I <0 02 ng/mL     Comprehensive metabolic panel [[id number]]  (Abnormal) Collected:  09/10/20 0856    Lab Status:  Final result Specimen:  Blood from Arm, Left Updated:  09/10/20 0919     Sodium 140 mmol/L      Potassium 3 8 mmol/L      Chloride 102 mmol/L      CO2 30 mmol/L      ANION GAP 8 mmol/L      BUN 11 mg/dL      Creatinine 0 85 mg/dL      Glucose 138 mg/dL      Calcium 9 2 mg/dL      AST 22 U/L      ALT 46 U/L      Alkaline Phosphatase 51 U/L      Total Protein 7 7 g/dL      Albumin 3 4 g/dL      Total Bilirubin 0 40 mg/dL      eGFR 106 ml/min/1 73sq m     Narrative:       [place] guidelines for Chronic Kidney Disease (CKD):     Stage 1 with normal or high GFR (GFR > 90 mL/min/1 73 square meters)    Stage 2 Mild CKD (GFR = 60-89 mL/min/1 73 square meters)    Stage 3A Moderate CKD (GFR = 45-59 mL/min/1 73 square meters)    Stage 3B Moderate CKD (GFR = 30-44 mL/min/1 73 square meters)    Stage 4 Severe CKD (GFR = 15-29 mL/min/1 73 square meters)    Stage 5 End Stage CKD (GFR <15 mL/min/1 73 square meters)  Note: GFR calculation is accurate only with a steady state creatinine    D-Dimer [[id number]]  (Normal) Collected:  09/10/20 0856    Lab Status:  Final result Specimen:  Blood from Arm, Left Updated:  09/10/20 0915     D-Dimer, Quant 0 29 ug/ml FEU     Protime-INR [[id number]]  (Normal) Collected:  09/10/20 0856    Lab Status:  Final result Specimen:  Blood from Arm, Left Updated:  09/10/20 0913     Protime 13 6 seconds      INR 1 06    APTT [[id number]]  (Normal) Collected:  09/10/20 0856    Lab Status:  Final result Specimen:  Blood from Arm, Left Updated:  09/10/20 0913     PTT 29 seconds     [place] draw [[id number]] Collected:  09/10/20 0858    Lab Status:  Final result Specimen:  Blood from Arm, Left Updated:  09/10/20 4575    Narrative: The following orders were created for panel order [place] draw  Procedure                               Abnormality         Status                     ---------                               -----------         ------                     Dequan Hooker Top 7ml on IOLK[[id number]]                         Final result                 Please view results for these tests on the individual orders  CBC and differential [[id number]]  (Abnormal) Collected:  09/10/20 0856    Lab Status:  Final result Specimen:  Blood from Arm, Left Updated:  09/10/20 0904     WBC 7 86 Thousand/uL      RBC 4 92 Million/uL      Hemoglobin 14 6 g/dL      Hematocrit 42 3 %      MCV 86 fL      MCH 29 7 pg      MCHC 34 5 g/dL      RDW 13 5 %      MPV 8 8 fL      Platelets 196 Thousands/uL      nRBC 0 /100 WBCs      Neutrophils Relative 57 %      Immat GRANS % 1 %      Lymphocytes Relative 32 %      Monocytes Relative 6 %      Eosinophils Relative 3 %      Basophils Relative 1 %      Neutrophils Absolute 4 57 Thousands/µL      Immature Grans Absolute 0 04 Thousand/uL      Lymphocytes Absolute 2 54 Thousands/µL      Monocytes Absolute 0 44 Thousand/µL      Eosinophils Absolute 0 22 Thousand/µL      Basophils Absolute 0 05 Thousands/µL                  CTA dissection protocol chest abdomen pelvis w wo contrast   Final Result by Seth Moya MD (09/10 1029)      Normal CT angiogram of the chest, abdomen, and pelvis      Dependent tracheal nodule likely mucus  Hepatic steatosis  Workstation performed: [id number]                    Procedures  ECG 12 Lead Documentation Only    Date/Time: 9/10/2020 8:38 AM  Performed by: Anne Briggs MD  Authorized by: Anne Briggs MD     Indications / Diagnosis:  Cp  ECG reviewed by me, the ED Provider: yes    Patient location:  ED  Previous ECG:     Previous ECG:  Compared to current    Comparison ECG info:  04/20/19 0751    Similarity:  No change  Rate:     ECG rate:  67    ECG rate assessment: normal    Rhythm:     Rhythm: sinus rhythm    QRS:     QRS axis:  Normal  Comments:      No acute ischemic changes             ED Course  ED Course as of Sep 10 1602   Thu Sep 10, 2020   0921 Nitrate did not help cp c/o headache will admin tylenol      1046 Updated patient on lab results CT finding; patient still having constant chest pain will attempt a GI cocktail recommend observation will contact [facility]      1058 Case reviewed with Dr Venus Melvin recommends tele obs              HEART Risk Score      Most Recent Value   Heart Score Risk Calculator   History  1 Filed at: 09/10/2020 0957   ECG  1 Filed at: 09/10/2020 0957   Age  1 Filed at: 09/10/2020 0957   Risk Factors  2 Filed at: 09/10/2020 0957   Troponin  0 Filed at: 09/10/2020 0957   HEART Score  5 Filed at: 09/10/2020 0957                      SBIRT 22yo+      Most Recent Value   SBIRT (25 yo +)   In order to provide better care to our patients, we are screening all of our patients for alcohol and drug use  Would it be okay to ask you these screening questions? Yes Filed at: 09/10/2020 2967   Initial Alcohol Screen: US AUDIT-C    1  How often do you have a drink containing alcohol?  0 Filed at: 09/10/2020 0906   3a  Male UNDER 65: How often do you have five or more drinks on one occasion? 0 Filed at: 09/10/2020 0906   3b  FEMALE Any Age, or MALE 65+: How often do you have 4 or more drinks on one occassion?   0 Filed at: 09/10/2020 0906   Audit-C Score  0 Filed at: 09/10/2020 8243   KATHY: How many times in the past year have you    Used an illegal drug or used a prescription medication for non-medical reasons? Never Filed at: 09/10/2020 0906                  [facility]  Number of Diagnoses or Management Options  Chest pain:   Low magnesium level:   Syncope:   Diagnosis management comments: Mdm:  Chest pain with syncope will re-evaluate for possibility of dissection check for pulmonary embolism acs administer nitrates and re-evaluate  Stress only result from 2/2020 reviewed EKG neg for ischemia did have cp with exertion      Disposition  Final diagnoses:   Chest pain   Syncope   Low magnesium level     Time reflects when diagnosis was documented in both MDM as applicable and the Disposition within this note     Time User Action Codes Description Comment    9/10/2020 10:50 AM Haroldine Amen Add [R07 9] Chest pain     9/10/2020 10:50 AM Haroldine Amen Add [R55] Syncope     9/10/2020 10:50 AM Haroldine Amen Add [R79 0] Low magnesium level       ED Disposition     ED Disposition Condition Date/Time Comment    Admit Stable Thu Sep 10, 2020 11:00 AM Case was discussed with Dr Jose Raul Yeboah  and the patient's admission status was agreed to be Admission Status: observation status to the service of Dr Jose Raul Yeboah           Follow-up Information    None         Current Discharge Medication List      CONTINUE these medications which have NOT CHANGED    Details   aspirin 81 mg chewable tablet Chew 81 mg daily      atorvastatin (LIPITOR) 10 mg tablet take 1 tablet by mouth once daily  Qty: 30 tablet, Refills: 5    Associated Diagnoses: Type 2 diabetes mellitus without complication, without long-term current use of insulin ([facility])      Blood Glucose Monitoring Suppl (ONE TOUCH ULTRA 2) w/Device KIT by Does not apply route 2 (two) times a day  Qty: 1 each, Refills: 0    Associated Diagnoses: Type 2 diabetes mellitus without complication, without long-term current use of insulin ([facility])      divalproex sodium (DEPAKOTE) 500 mg EC tablet Take 500 mg by mouth 2 (two) times a day  glucose blood (OneTouch Ultra) test strip Test sugar BID  Qty: 100 each, Refills: 5    Associated Diagnoses: Type 2 diabetes mellitus without complication, without long-term current use of insulin ([facility])      Lancets (onetouch ultrasoft) lancets Test sugar BID  Qty: 100 each, Refills: 5    Associated Diagnoses: Type 2 diabetes mellitus without complication, without long-term current use of insulin ([facility])      lisinopril (ZESTRIL) 10 mg tablet take 1 tablet by mouth once daily  Qty: 30 tablet, Refills: 5    Associated Diagnoses: Type 2 diabetes mellitus without complication, without long-term current use of insulin ([facility])      metFORMIN (GLUCOPHAGE) 1000 MG tablet take 1 tablet by mouth twice a day  Qty: 60 tablet, Refills: 5    Associated Diagnoses: Type 2 diabetes mellitus without complication, without long-term current use of insulin ([facility])      cholecalciferol (VITAMIN D3) 1,000 units tablet Take 50,000 Units by mouth once a week      risperiDONE (RisperDAL) 2 mg tablet Take 1 tablet (2 mg total) by mouth 2 (two) times a day  Qty: 30 tablet, Refills: 0    Associated Diagnoses: Bipolar 1 disorder ([facility])           No discharge procedures on file      PDMP Review     None          ED Provider  Electronically Signed by           Miah Valencia MD  09/10/20 9697

## 2020-09-10 NOTE — ED NOTES
Pain unchanged with nitro  /70 C/o headache after nitro   [address] CLIFFORD Calero  09/10/20 930 [facility] Lynette Puentes RN  09/10/20 0632

## 2020-09-10 NOTE — ASSESSMENT & PLAN NOTE
EKG without any changes  Admit to telemetry and trend troponin x3  Patient had a negative stress test 7 months ago however no echo was obtained will obtain a 2D echocardiogram  Check lipid panel, A1c reviewed  Check TSH

## 2020-09-10 NOTE — ASSESSMENT & PLAN NOTE
Lab Results   Component Value Date    HGBA1C 7 9 (H) 08/10/2020       No results for input(s): POCGLU in the last 72 hours  Blood Sugar Average: Last 72 hrs:      Moderately well controlled  Hold metformin for 24 hours  Diabetic diet along with insulin sliding scale coverage  Accu-Cheks AC and HS

## 2020-09-11 ENCOUNTER — TRANSITIONAL CARE MANAGEMENT (OUTPATIENT)
Dept: FAMILY MEDICINE CLINIC | Facility: CLINIC | Age: 45
End: 2020-09-11

## 2020-09-11 VITALS
HEART RATE: 71 BPM | DIASTOLIC BLOOD PRESSURE: 78 MMHG | BODY MASS INDEX: 42 KG/M2 | OXYGEN SATURATION: 98 % | HEIGHT: 68 IN | TEMPERATURE: 98 F | SYSTOLIC BLOOD PRESSURE: 127 MMHG | RESPIRATION RATE: 18 BRPM | WEIGHT: 277.12 LBS

## 2020-09-11 LAB
ATRIAL RATE: 79 BPM
CHOLEST SERPL-MCNC: 119 MG/DL (ref 50–200)
GLUCOSE SERPL-MCNC: 126 MG/DL (ref 65–140)
HDLC SERPL-MCNC: 27 MG/DL
LDLC SERPL CALC-MCNC: 57 MG/DL (ref 0–100)
P AXIS: 26 DEGREES
PR INTERVAL: 136 MS
QRS AXIS: 3 DEGREES
QRSD INTERVAL: 84 MS
QT INTERVAL: 378 MS
QTC INTERVAL: 433 MS
T WAVE AXIS: 27 DEGREES
TRIGL SERPL-MCNC: 176 MG/DL
TSH SERPL DL<=0.05 MIU/L-ACNC: 3.38 UIU/ML (ref 0.36–3.74)
VENTRICULAR RATE: 79 BPM

## 2020-09-11 PROCEDURE — 99217 PR OBSERVATION CARE DISCHARGE MANAGEMENT: CPT | Performed by: FAMILY MEDICINE

## 2020-09-11 PROCEDURE — 82948 REAGENT STRIP/BLOOD GLUCOSE: CPT

## 2020-09-11 PROCEDURE — 93010 ELECTROCARDIOGRAM REPORT: CPT | Performed by: INTERNAL MEDICINE

## 2020-09-11 PROCEDURE — 80061 LIPID PANEL: CPT | Performed by: FAMILY MEDICINE

## 2020-09-11 PROCEDURE — 84443 ASSAY THYROID STIM HORMONE: CPT | Performed by: FAMILY MEDICINE

## 2020-09-11 NOTE — CASE MANAGEMENT
Pt is being dc'd home on this date  CM in [facility] re: an appointment and CM also in [facility] messaged pt's PCP office:Dr Ray Valles re: a sooner appt as pt has an appt but not until November

## 2020-09-11 NOTE — PLAN OF CARE
Problem: Potential for Falls  Goal: Patient will remain free of falls  Description: INTERVENTIONS:  - Assess patient frequently for physical needs  -  Identify cognitive and physical deficits and behaviors that affect risk of falls    -  [place] fall precautions as indicated by assessment   - Educate patient/family on patient safety including physical limitations  - Instruct patient to call for assistance with activity based on assessment  - Modify environment to reduce risk of injury  - Consider OT/PT consult to assist with strengthening/mobility  Outcome: Progressing     Problem: PAIN - ADULT  Goal: Verbalizes/displays adequate comfort level or baseline comfort level  Description: Interventions:  - Encourage patient to monitor pain and request assistance  - Assess pain using appropriate pain scale  - Administer analgesics based on type and severity of pain and evaluate response  - Implement non-pharmacological measures as appropriate and evaluate response  - Consider cultural and social influences on pain and pain management  - Notify physician/advanced practitioner if interventions unsuccessful or patient reports new pain  Outcome: Progressing     Problem: INFECTION - ADULT  Goal: Absence or prevention of progression during hospitalization  Description: INTERVENTIONS:  - Assess and monitor for signs and symptoms of infection  - Monitor lab/diagnostic results  - Monitor all insertion sites, i e  indwelling lines, tubes, and drains  - Monitor endotracheal if appropriate and nasal secretions for changes in amount and color  - [place] appropriate cooling/warming therapies per order  - Administer medications as ordered  - Instruct and encourage patient and family to use good hand hygiene technique  - Identify and instruct in appropriate isolation precautions for identified infection/condition  Outcome: Progressing  Goal: Absence of fever/infection during neutropenic period  Description: INTERVENTIONS:  - Monitor WBC    Outcome: Progressing     Problem: SAFETY ADULT  Goal: Patient will remain free of falls  Description: INTERVENTIONS:  - Assess patient frequently for physical needs  -  Identify cognitive and physical deficits and behaviors that affect risk of falls    -  [place] fall precautions as indicated by assessment   - Educate patient/family on patient safety including physical limitations  - Instruct patient to call for assistance with activity based on assessment  - Modify environment to reduce risk of injury  - Consider OT/PT consult to assist with strengthening/mobility  Outcome: Progressing  Goal: Maintain or return to baseline ADL function  Description: INTERVENTIONS:  -  Assess patient's ability to carry out ADLs; assess patient's baseline for ADL function and identify physical deficits which impact ability to perform ADLs (bathing, care of mouth/teeth, toileting, grooming, dressing, etc )  - Assess/evaluate cause of self-care deficits   - Assess range of motion  - Assess patient's mobility; develop plan if impaired  - Assess patient's need for assistive devices and provide as appropriate  - Encourage maximum independence but intervene and supervise when necessary  - Involve family in performance of ADLs  - Assess for home care needs following discharge   - Consider OT consult to assist with ADL evaluation and planning for discharge  - Provide patient education as appropriate  Outcome: Progressing  Goal: Maintain or return mobility status to optimal level  Description: INTERVENTIONS:  - Assess patient's baseline mobility status (ambulation, transfers, stairs, etc )    - Identify cognitive and physical deficits and behaviors that affect mobility  - Identify mobility aids required to assist with transfers and/or ambulation (gait belt, sit-to-stand, lift, walker, cane, etc )  - [place] fall precautions as indicated by assessment  - Record patient progress and toleration of activity level on Mobility SBAR; progress patient to next Phase/Stage  - Instruct patient to call for assistance with activity based on assessment  - Consider rehabilitation consult to assist with strengthening/weightbearing, etc   Outcome: Progressing     Problem: DISCHARGE PLANNING  Goal: Discharge to home or other facility with appropriate resources  Description: INTERVENTIONS:  - Identify barriers to discharge w/patient and caregiver  - Arrange for needed discharge resources and transportation as appropriate  - Identify discharge learning needs (meds, wound care, etc )  - Arrange for interpretive services to assist at discharge as needed  - Refer to Case Management Department for coordinating discharge planning if the patient needs post-hospital services based on physician/advanced practitioner order or complex needs related to functional status, cognitive ability, or social support system  Outcome: Progressing     Problem: Knowledge Deficit  Goal: Patient/family/caregiver demonstrates understanding of disease process, treatment plan, medications, and discharge instructions  Description: Complete learning assessment and assess knowledge base    Interventions:  - Provide teaching at level of understanding  - Provide teaching via preferred learning methods  Outcome: Progressing     Problem: CARDIOVASCULAR - ADULT  Goal: Maintains optimal cardiac output and hemodynamic stability  Description: INTERVENTIONS:  - Monitor I/O, vital signs and rhythm  - Monitor for S/S and trends of decreased cardiac output  - Administer and titrate ordered vasoactive medications to optimize hemodynamic stability  - Assess quality of pulses, skin color and temperature  - Assess for signs of decreased coronary artery perfusion  - Instruct patient to report change in severity of symptoms  Outcome: Progressing  Goal: Absence of cardiac dysrhythmias or at baseline rhythm  Description: INTERVENTIONS:  - Continuous cardiac monitoring, vital signs, obtain 12 lead EKG if ordered  - Administer antiarrhythmic and heart rate control medications as ordered  - Monitor electrolytes and administer replacement therapy as ordered  Outcome: Progressing     Problem: METABOLIC, FLUID AND ELECTROLYTES - ADULT  Goal: Glucose maintained within target range  Description: INTERVENTIONS:  - Monitor Blood Glucose as ordered  - Assess for signs and symptoms of hyperglycemia and hypoglycemia  - Administer ordered medications to maintain glucose within target range  - Assess nutritional intake and initiate nutrition service referral as needed  Outcome: Progressing

## 2020-09-11 NOTE — DISCHARGE SUMMARY
Discharge Summary - Loyda Orellana 40 y o  male MRN: [id number]    Unit/Bed#: 397-02 Encounter: [id number]    Admission Date:   Admission Orders (From admission, onward)     Ordered        09/10/20 1059  Place in Observation (expected length of stay for this patient is less than two midnights)  Once         09/10/20 1101  Place in Observation  Once                     Admitting Diagnosis: Syncope [R55]  Chest pain [R07 9]  Low magnesium level [R79 0]    HPI: The patient is a pleasant 28-year-old male with past medical history significant for type 2 diabetes and hypertension presents to the ED today with a chief complaint of chest pain  Patient is known to have chronic chest pain but states this time was different  He moved his bowels and was at the sink when he developed midsternal chest pain, without radiation, shortness of breath or diaphoresis  He believes he syncopized but he did not fall down, he was able to hold himself up against the sink  The chest pain persisted and patient received nitroglycerin in the ED, nitroglycerin did not change the intensity of the pain  Chest pain is not associated with any nausea or vomiting  All the patient does complain of some bloating and some typical reflux symptoms  Procedures Performed:   Orders Placed This Encounter   Procedures    ED ECG Documentation Only       Summary of Hospital Course:     Atypical chest pain    Patient presents emergency room with persistent chest pain, that was different in nature than his chronic chest pain  Chest pain was atypical in nature, however he was recommended for observation  He was admitted to telemetry unit and serial troponins were trended  Patient had an exercise test a few months ago, but no echo was obtained  An echocardiogram was obtained during this admission which was essentially unremarkable    Given his EKG was unchanged, troponins were negative, chest pain was somewhat reproducible and had no typical features he will be discharged home with outpatient follow-up with his PCP and cardiologist     Significant Findings, Care, Treatment and Services Provided:     CTA     Normal CT angiogram of the chest, abdomen, and pelvis     Dependent tracheal nodule likely mucus  Hepatic steatosis  Complications: none    Discharge Diagnosis: see above    Resolved Problems  Date Reviewed: 8/10/2020    None          Condition at Discharge: good         Discharge instructions/Information to patient and family:   See after visit summary for information provided to patient and family  Provisions for Follow-Up Care:  See after visit summary for information related to follow-up care and any pertinent home health orders  PCP: Lisbet Sosa DO    Disposition: Home    Planned Readmission: No      Discharge Statement   I spent 22 minutes discharging the patient  This time was spent on the day of discharge  I had direct contact with the patient on the day of discharge  Additional documentation is required if more than 30 minutes were spent on discharge  Discharge Medications:  See after visit summary for reconciled discharge medications provided to patient and family

## 2020-09-17 ENCOUNTER — TELEPHONE (OUTPATIENT)
Dept: PAIN MEDICINE | Facility: CLINIC | Age: 45
End: 2020-09-17

## 2020-11-29 DIAGNOSIS — E11.9 TYPE 2 DIABETES MELLITUS WITHOUT COMPLICATION, WITHOUT LONG-TERM CURRENT USE OF INSULIN (HCC): ICD-10-CM

## 2020-11-29 RX ORDER — ATORVASTATIN CALCIUM 10 MG/1
TABLET, FILM COATED ORAL
Qty: 30 TABLET | Refills: 5 | Status: SHIPPED | OUTPATIENT
Start: 2020-11-29 | End: 2021-06-09

## 2020-12-20 ENCOUNTER — HOSPITAL ENCOUNTER (EMERGENCY)
Facility: HOSPITAL | Age: 45
Discharge: HOME | End: 2020-12-20
Attending: EMERGENCY MEDICINE
Payer: COMMERCIAL

## 2020-12-20 ENCOUNTER — APPOINTMENT (EMERGENCY)
Dept: RADIOLOGY | Facility: HOSPITAL | Age: 45
End: 2020-12-20
Payer: COMMERCIAL

## 2020-12-20 VITALS
OXYGEN SATURATION: 96 % | RESPIRATION RATE: 20 BRPM | TEMPERATURE: 97.4 F | HEART RATE: 93 BPM | SYSTOLIC BLOOD PRESSURE: 151 MMHG | DIASTOLIC BLOOD PRESSURE: 90 MMHG

## 2020-12-20 DIAGNOSIS — R53.1 GENERALIZED WEAKNESS: Primary | ICD-10-CM

## 2020-12-20 DIAGNOSIS — R06.02 SHORTNESS OF BREATH: ICD-10-CM

## 2020-12-20 LAB
ALBUMIN SERPL BCP-MCNC: 3.6 G/DL (ref 3.5–5)
ALP SERPL-CCNC: 62 U/L (ref 46–116)
ALT SERPL W P-5'-P-CCNC: 54 U/L (ref 12–78)
ANION GAP SERPL CALCULATED.3IONS-SCNC: 9 MMOL/L (ref 4–13)
AST SERPL W P-5'-P-CCNC: 25 U/L (ref 5–45)
BASOPHILS # BLD AUTO: 0.03 THOUSANDS/ΜL (ref 0–0.1)
BASOPHILS NFR BLD AUTO: 0 % (ref 0–1)
BILIRUB SERPL-MCNC: 0.3 MG/DL (ref 0.2–1)
BUN SERPL-MCNC: 14 MG/DL (ref 5–25)
CALCIUM SERPL-MCNC: 9.2 MG/DL (ref 8.3–10.1)
CHLORIDE SERPL-SCNC: 98 MMOL/L (ref 100–108)
CK SERPL-CCNC: 62 U/L (ref 39–308)
CO2 SERPL-SCNC: 28 MMOL/L (ref 21–32)
CREAT SERPL-MCNC: 1 MG/DL (ref 0.6–1.3)
CRP SERPL QL: 11 MG/L
D DIMER PPP FEU-MCNC: 0.37 UG/ML FEU
EOSINOPHIL # BLD AUTO: 0.24 THOUSAND/ΜL (ref 0–0.61)
EOSINOPHIL NFR BLD AUTO: 3 % (ref 0–6)
ERYTHROCYTE [DISTWIDTH] IN BLOOD BY AUTOMATED COUNT: 13.2 % (ref 11.6–15.1)
FLUAV RNA RESP QL NAA+PROBE: NEGATIVE
FLUBV RNA RESP QL NAA+PROBE: NEGATIVE
GFR SERPL CREATININE-BSD FRML MDRD: 90 ML/MIN/1.73SQ M
GLUCOSE SERPL-MCNC: 150 MG/DL (ref 65–140)
HCT VFR BLD AUTO: 41.4 % (ref 36.5–49.3)
HGB BLD-MCNC: 14 G/DL (ref 12–17)
IMM GRANULOCYTES # BLD AUTO: 0.04 THOUSAND/UL (ref 0–0.2)
IMM GRANULOCYTES NFR BLD AUTO: 1 % (ref 0–2)
LYMPHOCYTES # BLD AUTO: 2.57 THOUSANDS/ΜL (ref 0.6–4.47)
LYMPHOCYTES NFR BLD AUTO: 29 % (ref 14–44)
MCH RBC QN AUTO: 29 PG (ref 26.8–34.3)
MCHC RBC AUTO-ENTMCNC: 33.8 G/DL (ref 31.4–37.4)
MCV RBC AUTO: 86 FL (ref 82–98)
MONOCYTES # BLD AUTO: 0.45 THOUSAND/ΜL (ref 0.17–1.22)
MONOCYTES NFR BLD AUTO: 5 % (ref 4–12)
NEUTROPHILS # BLD AUTO: 5.41 THOUSANDS/ΜL (ref 1.85–7.62)
NEUTS SEG NFR BLD AUTO: 62 % (ref 43–75)
NRBC BLD AUTO-RTO: 0 /100 WBCS
NT-PROBNP SERPL-MCNC: 73 PG/ML
PLATELET # BLD AUTO: 216 THOUSANDS/UL (ref 149–390)
PMV BLD AUTO: 9 FL (ref 8.9–12.7)
POTASSIUM SERPL-SCNC: 4.4 MMOL/L (ref 3.5–5.3)
PROT SERPL-MCNC: 7.9 G/DL (ref 6.4–8.2)
RBC # BLD AUTO: 4.83 MILLION/UL (ref 3.88–5.62)
RSV RNA RESP QL NAA+PROBE: NEGATIVE
SARS-COV-2 RNA RESP QL NAA+PROBE: NEGATIVE
SODIUM SERPL-SCNC: 135 MMOL/L (ref 136–145)
TROPONIN I SERPL-MCNC: <0.02 NG/ML
WBC # BLD AUTO: 8.74 THOUSAND/UL (ref 4.31–10.16)

## 2020-12-20 PROCEDURE — 85379 FIBRIN DEGRADATION QUANT: CPT | Performed by: EMERGENCY MEDICINE

## 2020-12-20 PROCEDURE — 84145 PROCALCITONIN (PCT): CPT | Performed by: EMERGENCY MEDICINE

## 2020-12-20 PROCEDURE — 82550 ASSAY OF CK (CPK): CPT | Performed by: EMERGENCY MEDICINE

## 2020-12-20 PROCEDURE — 83880 ASSAY OF NATRIURETIC PEPTIDE: CPT | Performed by: EMERGENCY MEDICINE

## 2020-12-20 PROCEDURE — 71045 X-RAY EXAM CHEST 1 VIEW: CPT

## 2020-12-20 PROCEDURE — 93005 ELECTROCARDIOGRAM TRACING: CPT

## 2020-12-20 PROCEDURE — 80053 COMPREHEN METABOLIC PANEL: CPT | Performed by: EMERGENCY MEDICINE

## 2020-12-20 PROCEDURE — 0241U HB NFCT DS VIR RESP RNA 4 TRGT: CPT | Performed by: EMERGENCY MEDICINE

## 2020-12-20 PROCEDURE — 82728 ASSAY OF FERRITIN: CPT | Performed by: EMERGENCY MEDICINE

## 2020-12-20 PROCEDURE — 85025 COMPLETE CBC W/AUTO DIFF WBC: CPT | Performed by: EMERGENCY MEDICINE

## 2020-12-20 PROCEDURE — 86140 C-REACTIVE PROTEIN: CPT | Performed by: EMERGENCY MEDICINE

## 2020-12-20 PROCEDURE — 84484 ASSAY OF TROPONIN QUANT: CPT | Performed by: EMERGENCY MEDICINE

## 2020-12-20 PROCEDURE — 36415 COLL VENOUS BLD VENIPUNCTURE: CPT | Performed by: EMERGENCY MEDICINE

## 2020-12-20 PROCEDURE — 99284 EMERGENCY DEPT VISIT MOD MDM: CPT | Performed by: EMERGENCY MEDICINE

## 2020-12-20 PROCEDURE — 99285 EMERGENCY DEPT VISIT HI MDM: CPT

## 2020-12-21 LAB
ATRIAL RATE: 74 BPM
FERRITIN SERPL-MCNC: 168 NG/ML (ref 8–388)
P AXIS: 18 DEGREES
PR INTERVAL: 124 MS
PROCALCITONIN SERPL-MCNC: <0.05 NG/ML
QRS AXIS: 5 DEGREES
QRSD INTERVAL: 82 MS
QT INTERVAL: 368 MS
QTC INTERVAL: 408 MS
T WAVE AXIS: 32 DEGREES
VENTRICULAR RATE: 74 BPM

## 2020-12-21 PROCEDURE — 93010 ELECTROCARDIOGRAM REPORT: CPT | Performed by: INTERNAL MEDICINE

## 2021-03-03 DIAGNOSIS — E11.9 TYPE 2 DIABETES MELLITUS WITHOUT COMPLICATION, WITHOUT LONG-TERM CURRENT USE OF INSULIN (HCC): ICD-10-CM

## 2021-03-03 PROCEDURE — 4010F ACE/ARB THERAPY RXD/TAKEN: CPT | Performed by: FAMILY MEDICINE

## 2021-03-03 RX ORDER — LISINOPRIL 10 MG/1
TABLET ORAL
Qty: 30 TABLET | Refills: 5 | Status: SHIPPED | OUTPATIENT
Start: 2021-03-03 | End: 2021-09-13

## 2021-03-19 ENCOUNTER — OFFICE VISIT (OUTPATIENT)
Dept: FAMILY MEDICINE CLINIC | Facility: CLINIC | Age: 46
End: 2021-03-19
Payer: COMMERCIAL

## 2021-03-19 VITALS
WEIGHT: 280.2 LBS | DIASTOLIC BLOOD PRESSURE: 70 MMHG | BODY MASS INDEX: 42.47 KG/M2 | SYSTOLIC BLOOD PRESSURE: 128 MMHG | HEIGHT: 68 IN | TEMPERATURE: 97.2 F

## 2021-03-19 DIAGNOSIS — E78.2 MIXED DYSLIPIDEMIA: ICD-10-CM

## 2021-03-19 DIAGNOSIS — E11.9 TYPE 2 DIABETES MELLITUS WITHOUT COMPLICATION, WITHOUT LONG-TERM CURRENT USE OF INSULIN (HCC): Primary | ICD-10-CM

## 2021-03-19 DIAGNOSIS — E66.01 MORBID OBESITY WITH BMI OF 40.0-44.9, ADULT (HCC): ICD-10-CM

## 2021-03-19 LAB
LEFT EYE DIABETIC RETINOPATHY: NORMAL
LEFT EYE IMAGE QUALITY: NORMAL
LEFT EYE MACULAR EDEMA: NORMAL
LEFT EYE OTHER RETINOPATHY: NORMAL
RIGHT EYE DIABETIC RETINOPATHY: NORMAL
RIGHT EYE IMAGE QUALITY: NORMAL
RIGHT EYE MACULAR EDEMA: NORMAL
RIGHT EYE OTHER RETINOPATHY: NORMAL
SEVERITY (EYE EXAM): NORMAL
SL AMB POCT HEMOGLOBIN AIC: 8.3 (ref ?–6.5)

## 2021-03-19 PROCEDURE — 3052F HG A1C>EQUAL 8.0%<EQUAL 9.0%: CPT | Performed by: FAMILY MEDICINE

## 2021-03-19 PROCEDURE — 99214 OFFICE O/P EST MOD 30 MIN: CPT | Performed by: FAMILY MEDICINE

## 2021-03-19 PROCEDURE — 83036 HEMOGLOBIN GLYCOSYLATED A1C: CPT | Performed by: FAMILY MEDICINE

## 2021-03-19 PROCEDURE — 3008F BODY MASS INDEX DOCD: CPT | Performed by: FAMILY MEDICINE

## 2021-03-19 PROCEDURE — 92250 FUNDUS PHOTOGRAPHY W/I&R: CPT | Performed by: FAMILY MEDICINE

## 2021-03-19 PROCEDURE — 2025F 7 FLD RTA PHOTO W/O RTNOPTHY: CPT | Performed by: FAMILY MEDICINE

## 2021-03-19 PROCEDURE — 1036F TOBACCO NON-USER: CPT | Performed by: FAMILY MEDICINE

## 2021-03-19 RX ORDER — EMPAGLIFLOZIN 10 MG/1
10 TABLET, FILM COATED ORAL EVERY MORNING
Qty: 30 TABLET | Refills: 5 | Status: SHIPPED | OUTPATIENT
Start: 2021-03-19 | End: 2021-08-06

## 2021-03-19 NOTE — PROGRESS NOTES
Assessment/Plan: For his diabetes, we will add Jardiance 10 milligrams a day  I told him to make sure he is drinking enough fluids  He will continue to watch his diet  We will see him back in 3 months or p r n  Problem List Items Addressed This Visit        Endocrine    Type 2 diabetes mellitus without complication, without long-term current use of insulin ([facility] ) - Primary    Relevant Medications    Empagliflozin (Jardiance) 10 MG TABS    Other Relevant Orders    IRIS Diabetic eye exam    POCT hemoglobin A1c (Completed)      Other Visit Diagnoses     Morbid obesity with BMI of 40 0-44 9, adult ([facility] )               Diagnoses and all orders for this visit:    Type 2 diabetes mellitus without complication, without long-term current use of insulin ([facility])  -     IRIS Diabetic eye exam  -     POCT hemoglobin A1c  -     Empagliflozin (Jardiance) 10 MG TABS; Take 1 tablet (10 mg total) by mouth every morning    Morbid obesity with BMI of 40 0-44 9, adult ([facility] )        No problem-specific Assessment & Plan notes found for this encounter  Subjective:      Patient ID: Deyanira Lindquist is a 39 y o  male  Patient here today for follow-up  Patient denies any chest pain or shortness of breath  Patient has been trying to watch his diet more  Decreased soda intake  Diabetes  He presents for his follow-up diabetic visit  He has type 2 diabetes mellitus  His disease course has been stable  There are no hypoglycemic associated symptoms  There are no diabetic associated symptoms  There are no hypoglycemic complications  There are no diabetic complications  Risk factors for coronary artery disease include diabetes mellitus, dyslipidemia and male sex  Current diabetic treatment includes oral agent (monotherapy)  He is compliant with treatment all of the time  His weight is stable  He is following a generally healthy diet  When asked about meal planning, he reported none  He has not had a previous visit with a dietitian  He rarely participates in exercise  His home blood glucose trend is increasing steadily  An ACE inhibitor/angiotensin II receptor blocker is being taken  Hyperlipidemia  This is a chronic problem  The problem is controlled  Current antihyperlipidemic treatment includes statins  The current treatment provides significant improvement of lipids  There are no compliance problems  Risk factors for coronary artery disease include diabetes mellitus, dyslipidemia and male sex  The following portions of the patient's history were reviewed and updated as appropriate:   He has a past medical history of Allergic rhinitis, Asthma, Bipolar 1 disorder ([facility] ), Chronic kidney disease, Diabetes mellitus ([facility] ), Hematuria, Hyperlipidemia, Leukocytosis, Diana-Hussein tear, Pulmonary emboli ([facility] ), Seizures ([facility] ), and Sleep apnea  ,  does not have any pertinent problems on file  ,   has a past surgical history that includes Nasal septum surgery; Angioplasty; and Cardiac surgery  ,  family history includes Asthma in his father; Cancer in his mother and paternal grandfather; Colon cancer in his family; Diabetes in his mother; Hypertension in his mother  ,   reports that he has never smoked  He has quit using smokeless tobacco   His smokeless tobacco use included chew  He reports that he does not drink alcohol or use drugs  ,  is allergic to epinephrine; iodine; other; and shellfish-derived products     Current Outpatient Medications   Medication Sig Dispense Refill    aspirin 81 mg chewable tablet Chew 81 mg daily      atorvastatin (LIPITOR) 10 mg tablet take 1 tablet by mouth once daily 30 tablet 5    Blood Glucose Monitoring Suppl (ONE TOUCH ULTRA 2) w/Device KIT by Does not apply route 2 (two) times a day 1 each 0    divalproex sodium (DEPAKOTE) 500 mg EC tablet Take 500 mg by mouth 2 (two) times a day        glucose blood (OneTouch Ultra) test strip Test sugar  each 5    Lancets (onetouch ultrasoft) lancets Test sugar BID 100 each 5    lisinopril (ZESTRIL) 10 mg tablet take 1 tablet by mouth once daily 30 tablet 5    metFORMIN (GLUCOPHAGE) 1000 MG tablet take 1 tablet by mouth twice a day 60 tablet 5    risperiDONE (RisperDAL) 2 mg tablet Take 1 tablet (2 mg total) by mouth 2 (two) times a day 30 tablet 0    Empagliflozin (Jardiance) 10 MG TABS Take 1 tablet (10 mg total) by mouth every morning 30 tablet 5     No current facility-administered medications for this visit  Review of Systems   Constitutional: Negative  Respiratory: Negative  Cardiovascular: Negative  Gastrointestinal: Negative  Genitourinary: Negative  Objective:  Vitals:    03/19/21 1315   BP: 128/70   Temp: (!) 97 2 °F (36 2 °C)   Weight: 127 kg (280 lb 3 2 oz)   Height: 5' 8" (1 727 m)     Body mass index is 42 6 kg/m²  Physical Exam  Vitals signs reviewed  Constitutional:       General: He is not in acute distress  Appearance: Normal appearance  He is well-developed  He is not diaphoretic  HENT:      Head: Normocephalic and atraumatic  Eyes:      Conjunctiva/sclera: Conjunctivae normal    Cardiovascular:      Rate and Rhythm: Normal rate and regular rhythm  Pulses: no weak pulses          Dorsalis pedis pulses are 2+ on the right side and 2+ on the left side  Posterior tibial pulses are 2+ on the right side and 2+ on the left side  Heart sounds: Normal heart sounds  No murmur  No friction rub  No gallop  Pulmonary:      Effort: Pulmonary effort is normal  No respiratory distress  Breath sounds: Normal breath sounds  No wheezing or rales  Musculoskeletal:      Right lower leg: No edema  Left lower leg: No edema  Feet:      Right foot:      Skin integrity: No ulcer, skin breakdown, erythema, warmth, callus or dry skin  Left foot:      Skin integrity: No ulcer, skin breakdown, erythema, warmth, callus or dry skin  Neurological:      General: No focal deficit present        Mental Status: He is alert and oriented to person, place, and time  Psychiatric:         Mood and Affect: Mood normal          Behavior: Behavior normal          Thought Content: Thought content normal          Judgment: Judgment normal        Patient's shoes and socks removed  Right Foot/Ankle   Right Foot Inspection  Skin Exam: skin normal and skin intact no dry skin, no warmth, no callus, no erythema, no maceration, no abnormal color, no pre-ulcer, no ulcer and no callus                            Sensory       Monofilament testing: intact  Vascular    The right DP pulse is 2+  The right PT pulse is 2+  Left Foot/Ankle  Left Foot Inspection  Skin Exam: skin normal and skin intactno dry skin, no warmth, no erythema, no maceration, normal color, no pre-ulcer, no ulcer and no callus                                         Sensory       Monofilament: intact  Vascular    The left DP pulse is 2+  The left PT pulse is 2+  Assign Risk Category:  No deformity present; No loss of protective sensation; No weak pulses       Risk: 0    BMI Counseling: Body mass index is 42 6 kg/m²  The BMI is above normal  Nutrition recommendations include reducing portion sizes, decreasing overall calorie intake, 3-5 servings of fruits/vegetables daily, reducing fast food intake, consuming healthier snacks, decreasing soda and/or juice intake, moderation in carbohydrate intake, increasing intake of lean protein, reducing intake of saturated fat and trans fat and reducing intake of cholesterol

## 2021-03-24 ENCOUNTER — IMMUNIZATIONS (OUTPATIENT)
Dept: FAMILY MEDICINE CLINIC | Facility: HOSPITAL | Age: 46
End: 2021-03-24

## 2021-03-24 DIAGNOSIS — Z23 ENCOUNTER FOR IMMUNIZATION: Primary | ICD-10-CM

## 2021-03-24 PROCEDURE — 0011A SARS-COV-2 / COVID-19 MRNA VACCINE (MODERNA) 100 MCG: CPT

## 2021-03-24 PROCEDURE — 91301 SARS-COV-2 / COVID-19 MRNA VACCINE (MODERNA) 100 MCG: CPT

## 2021-04-21 ENCOUNTER — IMMUNIZATIONS (OUTPATIENT)
Dept: FAMILY MEDICINE CLINIC | Facility: HOSPITAL | Age: 46
End: 2021-04-21

## 2021-04-21 ENCOUNTER — HOSPITAL ENCOUNTER (EMERGENCY)
Facility: HOSPITAL | Age: 46
Discharge: HOME | End: 2021-04-21
Attending: EMERGENCY MEDICINE
Payer: COMMERCIAL

## 2021-04-21 VITALS
DIASTOLIC BLOOD PRESSURE: 77 MMHG | BODY MASS INDEX: 42.4 KG/M2 | WEIGHT: 278.88 LBS | RESPIRATION RATE: 18 BRPM | HEART RATE: 78 BPM | TEMPERATURE: 97.5 F | SYSTOLIC BLOOD PRESSURE: 118 MMHG | OXYGEN SATURATION: 94 %

## 2021-04-21 DIAGNOSIS — R06.02 SOB (SHORTNESS OF BREATH): Primary | ICD-10-CM

## 2021-04-21 DIAGNOSIS — Z23 ENCOUNTER FOR IMMUNIZATION: Primary | ICD-10-CM

## 2021-04-21 LAB
ALBUMIN SERPL BCP-MCNC: 3.6 G/DL (ref 3.5–5)
ALP SERPL-CCNC: 56 U/L (ref 46–116)
ALT SERPL W P-5'-P-CCNC: 81 U/L (ref 12–78)
ANION GAP SERPL CALCULATED.3IONS-SCNC: 7 MMOL/L (ref 4–13)
AST SERPL W P-5'-P-CCNC: 44 U/L (ref 5–45)
BASOPHILS # BLD AUTO: 0.04 THOUSANDS/ΜL (ref 0–0.1)
BASOPHILS NFR BLD AUTO: 1 % (ref 0–1)
BILIRUB SERPL-MCNC: 0.28 MG/DL (ref 0.2–1)
BUN SERPL-MCNC: 16 MG/DL (ref 5–25)
CALCIUM SERPL-MCNC: 9.3 MG/DL (ref 8.3–10.1)
CHLORIDE SERPL-SCNC: 101 MMOL/L (ref 100–108)
CO2 SERPL-SCNC: 29 MMOL/L (ref 21–32)
CREAT SERPL-MCNC: 1 MG/DL (ref 0.6–1.3)
D DIMER PPP FEU-MCNC: 0.38 UG/ML FEU
EOSINOPHIL # BLD AUTO: 0.27 THOUSAND/ΜL (ref 0–0.61)
EOSINOPHIL NFR BLD AUTO: 3 % (ref 0–6)
ERYTHROCYTE [DISTWIDTH] IN BLOOD BY AUTOMATED COUNT: 13.4 % (ref 11.6–15.1)
GFR SERPL CREATININE-BSD FRML MDRD: 90 ML/MIN/1.73SQ M
GLUCOSE SERPL-MCNC: 201 MG/DL (ref 65–140)
HCT VFR BLD AUTO: 44.6 % (ref 36.5–49.3)
HGB BLD-MCNC: 15.3 G/DL (ref 12–17)
IMM GRANULOCYTES # BLD AUTO: 0.04 THOUSAND/UL (ref 0–0.2)
IMM GRANULOCYTES NFR BLD AUTO: 1 % (ref 0–2)
LYMPHOCYTES # BLD AUTO: 2.32 THOUSANDS/ΜL (ref 0.6–4.47)
LYMPHOCYTES NFR BLD AUTO: 28 % (ref 14–44)
MCH RBC QN AUTO: 29.6 PG (ref 26.8–34.3)
MCHC RBC AUTO-ENTMCNC: 34.3 G/DL (ref 31.4–37.4)
MCV RBC AUTO: 86 FL (ref 82–98)
MONOCYTES # BLD AUTO: 0.48 THOUSAND/ΜL (ref 0.17–1.22)
MONOCYTES NFR BLD AUTO: 6 % (ref 4–12)
NEUTROPHILS # BLD AUTO: 5.17 THOUSANDS/ΜL (ref 1.85–7.62)
NEUTS SEG NFR BLD AUTO: 61 % (ref 43–75)
NRBC BLD AUTO-RTO: 0 /100 WBCS
PLATELET # BLD AUTO: 247 THOUSANDS/UL (ref 149–390)
PMV BLD AUTO: 8.9 FL (ref 8.9–12.7)
POTASSIUM SERPL-SCNC: 4.3 MMOL/L (ref 3.5–5.3)
PROT SERPL-MCNC: 8.2 G/DL (ref 6.4–8.2)
RBC # BLD AUTO: 5.17 MILLION/UL (ref 3.88–5.62)
SODIUM SERPL-SCNC: 137 MMOL/L (ref 136–145)
TROPONIN I SERPL-MCNC: <0.02 NG/ML
WBC # BLD AUTO: 8.32 THOUSAND/UL (ref 4.31–10.16)

## 2021-04-21 PROCEDURE — 80053 COMPREHEN METABOLIC PANEL: CPT | Performed by: EMERGENCY MEDICINE

## 2021-04-21 PROCEDURE — 85379 FIBRIN DEGRADATION QUANT: CPT | Performed by: EMERGENCY MEDICINE

## 2021-04-21 PROCEDURE — 0012A SARS-COV-2 / COVID-19 MRNA VACCINE (MODERNA) 100 MCG: CPT

## 2021-04-21 PROCEDURE — 85025 COMPLETE CBC W/AUTO DIFF WBC: CPT | Performed by: EMERGENCY MEDICINE

## 2021-04-21 PROCEDURE — 84484 ASSAY OF TROPONIN QUANT: CPT | Performed by: EMERGENCY MEDICINE

## 2021-04-21 PROCEDURE — 99284 EMERGENCY DEPT VISIT MOD MDM: CPT

## 2021-04-21 PROCEDURE — 96375 TX/PRO/DX INJ NEW DRUG ADDON: CPT

## 2021-04-21 PROCEDURE — 91301 SARS-COV-2 / COVID-19 MRNA VACCINE (MODERNA) 100 MCG: CPT

## 2021-04-21 PROCEDURE — 96374 THER/PROPH/DIAG INJ IV PUSH: CPT

## 2021-04-21 PROCEDURE — 36415 COLL VENOUS BLD VENIPUNCTURE: CPT | Performed by: EMERGENCY MEDICINE

## 2021-04-21 PROCEDURE — 93005 ELECTROCARDIOGRAM TRACING: CPT

## 2021-04-21 PROCEDURE — 99284 EMERGENCY DEPT VISIT MOD MDM: CPT | Performed by: EMERGENCY MEDICINE

## 2021-04-21 RX ORDER — DIPHENHYDRAMINE HYDROCHLORIDE 50 MG/ML
50 INJECTION INTRAMUSCULAR; INTRAVENOUS ONCE
Status: COMPLETED | OUTPATIENT
Start: 2021-04-21 | End: 2021-04-21

## 2021-04-21 RX ORDER — METHYLPREDNISOLONE SODIUM SUCCINATE 125 MG/2ML
80 INJECTION, POWDER, LYOPHILIZED, FOR SOLUTION INTRAMUSCULAR; INTRAVENOUS ONCE
Status: COMPLETED | OUTPATIENT
Start: 2021-04-21 | End: 2021-04-21

## 2021-04-21 RX ADMIN — FAMOTIDINE 20 MG: 10 INJECTION INTRAVENOUS at 15:43

## 2021-04-21 RX ADMIN — METHYLPREDNISOLONE SODIUM SUCCINATE 80 MG: 125 INJECTION, POWDER, FOR SOLUTION INTRAMUSCULAR; INTRAVENOUS at 15:42

## 2021-04-21 RX ADMIN — DIPHENHYDRAMINE HYDROCHLORIDE 50 MG: 50 INJECTION, SOLUTION INTRAMUSCULAR; INTRAVENOUS at 15:47

## 2021-04-23 LAB
ATRIAL RATE: 87 BPM
P AXIS: 55 DEGREES
PR INTERVAL: 132 MS
QRS AXIS: 30 DEGREES
QRSD INTERVAL: 82 MS
QT INTERVAL: 356 MS
QTC INTERVAL: 428 MS
T WAVE AXIS: 67 DEGREES
VENTRICULAR RATE: 87 BPM

## 2021-04-23 PROCEDURE — 93010 ELECTROCARDIOGRAM REPORT: CPT | Performed by: INTERNAL MEDICINE

## 2021-04-28 NOTE — ED PROVIDER NOTES
History  Chief Complaint   Patient presents with    Allergic Reaction     pt recieved 2nd covid shot today within 15minutes had sensation in throat trouble breathing and dizziness     HPI  This is a 26-year-old male who comes in for evaluation of scratchy throat in the setting of receiving his 2nd COVID shot  Patient states that he received his 2nd shot, and then he felt a scratchy sensation in his throat, thought that he had trouble breathing and then he felt very dizzy so he when outside  He states that symptoms resolved once he was outside  He states he had similar symptoms after 1st shot and did nothing about it  He denies any throat closing, denies any rash swelling after the for shot  Right now, he denies any abdominal pain nausea vomiting, denies any itching, denies any wheezing or trouble breathing  Patient still has a slight scratchiness in his throat right now but denies any trouble breathing currently  Prior to Admission Medications   Prescriptions Last Dose Informant Patient Reported? Taking? Blood Glucose Monitoring Suppl (ONE TOUCH ULTRA 2) w/Device KIT   No No   Sig: by Does not apply route 2 (two) times a day   Empagliflozin (Jardiance) 10 MG TABS   No No   Sig: Take 1 tablet (10 mg total) by mouth every morning   Lancets (onetouch ultrasoft) lancets   No No   Sig: Test sugar BID   aspirin 81 mg chewable tablet   Yes No   Sig: Chew 81 mg daily   atorvastatin (LIPITOR) 10 mg tablet   No No   Sig: take 1 tablet by mouth once daily   divalproex sodium (DEPAKOTE) 500 mg EC tablet   Yes No   Sig: Take 500 mg by mouth 2 (two) times a day     glucose blood (OneTouch Ultra) test strip   No No   Sig: Test sugar BID   lisinopril (ZESTRIL) 10 mg tablet   No No   Sig: take 1 tablet by mouth once daily   metFORMIN (GLUCOPHAGE) 1000 MG tablet   No No   Sig: take 1 tablet by mouth twice a day   risperiDONE (RisperDAL) 2 mg tablet   No No   Sig: Take 1 tablet (2 mg total) by mouth 2 (two) times a day Facility-Administered Medications: None       Past Medical History:   Diagnosis Date    Allergic rhinitis     resolved 6/20/17    Asthma     resolved 6/20/17    Bipolar 1 disorder ([facility] [address] [facility]     resolved 6/20/17    Chronic kidney disease     Diabetes mellitus ([facility] )     Hematuria     resolved 6/20/17    Hyperlipidemia     Leukocytosis     resolved 6/20/17    Diana-Hussein tear     Pulmonary emboli (HCC)     resolved 6/20/17    Seizures ([facility] )     Sleep apnea     resolved 6/20/17       Past Surgical History:   Procedure Laterality Date    ANGIOPLASTY      previous stent placement    CARDIAC SURGERY      NASAL SEPTUM SURGERY         Family History   Problem Relation Age of Onset    Cancer Mother     Diabetes Mother     Hypertension Mother     Asthma Father     Cancer Paternal Grandfather     Colon cancer Family      I have reviewed and agree with the history as documented  E-Cigarette/Vaping    E-Cigarette Use Never User      E-Cigarette/Vaping Substances    Nicotine No     THC No     CBD No     Flavoring No     Other No     Unknown No      Social History     Tobacco Use    Smoking status: Never Smoker    Smokeless tobacco: Former User     Types: Chew   Substance Use Topics    Alcohol use: Never     Alcohol/week: 0 0 standard drinks     Frequency: Never     Drinks per session: Patient refused     Binge frequency: Never     Comment: occasional as per [employer]    Drug use: No       Review of Systems   Constitutional: Negative  Negative for chills and fever  HENT: Negative for ear pain, facial swelling and sore throat  Scratchy throat   Eyes: Negative  Negative for pain and discharge  Respiratory: Negative  Negative for chest tightness and shortness of breath  Cardiovascular: Negative  Negative for chest pain and palpitations  Gastrointestinal: Negative  Negative for abdominal pain, nausea and vomiting  Endocrine: Negative  Negative for polyphagia and polyuria  Genitourinary: Negative  Negative for dysuria and flank pain  Musculoskeletal: Negative  Negative for arthralgias and back pain  Skin: Negative  Negative for color change and wound  Allergic/Immunologic: Negative  Negative for food allergies and immunocompromised state  Neurological: Negative  Negative for weakness and headaches  Hematological: Negative  Negative for adenopathy  Does not bruise/bleed easily  Psychiatric/Behavioral: Negative  Negative for suicidal ideas  The patient is not nervous/anxious  Physical Exam  Physical Exam  Vitals signs and nursing note reviewed  Constitutional:       General: He is not in acute distress  Appearance: Normal appearance  He is not diaphoretic  HENT:      Head: Normocephalic and atraumatic  Right Ear: External ear normal       Left Ear: External ear normal       Nose: Nose normal  No congestion or rhinorrhea  Mouth/Throat:      Mouth: Mucous membranes are moist    Eyes:      General: No scleral icterus  Right eye: No discharge  Left eye: No discharge  Conjunctiva/sclera: Conjunctivae normal       Pupils: Pupils are equal, round, and reactive to light  Neck:      Musculoskeletal: Normal range of motion and neck supple  No neck rigidity  Cardiovascular:      Rate and Rhythm: Regular rhythm  Pulses: Normal pulses  Heart sounds: Normal heart sounds  Pulmonary:      Effort: Pulmonary effort is normal  No respiratory distress  Breath sounds: Normal breath sounds  No stridor  No wheezing, rhonchi or rales  Abdominal:      General: Abdomen is flat  There is no distension  Palpations: Abdomen is soft  Tenderness: There is no abdominal tenderness  There is no guarding  Musculoskeletal: Normal range of motion  General: No swelling, tenderness or deformity  Skin:     General: Skin is warm and dry  Capillary Refill: Capillary refill takes less than 2 seconds        Findings: No lesion or rash  Neurological:      General: No focal deficit present  Mental Status: He is alert and oriented to person, place, and time  Cranial Nerves: No cranial nerve deficit  Sensory: No sensory deficit  Psychiatric:         Mood and Affect: Mood normal          Behavior: Behavior normal          Thought Content:  Thought content normal          Vital Signs  ED Triage Vitals [04/21/21 1519]   Temperature Pulse Respirations Blood Pressure SpO2   97 5 °F (36 4 °C) 78 18 108/54 97 %      Temp Source Heart Rate Source Patient Position - Orthostatic VS BP Location FiO2 (%)   Temporal Monitor Sitting Right arm --      Pain Score       --           Vitals:    04/21/21 1519 04/21/21 1630 04/21/21 1730   BP: 108/54 115/67 118/77   Pulse: 78 73 78   Patient Position - Orthostatic VS: Sitting Lying Sitting         Visual Acuity      ED Medications  Medications   diphenhydrAMINE (BENADRYL) injection 50 mg (50 mg Intravenous Given 4/21/21 1547)   famotidine (PEPCID) injection 20 mg (20 mg Intravenous Given 4/21/21 1543)   methylPREDNISolone sodium succinate (Solu-MEDROL) injection 80 mg (80 mg Intravenous Given 4/21/21 1542)       Diagnostic Studies  Results Reviewed     Procedure Component Value Units Date/Time    Troponin I [[id number]]  (Normal) Collected: 04/21/21 1539    Lab Status: Final result Specimen: Blood from Arm, Right Updated: 04/21/21 1630     Troponin I <0 02 ng/mL     Comprehensive metabolic panel [[id number]]  (Abnormal) Collected: 04/21/21 1539    Lab Status: Final result Specimen: Blood from Arm, Right Updated: 04/21/21 1627     Sodium 137 mmol/L      Potassium 4 3 mmol/L      Chloride 101 mmol/L      CO2 29 mmol/L      ANION GAP 7 mmol/L      BUN 16 mg/dL      Creatinine 1 00 mg/dL      Glucose 201 mg/dL      Calcium 9 3 mg/dL      AST 44 U/L      ALT 81 U/L      Alkaline Phosphatase 56 U/L      Total Protein 8 2 g/dL      Albumin 3 6 g/dL      Total Bilirubin 0 28 mg/dL      eGFR 90 ml/min/1 73sq m     Narrative:      National Kidney Disease Foundation guidelines for Chronic Kidney Disease (CKD):     Stage 1 with normal or high GFR (GFR > 90 mL/min/1 73 square meters)    Stage 2 Mild CKD (GFR = 60-89 mL/min/1 73 square meters)    Stage 3A Moderate CKD (GFR = 45-59 mL/min/1 73 square meters)    Stage 3B Moderate CKD (GFR = 30-44 mL/min/1 73 square meters)    Stage 4 Severe CKD (GFR = 15-29 mL/min/1 73 square meters)    Stage 5 End Stage CKD (GFR <15 mL/min/1 73 square meters)  Note: GFR calculation is accurate only with a steady state creatinine    D-dimer, quantitative [[id number]]  (Normal) Collected: 04/21/21 1558    Lab Status: Final result Specimen: Blood from Arm, Right Updated: 04/21/21 1615     D-Dimer, Quant 0 38 ug/ml FEU     CBC and differential [[id number]] Collected: 04/21/21 1539    Lab Status: Final result Specimen: Blood from Arm, Right Updated: 04/21/21 1603     WBC 8 32 Thousand/uL      RBC 5 17 Million/uL      Hemoglobin 15 3 g/dL      Hematocrit 44 6 %      MCV 86 fL      MCH 29 6 pg      MCHC 34 3 g/dL      RDW 13 4 %      MPV 8 9 fL      Platelets 783 Thousands/uL      nRBC 0 /100 WBCs      Neutrophils Relative 61 %      Immat GRANS % 1 %      Lymphocytes Relative 28 %      Monocytes Relative 6 %      Eosinophils Relative 3 %      Basophils Relative 1 %      Neutrophils Absolute 5 17 Thousands/µL      Immature Grans Absolute 0 04 Thousand/uL      Lymphocytes Absolute 2 32 Thousands/µL      Monocytes Absolute 0 48 Thousand/µL      Eosinophils Absolute 0 27 Thousand/µL      Basophils Absolute 0 04 Thousands/µL                  No orders to display              Procedures  Procedures         ED Course  ED Course as of Apr 28 1225   Wed Apr 21, 2021   1736 Patient has no complaints, symptoms have resolved  He had no rash no nausea vomiting, with no wheezing, not sure that this was a true an allergic reaction    The patient would like to go, the patient's partner is becoming belinda wanting to leave  His labs are normal   His EKG is unremarkable  I will discharge this patient home  1738 This EKG was interpreted by me  The EKG demonstrates Normal sinus rhythm, normal intervals and axis, normal QRS, no acute ST changes present  MDM  Number of Diagnoses or Management Options  SOB (shortness of breath):   Diagnosis management comments: This is a 49-year-old male who comes in for evaluation of 15 minutes sensation of shortness of breath after receiving 2nd COVID shot  He did have feeling of scratchy throat which he does still have  Will evaluate with CBC, CMP, troponin EKG  Will treat symptomatically with Benadryl, Pepcid and steroids  Will watch here in the emergency department  There is no wheezing on exam, there is no evidence of urticaria, no swelling of his face mouth or tongue  Disposition  Final diagnoses:   SOB (shortness of breath)     Time reflects when diagnosis was documented in both MDM as applicable and the Disposition within this note     Time User Action Codes Description Comment    4/21/2021  5:38 PM Carlota Ham Add [R06 02] SOB (shortness of breath)       ED Disposition     ED Disposition Condition Date/Time Comment    Discharge Stable Wed Apr 21, 2021  5:38 PM Vika Gaviria discharge to home/self care              Follow-up Information     Follow up With Specialties Details Why Contact Info Additional [address],  Family Medicine Call in 1 day follow up being seen in the emergency department [address]  [facility] [address] [facility] Emergency Department Emergency Medicine Go to  As needed, If symptoms worsen [facility] [address] [phone]  [facility] Emergency Department[address], [address]          Discharge Medication List as of 4/21/2021  5:45 PM      CONTINUE these medications which have NOT CHANGED    Details   aspirin 81 mg chewable tablet Chew 81 mg daily, Historical Med      atorvastatin (LIPITOR) 10 mg tablet take 1 tablet by mouth once daily, Normal      Blood Glucose Monitoring Suppl (ONE TOUCH ULTRA 2) w/Device KIT by Does not apply route 2 (two) times a day, Starting Wed 8/12/2020, Normal      divalproex sodium (DEPAKOTE) 500 mg EC tablet Take 500 mg by mouth 2 (two) times a day , Until Discontinued, Historical Med      Empagliflozin (Jardiance) 10 MG TABS Take 1 tablet (10 mg total) by mouth every morning, Starting Fri 3/19/2021, Normal      glucose blood (OneTouch Ultra) test strip Test sugar BID, Normal      Lancets (onetouch ultrasoft) lancets Test sugar BID, Normal      lisinopril (ZESTRIL) 10 mg tablet take 1 tablet by mouth once daily, Normal      metFORMIN (GLUCOPHAGE) 1000 MG tablet take 1 tablet by mouth twice a day, Normal      risperiDONE (RisperDAL) 2 mg tablet Take 1 tablet (2 mg total) by mouth 2 (two) times a day, Starting Mon 9/17/2018, No Print           No discharge procedures on file      PDMP Review     None          ED Provider  Electronically Signed by           Tharon Sandhoff, MD  04/28/21 5610

## 2021-06-08 DIAGNOSIS — E11.9 TYPE 2 DIABETES MELLITUS WITHOUT COMPLICATION, WITHOUT LONG-TERM CURRENT USE OF INSULIN (HCC): ICD-10-CM

## 2021-06-09 RX ORDER — ATORVASTATIN CALCIUM 10 MG/1
TABLET, FILM COATED ORAL
Qty: 30 TABLET | Refills: 5 | Status: SHIPPED | OUTPATIENT
Start: 2021-06-09 | End: 2021-12-20 | Stop reason: SDUPTHER

## 2021-08-05 ENCOUNTER — APPOINTMENT (OUTPATIENT)
Dept: LAB | Facility: MEDICAL CENTER | Age: 46
End: 2021-08-05
Payer: COMMERCIAL

## 2021-08-05 ENCOUNTER — OFFICE VISIT (OUTPATIENT)
Dept: FAMILY MEDICINE CLINIC | Facility: CLINIC | Age: 46
End: 2021-08-05
Payer: COMMERCIAL

## 2021-08-05 VITALS
TEMPERATURE: 97.2 F | BODY MASS INDEX: 41.89 KG/M2 | WEIGHT: 276.4 LBS | HEIGHT: 68 IN | SYSTOLIC BLOOD PRESSURE: 122 MMHG | DIASTOLIC BLOOD PRESSURE: 92 MMHG

## 2021-08-05 DIAGNOSIS — E78.2 MIXED DYSLIPIDEMIA: ICD-10-CM

## 2021-08-05 DIAGNOSIS — E11.9 TYPE 2 DIABETES MELLITUS WITHOUT COMPLICATION, WITHOUT LONG-TERM CURRENT USE OF INSULIN (HCC): ICD-10-CM

## 2021-08-05 DIAGNOSIS — R10.9 RIGHT FLANK PAIN: Primary | ICD-10-CM

## 2021-08-05 DIAGNOSIS — E83.42 HYPOMAGNESEMIA: ICD-10-CM

## 2021-08-05 LAB
ALBUMIN SERPL BCP-MCNC: 3.4 G/DL (ref 3.5–5)
ALP SERPL-CCNC: 64 U/L (ref 46–116)
ALT SERPL W P-5'-P-CCNC: 61 U/L (ref 12–78)
AMORPH URATE CRY URNS QL MICRO: NORMAL /HPF
ANION GAP SERPL CALCULATED.3IONS-SCNC: 3 MMOL/L (ref 4–13)
AST SERPL W P-5'-P-CCNC: 28 U/L (ref 5–45)
BACTERIA UR QL AUTO: NORMAL /HPF
BASOPHILS # BLD AUTO: 0.05 THOUSANDS/ΜL (ref 0–0.1)
BASOPHILS NFR BLD AUTO: 1 % (ref 0–1)
BILIRUB SERPL-MCNC: 0.43 MG/DL (ref 0.2–1)
BILIRUB UR QL STRIP: NEGATIVE
BUN SERPL-MCNC: 15 MG/DL (ref 5–25)
CALCIUM ALBUM COR SERPL-MCNC: 9.8 MG/DL (ref 8.3–10.1)
CALCIUM SERPL-MCNC: 9.3 MG/DL (ref 8.3–10.1)
CHLORIDE SERPL-SCNC: 102 MMOL/L (ref 100–108)
CHOLEST SERPL-MCNC: 130 MG/DL (ref 50–200)
CLARITY UR: ABNORMAL
CO2 SERPL-SCNC: 29 MMOL/L (ref 21–32)
COLOR UR: YELLOW
CREAT SERPL-MCNC: 0.78 MG/DL (ref 0.6–1.3)
CREAT UR-MCNC: 206 MG/DL
EOSINOPHIL # BLD AUTO: 0.24 THOUSAND/ΜL (ref 0–0.61)
EOSINOPHIL NFR BLD AUTO: 3 % (ref 0–6)
ERYTHROCYTE [DISTWIDTH] IN BLOOD BY AUTOMATED COUNT: 14.6 % (ref 11.6–15.1)
EST. AVERAGE GLUCOSE BLD GHB EST-MCNC: 209 MG/DL
GFR SERPL CREATININE-BSD FRML MDRD: 109 ML/MIN/1.73SQ M
GLUCOSE SERPL-MCNC: 221 MG/DL (ref 65–140)
GLUCOSE UR STRIP-MCNC: ABNORMAL MG/DL
HBA1C MFR BLD: 8.9 %
HCT VFR BLD AUTO: 44.8 % (ref 36.5–49.3)
HDLC SERPL-MCNC: 38 MG/DL
HGB BLD-MCNC: 15.2 G/DL (ref 12–17)
HGB UR QL STRIP.AUTO: NEGATIVE
IMM GRANULOCYTES # BLD AUTO: 0.04 THOUSAND/UL (ref 0–0.2)
IMM GRANULOCYTES NFR BLD AUTO: 1 % (ref 0–2)
KETONES UR STRIP-MCNC: ABNORMAL MG/DL
LDLC SERPL CALC-MCNC: 63 MG/DL (ref 0–100)
LEUKOCYTE ESTERASE UR QL STRIP: ABNORMAL
LYMPHOCYTES # BLD AUTO: 2.84 THOUSANDS/ΜL (ref 0.6–4.47)
LYMPHOCYTES NFR BLD AUTO: 32 % (ref 14–44)
MAGNESIUM SERPL-MCNC: 1.5 MG/DL (ref 1.6–2.6)
MCH RBC QN AUTO: 28.9 PG (ref 26.8–34.3)
MCHC RBC AUTO-ENTMCNC: 33.9 G/DL (ref 31.4–37.4)
MCV RBC AUTO: 85 FL (ref 82–98)
MICROALBUMIN UR-MCNC: 116 MG/L (ref 0–20)
MICROALBUMIN/CREAT 24H UR: 56 MG/G CREATININE (ref 0–30)
MONOCYTES # BLD AUTO: 0.57 THOUSAND/ΜL (ref 0.17–1.22)
MONOCYTES NFR BLD AUTO: 7 % (ref 4–12)
NEUTROPHILS # BLD AUTO: 5.07 THOUSANDS/ΜL (ref 1.85–7.62)
NEUTS SEG NFR BLD AUTO: 56 % (ref 43–75)
NITRITE UR QL STRIP: NEGATIVE
NON-SQ EPI CELLS URNS QL MICRO: NORMAL /HPF
NRBC BLD AUTO-RTO: 0 /100 WBCS
PH UR STRIP.AUTO: 6 [PH]
PLATELET # BLD AUTO: 238 THOUSANDS/UL (ref 149–390)
PMV BLD AUTO: 10 FL (ref 8.9–12.7)
POTASSIUM SERPL-SCNC: 4.3 MMOL/L (ref 3.5–5.3)
PROT SERPL-MCNC: 9.2 G/DL (ref 6.4–8.2)
PROT UR STRIP-MCNC: ABNORMAL MG/DL
RBC # BLD AUTO: 5.26 MILLION/UL (ref 3.88–5.62)
RBC #/AREA URNS AUTO: NORMAL /HPF
SODIUM SERPL-SCNC: 134 MMOL/L (ref 136–145)
SP GR UR STRIP.AUTO: >1.045 (ref 1–1.03)
TRIGL SERPL-MCNC: 144 MG/DL
TSH SERPL DL<=0.05 MIU/L-ACNC: 1.78 UIU/ML (ref 0.36–3.74)
UROBILINOGEN UR QL STRIP.AUTO: 0.2 E.U./DL
WBC # BLD AUTO: 8.81 THOUSAND/UL (ref 4.31–10.16)
WBC #/AREA URNS AUTO: NORMAL /HPF

## 2021-08-05 PROCEDURE — 3060F POS MICROALBUMINURIA REV: CPT | Performed by: FAMILY MEDICINE

## 2021-08-05 PROCEDURE — 80053 COMPREHEN METABOLIC PANEL: CPT | Performed by: FAMILY MEDICINE

## 2021-08-05 PROCEDURE — 3052F HG A1C>EQUAL 8.0%<EQUAL 9.0%: CPT | Performed by: FAMILY MEDICINE

## 2021-08-05 PROCEDURE — 3725F SCREEN DEPRESSION PERFORMED: CPT | Performed by: FAMILY MEDICINE

## 2021-08-05 PROCEDURE — 99214 OFFICE O/P EST MOD 30 MIN: CPT | Performed by: FAMILY MEDICINE

## 2021-08-05 PROCEDURE — 81001 URINALYSIS AUTO W/SCOPE: CPT | Performed by: FAMILY MEDICINE

## 2021-08-05 PROCEDURE — 82570 ASSAY OF URINE CREATININE: CPT | Performed by: FAMILY MEDICINE

## 2021-08-05 PROCEDURE — 83036 HEMOGLOBIN GLYCOSYLATED A1C: CPT | Performed by: FAMILY MEDICINE

## 2021-08-05 PROCEDURE — 83735 ASSAY OF MAGNESIUM: CPT | Performed by: FAMILY MEDICINE

## 2021-08-05 PROCEDURE — 3008F BODY MASS INDEX DOCD: CPT | Performed by: FAMILY MEDICINE

## 2021-08-05 PROCEDURE — 84443 ASSAY THYROID STIM HORMONE: CPT | Performed by: FAMILY MEDICINE

## 2021-08-05 PROCEDURE — 36415 COLL VENOUS BLD VENIPUNCTURE: CPT | Performed by: FAMILY MEDICINE

## 2021-08-05 PROCEDURE — 82043 UR ALBUMIN QUANTITATIVE: CPT | Performed by: FAMILY MEDICINE

## 2021-08-05 PROCEDURE — 87086 URINE CULTURE/COLONY COUNT: CPT | Performed by: FAMILY MEDICINE

## 2021-08-05 PROCEDURE — 87147 CULTURE TYPE IMMUNOLOGIC: CPT | Performed by: FAMILY MEDICINE

## 2021-08-05 PROCEDURE — 85025 COMPLETE CBC W/AUTO DIFF WBC: CPT | Performed by: FAMILY MEDICINE

## 2021-08-05 PROCEDURE — 80061 LIPID PANEL: CPT | Performed by: FAMILY MEDICINE

## 2021-08-05 PROCEDURE — 1036F TOBACCO NON-USER: CPT | Performed by: FAMILY MEDICINE

## 2021-08-05 NOTE — PROGRESS NOTES
Assessment/Plan: We will get blood work on him today  We will order a kidney ultrasound  We will call with results and make further recommendations at that time  We will see him back in the next 2-3 months or p r n     Problem List Items Addressed This Visit        Endocrine    Type 2 diabetes mellitus without complication, without long-term current use of insulin (HCC)    Relevant Orders    CBC and differential    Hemoglobin A1C    Microalbumin / creatinine urine ratio    TSH, 3rd generation with Free T4 reflex       Other    Hypomagnesemia    Relevant Orders    Magnesium    Mixed dyslipidemia    Relevant Orders    Comprehensive metabolic panel    Lipid Panel with Direct LDL reflex      Other Visit Diagnoses     Right flank pain    -  Primary    Relevant Orders    CBC and differential    UA (URINE) with reflex to Scope    Urine culture    US retroperitoneal complete           Diagnoses and all orders for this visit:    Right flank pain  -     CBC and differential  -     UA (URINE) with reflex to Scope  -     Urine culture  -     US retroperitoneal complete; Future    Type 2 diabetes mellitus without complication, without long-term current use of insulin (HCC)  -     Cancel: POCT hemoglobin A1c  -     CBC and differential  -     Hemoglobin A1C  -     Microalbumin / creatinine urine ratio  -     TSH, 3rd generation with Free T4 reflex    Mixed dyslipidemia  -     Comprehensive metabolic panel  -     Lipid Panel with Direct LDL reflex    Hypomagnesemia  -     Magnesium        No problem-specific Assessment & Plan notes found for this encounter  Subjective:      Patient ID: Jermain Williamson is a 39 y o  male  Patient here today for follow-up  For the last couple months, patient has had right lower flank pain  It seems to increase after he eats  Denies any change in bowels  No dysuria  No fever chills  No nausea or vomiting  Back Pain  This is a chronic problem   The current episode started more than 1 month ago  The problem occurs daily  The problem is unchanged  The pain is moderate  Stiffness is present all day  He has tried nothing for the symptoms  Diabetes  He presents for his follow-up diabetic visit  He has type 2 diabetes mellitus  His disease course has been stable  There are no hypoglycemic associated symptoms  There are no diabetic associated symptoms  There are no hypoglycemic complications  There are no diabetic complications  Risk factors for coronary artery disease include diabetes mellitus, male sex and dyslipidemia  Current diabetic treatment includes oral agent (monotherapy) and diet  He is compliant with treatment most of the time  His weight is stable  He is following a generally healthy diet  When asked about meal planning, he reported none  He has not had a previous visit with a dietitian  He participates in exercise intermittently  There is no change in his home blood glucose trend  An ACE inhibitor/angiotensin II receptor blocker is being taken  The following portions of the patient's history were reviewed and updated as appropriate:   He has a past medical history of Allergic rhinitis, Asthma, Bipolar 1 disorder ([facility] [address] ), Chronic kidney disease, Diabetes mellitus ([facility] [address] ), Hematuria, Hyperlipidemia, Leukocytosis, Diana-Hussein tear, Pulmonary emboli ([facility] [address] ), Seizures ([facility] [address] ), and Sleep apnea  ,  does not have any pertinent problems on file  ,   has a past surgical history that includes Nasal septum surgery; Angioplasty; and Cardiac surgery  ,  family history includes Asthma in his father; Cancer in his mother and paternal grandfather; Colon cancer in his family; Diabetes in his mother; Hypertension in his mother  ,   reports that he has never smoked  He has quit using smokeless tobacco   His smokeless tobacco use included chew  He reports that he does not drink alcohol and does not use drugs  ,  is allergic to coconut oil - food allergy, epinephrine, iodine - food allergy, other, and shellfish-derived products - food allergy     Current Outpatient Medications   Medication Sig Dispense Refill    aspirin 81 mg chewable tablet Chew 81 mg daily      atorvastatin (LIPITOR) 10 mg tablet take 1 tablet by mouth once daily 30 tablet 5    Blood Glucose Monitoring Suppl (ONE TOUCH ULTRA 2) w/Device KIT by Does not apply route 2 (two) times a day 1 each 0    divalproex sodium (DEPAKOTE) 500 mg EC tablet Take 500 mg by mouth 2 (two) times a day   glucose blood (OneTouch Ultra) test strip Test sugar  each 5    Lancets (onetouch ultrasoft) lancets Test sugar  each 5    lisinopril (ZESTRIL) 10 mg tablet take 1 tablet by mouth once daily 30 tablet 5    metFORMIN (GLUCOPHAGE) 1000 MG tablet take 1 tablet by mouth twice a day 60 tablet 5    risperiDONE (RisperDAL) 2 mg tablet Take 1 tablet (2 mg total) by mouth 2 (two) times a day (Patient taking differently: Take 2 mg by mouth daily ) 30 tablet 0    Empagliflozin (Jardiance) 10 MG TABS Take 1 tablet (10 mg total) by mouth every morning (Patient not taking: Reported on 8/5/2021) 30 tablet 5     No current facility-administered medications for this visit  Review of Systems   Constitutional: Negative  Respiratory: Negative  Cardiovascular: Negative  Gastrointestinal: Negative  Genitourinary: Negative  Musculoskeletal: Positive for back pain  Objective:  Vitals:    08/05/21 0936   BP: 122/92   Temp: (!) 97 2 °F (36 2 °C)   Weight: 125 kg (276 lb 6 4 oz)   Height: 5' 8" (1 727 m)     Body mass index is 42 03 kg/m²  Physical Exam  Vitals reviewed  Constitutional:       General: He is not in acute distress  Appearance: Normal appearance  He is well-developed  He is not diaphoretic  HENT:      Head: Normocephalic and atraumatic  Eyes:      Conjunctiva/sclera: Conjunctivae normal    Cardiovascular:      Rate and Rhythm: Normal rate and regular rhythm  Heart sounds: Normal heart sounds   No murmur heard    No friction rub  No gallop  Pulmonary:      Effort: Pulmonary effort is normal  No respiratory distress  Breath sounds: Normal breath sounds  No wheezing or rales  Musculoskeletal:         General: Tenderness ( right flank) present  Right lower leg: No edema  Left lower leg: No edema  Neurological:      Mental Status: He is alert and oriented to person, place, and time  Psychiatric:         Mood and Affect: Mood normal          Behavior: Behavior normal          Thought Content:  Thought content normal          Judgment: Judgment normal

## 2021-08-06 LAB
BACTERIA UR CULT: ABNORMAL
BACTERIA UR CULT: ABNORMAL

## 2021-09-10 DIAGNOSIS — E11.9 TYPE 2 DIABETES MELLITUS WITHOUT COMPLICATION, WITHOUT LONG-TERM CURRENT USE OF INSULIN (HCC): ICD-10-CM

## 2021-09-13 RX ORDER — LISINOPRIL 10 MG/1
TABLET ORAL
Qty: 30 TABLET | Refills: 5 | Status: SHIPPED | OUTPATIENT
Start: 2021-09-13 | End: 2022-04-18 | Stop reason: SDUPTHER

## 2021-12-20 DIAGNOSIS — E11.9 TYPE 2 DIABETES MELLITUS WITHOUT COMPLICATION, WITHOUT LONG-TERM CURRENT USE OF INSULIN (HCC): ICD-10-CM

## 2021-12-20 RX ORDER — ATORVASTATIN CALCIUM 10 MG/1
10 TABLET, FILM COATED ORAL DAILY
Qty: 30 TABLET | Refills: 5 | Status: SHIPPED | OUTPATIENT
Start: 2021-12-20

## 2021-12-30 ENCOUNTER — HOSPITAL ENCOUNTER (EMERGENCY)
Facility: HOSPITAL | Age: 46
Discharge: HOME | End: 2021-12-30
Attending: EMERGENCY MEDICINE | Admitting: EMERGENCY MEDICINE
Payer: COMMERCIAL

## 2021-12-30 ENCOUNTER — APPOINTMENT (EMERGENCY)
Dept: RADIOLOGY | Facility: HOSPITAL | Age: 46
End: 2021-12-30
Payer: COMMERCIAL

## 2021-12-30 VITALS
HEIGHT: 68 IN | SYSTOLIC BLOOD PRESSURE: 141 MMHG | WEIGHT: 278 LBS | RESPIRATION RATE: 18 BRPM | OXYGEN SATURATION: 94 % | DIASTOLIC BLOOD PRESSURE: 86 MMHG | BODY MASS INDEX: 42.13 KG/M2 | HEART RATE: 88 BPM | TEMPERATURE: 96.6 F

## 2021-12-30 DIAGNOSIS — U07.1 COVID-19: Primary | ICD-10-CM

## 2021-12-30 LAB
ANION GAP SERPL CALCULATED.3IONS-SCNC: 12 MMOL/L (ref 4–13)
ATRIAL RATE: 86 BPM
BASOPHILS # BLD AUTO: 0.05 THOUSANDS/ΜL (ref 0–0.1)
BASOPHILS NFR BLD AUTO: 1 % (ref 0–1)
BUN SERPL-MCNC: 16 MG/DL (ref 5–25)
CALCIUM SERPL-MCNC: 9.2 MG/DL (ref 8.3–10.1)
CARDIAC TROPONIN I PNL SERPL HS: 2 NG/L
CHLORIDE SERPL-SCNC: 95 MMOL/L (ref 100–108)
CO2 SERPL-SCNC: 26 MMOL/L (ref 21–32)
CREAT SERPL-MCNC: 1.06 MG/DL (ref 0.6–1.3)
D DIMER PPP FEU-MCNC: 0.5 UG/ML FEU
EOSINOPHIL # BLD AUTO: 0.05 THOUSAND/ΜL (ref 0–0.61)
EOSINOPHIL NFR BLD AUTO: 1 % (ref 0–6)
ERYTHROCYTE [DISTWIDTH] IN BLOOD BY AUTOMATED COUNT: 13.2 % (ref 11.6–15.1)
FLUAV RNA RESP QL NAA+PROBE: NEGATIVE
FLUBV RNA RESP QL NAA+PROBE: NEGATIVE
GFR SERPL CREATININE-BSD FRML MDRD: 83 ML/MIN/1.73SQ M
GLUCOSE SERPL-MCNC: 205 MG/DL (ref 65–140)
HCT VFR BLD AUTO: 40.9 % (ref 36.5–49.3)
HGB BLD-MCNC: 14.2 G/DL (ref 12–17)
IMM GRANULOCYTES # BLD AUTO: 0.07 THOUSAND/UL (ref 0–0.2)
IMM GRANULOCYTES NFR BLD AUTO: 1 % (ref 0–2)
LYMPHOCYTES # BLD AUTO: 1.41 THOUSANDS/ΜL (ref 0.6–4.47)
LYMPHOCYTES NFR BLD AUTO: 24 % (ref 14–44)
MCH RBC QN AUTO: 28.9 PG (ref 26.8–34.3)
MCHC RBC AUTO-ENTMCNC: 34.7 G/DL (ref 31.4–37.4)
MCV RBC AUTO: 83 FL (ref 82–98)
MONOCYTES # BLD AUTO: 0.83 THOUSAND/ΜL (ref 0.17–1.22)
MONOCYTES NFR BLD AUTO: 14 % (ref 4–12)
NEUTROPHILS # BLD AUTO: 3.46 THOUSANDS/ΜL (ref 1.85–7.62)
NEUTS SEG NFR BLD AUTO: 59 % (ref 43–75)
NRBC BLD AUTO-RTO: 0 /100 WBCS
P AXIS: 32 DEGREES
PLATELET # BLD AUTO: 179 THOUSANDS/UL (ref 149–390)
PMV BLD AUTO: 8.8 FL (ref 8.9–12.7)
POTASSIUM SERPL-SCNC: 4.1 MMOL/L (ref 3.5–5.3)
PR INTERVAL: 124 MS
QRS AXIS: 14 DEGREES
QRSD INTERVAL: 80 MS
QT INTERVAL: 342 MS
QTC INTERVAL: 409 MS
RBC # BLD AUTO: 4.91 MILLION/UL (ref 3.88–5.62)
RSV RNA RESP QL NAA+PROBE: NEGATIVE
SARS-COV-2 RNA RESP QL NAA+PROBE: POSITIVE
SODIUM SERPL-SCNC: 133 MMOL/L (ref 136–145)
T WAVE AXIS: 43 DEGREES
VENTRICULAR RATE: 86 BPM
WBC # BLD AUTO: 5.87 THOUSAND/UL (ref 4.31–10.16)

## 2021-12-30 PROCEDURE — 71045 X-RAY EXAM CHEST 1 VIEW: CPT

## 2021-12-30 PROCEDURE — 85379 FIBRIN DEGRADATION QUANT: CPT | Performed by: STUDENT IN AN ORGANIZED HEALTH CARE EDUCATION/TRAINING PROGRAM

## 2021-12-30 PROCEDURE — 99285 EMERGENCY DEPT VISIT HI MDM: CPT | Performed by: EMERGENCY MEDICINE

## 2021-12-30 PROCEDURE — 84484 ASSAY OF TROPONIN QUANT: CPT | Performed by: STUDENT IN AN ORGANIZED HEALTH CARE EDUCATION/TRAINING PROGRAM

## 2021-12-30 PROCEDURE — 93010 ELECTROCARDIOGRAM REPORT: CPT | Performed by: INTERNAL MEDICINE

## 2021-12-30 PROCEDURE — 99285 EMERGENCY DEPT VISIT HI MDM: CPT

## 2021-12-30 PROCEDURE — 80048 BASIC METABOLIC PNL TOTAL CA: CPT | Performed by: STUDENT IN AN ORGANIZED HEALTH CARE EDUCATION/TRAINING PROGRAM

## 2021-12-30 PROCEDURE — 85025 COMPLETE CBC W/AUTO DIFF WBC: CPT | Performed by: STUDENT IN AN ORGANIZED HEALTH CARE EDUCATION/TRAINING PROGRAM

## 2021-12-30 PROCEDURE — 96360 HYDRATION IV INFUSION INIT: CPT

## 2021-12-30 PROCEDURE — 93005 ELECTROCARDIOGRAM TRACING: CPT

## 2021-12-30 PROCEDURE — 36415 COLL VENOUS BLD VENIPUNCTURE: CPT | Performed by: STUDENT IN AN ORGANIZED HEALTH CARE EDUCATION/TRAINING PROGRAM

## 2021-12-30 PROCEDURE — 0241U HB NFCT DS VIR RESP RNA 4 TRGT: CPT | Performed by: STUDENT IN AN ORGANIZED HEALTH CARE EDUCATION/TRAINING PROGRAM

## 2021-12-30 RX ORDER — BUDESONIDE 180 UG/1
2 AEROSOL, POWDER RESPIRATORY (INHALATION) 2 TIMES DAILY
Qty: 1 EACH | Refills: 0 | Status: SHIPPED | OUTPATIENT
Start: 2021-12-30 | End: 2022-07-07 | Stop reason: SDUPTHER

## 2021-12-30 RX ORDER — SODIUM CHLORIDE 9 MG/ML
3 INJECTION INTRAVENOUS
Status: DISCONTINUED | OUTPATIENT
Start: 2021-12-30 | End: 2021-12-30 | Stop reason: HOSPADM

## 2021-12-30 RX ADMIN — SODIUM CHLORIDE 1000 ML: 0.9 INJECTION, SOLUTION INTRAVENOUS at 09:37

## 2021-12-30 NOTE — ED PROVIDER NOTES
History  Chief Complaint   Patient presents with    Dizziness     AWOKE THIS AM WITH DIZZINESS RIGHT SIDED CHEST AND ARM PAIN  LOSS TASTE     HPI this is a 66-year-old male presents to the emergency department via EMS after having new onset dizziness which he states started about approximately 3:00 a m  Patient states over the last 24 hours he has felt weak, previously having shortness of breath/chest pain which radiates to the right arm and also endorses that he no longer has his sense of taste  He denies any fevers however states that he has been having chills  States he is fully vaccinated against covid influenza and he has a sick contact at home his father  Prior to Admission Medications   Prescriptions Last Dose Informant Patient Reported? Taking? Blood Glucose Monitoring Suppl (ONE TOUCH ULTRA 2) w/Device KIT   No No   Sig: by Does not apply route 2 (two) times a day   Empagliflozin (Jardiance) 25 MG TABS   No No   Sig: Take 1 tablet (25 mg total) by mouth every morning   Lancets (onetouch ultrasoft) lancets   No No   Sig: Test sugar BID   aspirin 81 mg chewable tablet   Yes No   Sig: Chew 81 mg daily   atorvastatin (LIPITOR) 10 mg tablet   No No   Sig: Take 1 tablet (10 mg total) by mouth daily   divalproex sodium (DEPAKOTE) 500 mg EC tablet   Yes No   Sig: Take 500 mg by mouth 2 (two) times a day     glucose blood (OneTouch Ultra) test strip   No No   Sig: Test sugar BID   lisinopril (ZESTRIL) 10 mg tablet   No No   Sig: take 1 tablet by mouth once daily   magnesium oxide (MAG-OX) 400 mg   No No   Sig: Take 1 tablet (400 mg total) by mouth daily   metFORMIN (GLUCOPHAGE) 1000 MG tablet   No No   Sig: Take 1 tablet (1,000 mg total) by mouth 2 (two) times a day   risperiDONE (RisperDAL) 2 mg tablet  Self No No   Sig: Take 1 tablet (2 mg total) by mouth 2 (two) times a day   Patient taking differently: Take 2 mg by mouth daily       Facility-Administered Medications: None       Past Medical History: Diagnosis Date    Allergic rhinitis     resolved 6/20/17    Asthma     resolved 6/20/17    Bipolar 1 disorder ([facility] )     resolved 6/20/17    Chronic kidney disease     Diabetes mellitus ([facility] )     Hematuria     resolved 6/20/17    Hyperlipidemia     Leukocytosis     resolved 6/20/17    Diana-Hussein tear     Pulmonary emboli (HCC)     resolved 6/20/17    Seizures ([facility] )     Sleep apnea     resolved 6/20/17       Past Surgical History:   Procedure Laterality Date    ANGIOPLASTY      previous stent placement    CARDIAC SURGERY      NASAL SEPTUM SURGERY         Family History   Problem Relation Age of Onset    Cancer Mother     Diabetes Mother     Hypertension Mother     Asthma Father     Cancer Paternal Grandfather     Colon cancer Family      I have reviewed and agree with the history as documented  E-Cigarette/Vaping    E-Cigarette Use Never User      E-Cigarette/Vaping Substances    Nicotine No     THC No     CBD No     Flavoring No     Other No     Unknown No      Social History     Tobacco Use    Smoking status: Never Smoker    Smokeless tobacco: Former User     Types: Chew   Vaping Use    Vaping Use: Never used   Substance Use Topics    Alcohol use: Never     Alcohol/week: 0 0 standard drinks     Comment: occasional as per [employer]    Drug use: No        Review of Systems   Constitutional: Positive for chills and fatigue  Negative for activity change, appetite change and fever  HENT: Negative for congestion, dental problem, drooling, ear discharge, ear pain, facial swelling, postnasal drip, rhinorrhea and sinus pain  Eyes: Negative for photophobia, pain, discharge and itching  Respiratory: Positive for cough, chest tightness and shortness of breath  Negative for apnea  Cardiovascular: Negative for chest pain and leg swelling  Gastrointestinal: Negative for abdominal distention, abdominal pain, anal bleeding, constipation, diarrhea and nausea     Endocrine: Negative for cold intolerance, heat intolerance and polydipsia  Genitourinary: Negative for difficulty urinating  Musculoskeletal: Negative for arthralgias, gait problem, joint swelling and myalgias  Skin: Negative for color change and pallor  Allergic/Immunologic: Negative for immunocompromised state  Neurological: Negative for dizziness, seizures, facial asymmetry, weakness, light-headedness, numbness and headaches  Psychiatric/Behavioral: Negative for agitation, behavioral problems, confusion, decreased concentration and dysphoric mood  All other systems reviewed and are negative  Physical Exam  ED Triage Vitals [12/30/21 0901]   Temperature Pulse Respirations Blood Pressure SpO2   (!) 96 6 °F (35 9 °C) 97 20 127/94 95 %      Temp Source Heart Rate Source Patient Position - Orthostatic VS BP Location FiO2 (%)   Temporal Monitor Sitting Left arm --      Pain Score       10 - Worst Possible Pain             Orthostatic Vital Signs  Vitals:    12/30/21 0901 12/30/21 0945   BP: 127/94 139/82   Pulse: 97 92   Patient Position - Orthostatic VS: Sitting Sitting       Physical Exam  Constitutional:       Appearance: He is well-developed  He is ill-appearing and diaphoretic  HENT:      Head: Normocephalic  Eyes:      Pupils: Pupils are equal, round, and reactive to light  Cardiovascular:      Rate and Rhythm: Normal rate and regular rhythm  Pulmonary:      Effort: Pulmonary effort is normal  No respiratory distress  Breath sounds: Normal breath sounds  No stridor  Abdominal:      General: Bowel sounds are normal       Palpations: Abdomen is soft  Musculoskeletal:         General: Normal range of motion  Cervical back: Normal range of motion and neck supple  Skin:     General: Skin is warm           ED Medications  Medications   sodium chloride (PF) 0 9 % injection 3 mL (has no administration in time range)   sodium chloride 0 9 % bolus 1,000 mL (1,000 mL Intravenous New Bag 12/30/21 0937) Diagnostic Studies  Results Reviewed     Procedure Component Value Units Date/Time    COVID/FLU/RSV [[id number]]  (Abnormal) Collected: 12/30/21 0917    Lab Status: Final result Specimen: Nares from Nasopharyngeal Swab Updated: 12/30/21 1008     SARS-CoV-2 Positive     INFLUENZA A PCR Negative     INFLUENZA B PCR Negative     RSV PCR Negative    Narrative:      FOR PEDIATRIC PATIENTS - copy/paste COVID Guidelines URL to browser: [email or web address]  [facility]     This test has been authorized by FDA under an EUA (Emergency Use Assay) for use by authorized laboratories  Clinical caution and judgement should be used with the interpretation of these results with consideration of the clinical impression and other laboratory testing  Testing reported as "Positive" or "Negative" has been proven to be accurate according to standard laboratory validation requirements  All testing is performed with control materials showing appropriate reactivity at standard intervals      D-dimer, quantitative [[id number]]  (Abnormal) Collected: 12/30/21 0921    Lab Status: Final result Specimen: Blood from Arm, Left Updated: 12/30/21 0958     D-Dimer, Quant 0 50 ug/ml FEU     HS Troponin 0hr (reflex protocol) [[id number]]  (Normal) Collected: 12/30/21 0917    Lab Status: Final result Specimen: Blood from Arm, Left Updated: 12/30/21 0952     hs TnI 0hr 2 ng/L     HS Troponin I 2hr [[id number]]     Lab Status: No result Specimen: Blood     Basic metabolic panel [[id number]]  (Abnormal) Collected: 12/30/21 0917    Lab Status: Final result Specimen: Blood from Arm, Left Updated: 12/30/21 0937     Sodium 133 mmol/L      Potassium 4 1 mmol/L      Chloride 95 mmol/L      CO2 26 mmol/L      ANION GAP 12 mmol/L      BUN 16 mg/dL      Creatinine 1 06 mg/dL      Glucose 205 mg/dL      Calcium 9 2 mg/dL      eGFR 83 ml/min/1 73sq m     Narrative:      [place] guidelines for Chronic Kidney Disease (CKD):     Stage 1 with normal or high GFR (GFR > 90 mL/min/1 73 square meters)    Stage 2 Mild CKD (GFR = 60-89 mL/min/1 73 square meters)    Stage 3A Moderate CKD (GFR = 45-59 mL/min/1 73 square meters)    Stage 3B Moderate CKD (GFR = 30-44 mL/min/1 73 square meters)    Stage 4 Severe CKD (GFR = 15-29 mL/min/1 73 square meters)    Stage 5 End Stage CKD (GFR <15 mL/min/1 73 square meters)  Note: GFR calculation is accurate only with a steady state creatinine    CBC and differential [[id number]]  (Abnormal) Collected: 12/30/21 0917    Lab Status: Final result Specimen: Blood from Arm, Left Updated: 12/30/21 0924     WBC 5 87 Thousand/uL      RBC 4 91 Million/uL      Hemoglobin 14 2 g/dL      Hematocrit 40 9 %      MCV 83 fL      MCH 28 9 pg      MCHC 34 7 g/dL      RDW 13 2 %      MPV 8 8 fL      Platelets 710 Thousands/uL      nRBC 0 /100 WBCs      Neutrophils Relative 59 %      Immat GRANS % 1 %      Lymphocytes Relative 24 %      Monocytes Relative 14 %      Eosinophils Relative 1 %      Basophils Relative 1 %      Neutrophils Absolute 3 46 Thousands/µL      Immature Grans Absolute 0 07 Thousand/uL      Lymphocytes Absolute 1 41 Thousands/µL      Monocytes Absolute 0 83 Thousand/µL      Eosinophils Absolute 0 05 Thousand/µL      Basophils Absolute 0 05 Thousands/µL                  X-ray chest 1 view portable    (Results Pending)         Procedures  ECG 12 Lead Documentation Only    Date/Time: 12/30/2021 9:28 AM  Performed by: Eamon Melendez MD  Authorized by: Eamon Melendez MD     Indications / Diagnosis:  Sob  ECG reviewed by me, the ED Provider: yes    Patient location:  ED  Previous ECG:     Previous ECG:  Compared to current    Similarity:  No change    Comparison to cardiac monitor: Yes    Interpretation:     Interpretation: normal    Rate:     ECG rate assessment: normal    Rhythm:     Rhythm: sinus rhythm    Ectopy:     Ectopy: none    ST segments:     ST segments:  Normal  T waves:     T waves: non-specific            ED Course             HEART Risk Score      Most Recent Value   Heart Score Risk Calculator    History 0 Filed at: 12/30/2021 0959   ECG 0 Filed at: 12/30/2021 9583   Age 0 Filed at: 12/30/2021 1327   Risk Factors 1 Filed at: 12/30/2021 0959   Troponin 0 Filed at: 12/30/2021 3961   HEART Score 1 Filed at: 12/30/2021 3110                      SBIRT 20yo+      Most Recent Value   SBIRT (25 yo +)    In order to provide better care to our patients, we are screening all of our patients for alcohol and drug use  Would it be okay to ask you these screening questions? Yes Filed at: 12/30/2021 0907   Initial Alcohol Screen: US AUDIT-C     1  How often do you have a drink containing alcohol? 0 Filed at: 12/30/2021 0907   2  How many drinks containing alcohol do you have on a typical day you are drinking? 0 Filed at: 12/30/2021 0907   3a  Male UNDER 65: How often do you have five or more drinks on one occasion? 0 Filed at: 12/30/2021 0907   3b  FEMALE Any Age, or MALE 65+: How often do you have 4 or more drinks on one occassion? 0 Filed at: 12/30/2021 6213   Audit-C Score 0 Filed at: 12/30/2021 9757   KATHY: How many times in the past year have you    Used an illegal drug or used a prescription medication for non-medical reasons? Never Filed at: 12/30/2021 0907                MDM  Number of Diagnoses or Management Options  Diagnosis management comments: 51-year-old male presents with new onset chest pain, dizziness, shortness of breath as well as loss of sense of taste  Likely acute febrile illness/COVID however given the new onset chest pain will rule out ACS    Likely discharge       Amount and/or Complexity of Data Reviewed  Clinical lab tests: ordered  Tests in the radiology section of CPT®: ordered  Tests in the medicine section of CPT®: ordered  Discussion of test results with the performing providers: yes  Decide to obtain previous medical records or to obtain history from someone other than the patient: yes  Review and summarize past medical records: yes  Discuss the patient with other providers: yes  Independent visualization of images, tracings, or specimens: yes    Risk of Complications, Morbidity, and/or Mortality  Presenting problems: moderate  Diagnostic procedures: moderate  Management options: moderate        Disposition  Final diagnoses:   COVID-19     Time reflects when diagnosis was documented in both MDM as applicable and the Disposition within this note     Time User Action Codes Description Comment    12/30/2021 10:08 AM Franky Elks Add [U07 1] COVID-19       ED Disposition     ED Disposition Condition Date/Time Comment    Discharge Stable Thu Dec 30, 2021 10:08 AM Maria Antonia Price discharge to home/self care  Follow-up Information     Follow up With Specialties Details Why Contact Info    Brianna Cueva DO Family Medicine Call today  [address] [facility] [place]  [address] [phone]            Patient's Medications   Discharge Prescriptions    BUDESONIDE (PULMICORT FLEXHALER) 180 MCG/ACT INHALER    Inhale 2 puffs 2 (two) times a day Rinse mouth after use  Start Date: 12/30/2021End Date: --       Order Dose: 2 puffs       Quantity: 1 each    Refills: 0     No discharge procedures on file  PDMP Review     None           ED Provider  Attending physically available and evaluated Maria Antonia Price  I managed the patient along with the ED Attending      Electronically Signed by         Eamon Melendez MD  12/30/21 5646

## 2021-12-30 NOTE — ED ATTENDING ATTESTATION
Final Diagnosis:  1  COVID-19      ED Course as of 12/30/21 1326   Thu Dec 30, 2021   7950 Procedure Note: EKG  Date/Time: 12/30/21 9:29 AM   Interpreted by: Catherine Motta  Indications / Diagnosis: CP  ECG reviewed by me, the ED Provider: yes   The EKG demonstrates:  Rhythm: normal sinus  Intervals: normal intervals  Axis: normal axis  QRS/Blocks: normal QRS  ST Changes: No acute ST Changes, no STD/BHARATH  No diffuse elevations to indicate pericarditis  No coved ST elevations greater than 2mm with negative T waves in V1-3 to indicate concern for brugada  No biphasic T waves in V2, V3 to indicate Wellens (critical stenosis of LAD)  No elevation in aVR or deviation when compared to V1 (can be associated with ST depression in I,II, V4-6 when left main occlusion is present)  8243 hs TnI 0hr: 2       I, Catherine Motta MD, saw and evaluated the patient  All available labs and X-rays were ordered by me or the resident and have been reviewed by myself  I discussed the patient with the resident / non-physician and agree with the resident's / non-physician practitioner's findings and plan as documented in the resident's / non-physician practicitioner's note, except where noted  At this point, I agree with the current assessment done in the ED  I was present during key portions of all procedures performed unless otherwise stated  Chief Complaint   Patient presents with    Dizziness     AWOKE THIS AM WITH DIZZINESS RIGHT SIDED CHEST AND ARM PAIN  LOSS TASTE     This is a 55 y o  male presenting for evaluation of malaise, loss of smell, right-sided chest and arm pain  Patient states that he has not felt well for last 2 days and then this morning started to have some pain in the right side of his chest that radiates into his right arm  History of pulmonary embolism in the past   Patient also tells me a 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 to fail  Patient denies any fever chills  Patient did receive 2 Moderna vaccinations  No known sick contacts  Patient states otherwise he has felt well  PMH:   has a past medical history of Allergic rhinitis, Asthma, Bipolar 1 disorder ([facility] [address] ), Chronic kidney disease, Diabetes mellitus ([facility] [address] ), Hematuria, Hyperlipidemia, Leukocytosis, Diana-Hussein tear, Pulmonary emboli ([facility] [address] ), [facility]ures [facility] ), and Sleep apnea  PSH:   has a past surgical history that includes Nasal septum surgery; Angioplasty; and Cardiac surgery  Social:  Social History     Substance and Sexual Activity   Alcohol Use Never    Alcohol/week: 0 0 standard drinks    Comment: occasional as per [employer]     Social History     Tobacco Use   Smoking Status Never Smoker   Smokeless Tobacco Former User    Types: Chew     Social History     Substance and Sexual Activity   Drug Use No     PE:  Vitals:    12/30/21 0901 12/30/21 0945 12/30/21 1000   BP: 127/94 139/82 141/86   BP Location: Left arm Left arm Left arm   Pulse: 97 92 88   Resp: 20 20 18   Temp: (!) 96 6 °F (35 9 °C)     TempSrc: Temporal     SpO2: 95% 95% 94%   Weight: 126 kg (278 lb)     Height:   5' 8" (1 727 m)       A:  -   General: VS reviewed  Appears in NAD  awake, alert  Head: Normocephalic, atraumatic  Eyes: EOM-I  No exudate  ENT: Atraumatic external nose and ears  No malocclusion  No stridor  Normal phonation  No drooling  Normal swallowing  Neck: No JVD  CV: No pallor noted  Lungs:   No tachypnea  No respiratory distress    Abdomen:  Soft, non-tender, non-distended    MSK:   FROM spontaneously  No obvious deformity    Skin: Dry, intact  No obvious rash  Neuro: Awake, alert, GCS15, CN II-XII grossly intact  Speaking in full sentences  Motor grossly intact      Psychiatric/Behavioral: Appropriate mood and affect   Exam: deferred  P:  - evaluate for ACS, COVID, PE given history  - EKG without ischemic changes  -anticipate discharge with return precautions  -patient found to be COVID positive, non hypoxic, discussed further evaluation versus discharge  Patient comfortable with discharge home  - 13 point ROS was performed and all are normal unless stated in the history above  - Nursing note reviewed  Vitals reviewed  - Orders placed by myself and/or advanced practitioner / resident     - Previous chart was reviewed  - No language barrier    - History obtained from patient  - There are no limitations to the history obtained  Code Status: Prior  Advance Directive and Living Will:      Power of :    POLST:      Medications   sodium chloride 0 9 % bolus 1,000 mL (0 mL Intravenous Stopped 12/30/21 1037)     X-ray chest 1 view portable   Final Result      No acute cardiopulmonary disease  Workstation performed: [id number]           Orders Placed This Encounter   Procedures    ED ECG Documentation Only    COVID/FLU/RSV    X-ray chest 1 view portable    CBC and differential    Basic metabolic panel    HS Troponin 0hr (reflex protocol)    D-dimer, quantitative    Continuous cardiac monitoring    Continuous pulse oximetry    EKG RESULTS    ECG 12 lead    ECG 12 lead     Labs Reviewed   COVID19, INFLUENZA A/B, RSV PCR, [facility] - Abnormal       Result Value Ref Range Status    SARS-CoV-2 Positive (*) Negative Final    INFLUENZA A PCR Negative  Negative Final    INFLUENZA B PCR Negative  Negative Final    RSV PCR Negative  Negative Final    Narrative:     FOR PEDIATRIC PATIENTS - copy/paste COVID Guidelines URL to browser: [email or web address]  [employer]     This test has been authorized by FDA under an EUA (Emergency Use Assay) for use by authorized laboratories  Clinical caution and judgement should be used with the interpretation of these results with consideration of the clinical impression and other laboratory testing    Testing reported as "Positive" or "Negative" has been proven to be accurate according to standard laboratory validation requirements  All testing is performed with control materials showing appropriate reactivity at standard intervals  CBC AND DIFFERENTIAL - Abnormal    WBC 5 87  4 31 - 10 16 Thousand/uL Final    RBC 4 91  3 88 - 5 62 Million/uL Final    Hemoglobin 14 2  12 0 - 17 0 g/dL Final    Hematocrit 40 9  36 5 - 49 3 % Final    MCV 83  82 - 98 fL Final    MCH 28 9  26 8 - 34 3 pg Final    MCHC 34 7  31 4 - 37 4 g/dL Final    RDW 13 2  11 6 - 15 1 % Final    MPV 8 8 (*) 8 9 - 12 7 fL Final    Platelets 269  467 - 390 Thousands/uL Final    nRBC 0  /100 WBCs Final    Neutrophils Relative 59  43 - 75 % Final    Immat GRANS % 1  0 - 2 % Final    Lymphocytes Relative 24  14 - 44 % Final    Monocytes Relative 14 (*) 4 - 12 % Final    Eosinophils Relative 1  0 - 6 % Final    Basophils Relative 1  0 - 1 % Final    Neutrophils Absolute 3 46  1 85 - 7 62 Thousands/µL Final    Immature Grans Absolute 0 07  0 00 - 0 20 Thousand/uL Final    Lymphocytes Absolute 1 41  0 60 - 4 47 Thousands/µL Final    Monocytes Absolute 0 83  0 17 - 1 22 Thousand/µL Final    Eosinophils Absolute 0 05  0 00 - 0 61 Thousand/µL Final    Basophils Absolute 0 05  0 00 - 0 10 Thousands/µL Final   BASIC METABOLIC PANEL - Abnormal    Sodium 133 (*) 136 - 145 mmol/L Final    Potassium 4 1  3 5 - 5 3 mmol/L Final    Chloride 95 (*) 100 - 108 mmol/L Final    CO2 26  21 - 32 mmol/L Final    ANION GAP 12  4 - 13 mmol/L Final    BUN 16  5 - 25 mg/dL Final    Creatinine 1 06  0 60 - 1 30 mg/dL Final    Comment: Standardized to IDMS reference method    Glucose 205 (*) 65 - 140 mg/dL Final    Comment: If the patient is fasting, the ADA then defines impaired fasting glucose as > 100 mg/dL and diabetes as > or equal to 123 mg/dL  Specimen collection should occur prior to Sulfasalazine administration due to the potential for falsely depressed results   Specimen collection should occur prior to Sulfapyridine administration due to the potential for falsely elevated results  Calcium 9 2  8 3 - 10 1 mg/dL Final    eGFR 83  ml/min/1 73sq m Final    Narrative:     [place] guidelines for Chronic Kidney Disease (CKD):     Stage 1 with normal or high GFR (GFR > 90 mL/min/1 73 square meters)    Stage 2 Mild CKD (GFR = 60-89 mL/min/1 73 square meters)    Stage 3A Moderate CKD (GFR = 45-59 mL/min/1 73 square meters)    Stage 3B Moderate CKD (GFR = 30-44 mL/min/1 73 square meters)    Stage 4 Severe CKD (GFR = 15-29 mL/min/1 73 square meters)    Stage 5 End Stage CKD (GFR <15 mL/min/1 73 square meters)  Note: GFR calculation is accurate only with a steady state creatinine   D-DIMER, QUANTITATIVE - Abnormal    D-Dimer, Quant 0 50 (*) <0 50 ug/ml FEU Final    Comment: Reference and upper limits to exclude DVT and PE are the same  Do not use to exclude if clinical symptoms are present  HS TROPONIN I 0HR - Normal    hs TnI 0hr 2  "Refer to ACS Flowchart"- see link ng/L Final    Comment:                                              Initial (time 0) result  If >=50 ng/L, Myocardial injury suggested ;  Type of myocardial injury and treatment strategy  to be determined  If 5-49 ng/L, a delta result at 2 hours and or 4 hours will be needed to further evaluate  If <4 ng/L, and chest pain has been >3 hours since onset, patient may qualify for discharge based on the HEART score in the ED  If <5 ng/L and <3hours since onset of chest pain, a delta result at 2 hours will be needed to further evaluate  Second Troponin (time 2 hours)  If calculated delta >= 20 ng/L,  Myocardial injury suggested ; Type of myocardial injury and treatment strategy to be determined  If 5-49 ng/L and the calculated delta is 5-19 ng/L, consult medical service for evaluation  Continue evaluation for ischemia on ecg and other possible etiology and repeat hs troponin at 4 hours    If delta is <5 ng/L at 2 hours, consider discharge based on risk stratification via the HEART score (if in ED), or MARTINEZ risk score in IP/Observation  Time reflects when diagnosis was documented in both MDM as applicable and the Disposition within this note     Time User Action Codes Description Comment    12/30/2021 10:08 AM Laney Brown Add [U07 1] COVID-19       ED Disposition     ED Disposition Condition Date/Time Comment    Discharge Stable Thu Dec 30, 2021 10:08 AM Jai Chowdavid discharge to home/self care              Follow-up Information     Follow up With Specialties Details Why [address], DO Family Medicine Call today  [address] ([place] [address]) [address] [facility],  [address]  [phone]          Discharge Medication List as of 12/30/2021 10:08 AM      CONTINUE these medications which have NOT CHANGED    Details   aspirin 81 mg chewable tablet Chew 81 mg daily, Historical Med      atorvastatin (LIPITOR) 10 mg tablet Take 1 tablet (10 mg total) by mouth daily, Starting Mon 12/20/2021, Normal      Blood Glucose Monitoring Suppl (ONE TOUCH ULTRA 2) w/Device KIT by Does not apply route 2 (two) times a day, Starting Wed 8/12/2020, Normal      divalproex sodium (DEPAKOTE) 500 mg EC tablet Take 500 mg by mouth 2 (two) times a day , Until Discontinued, Historical Med      Empagliflozin (Jardiance) 25 MG TABS Take 1 tablet (25 mg total) by mouth every morning, Starting Fri 8/6/2021, Normal      glucose blood (OneTouch Ultra) test strip Test sugar BID, Normal      Lancets (onetouch ultrasoft) lancets Test sugar BID, Normal      lisinopril (ZESTRIL) 10 mg tablet take 1 tablet by mouth once daily, Normal      magnesium oxide (MAG-OX) 400 mg Take 1 tablet (400 mg total) by mouth daily, Starting Fri 8/6/2021, Normal      metFORMIN (GLUCOPHAGE) 1000 MG tablet Take 1 tablet (1,000 mg total) by mouth 2 (two) times a day, Starting Mon 12/20/2021, Normal      risperiDONE (RisperDAL) 2 mg tablet Take 1 tablet (2 mg total) by mouth 2 (two) times a day, Starting Mon 9/17/2018, No Print           No discharge procedures on file  Prior to Admission Medications   Prescriptions Last Dose Informant Patient Reported? Taking? Blood Glucose Monitoring Suppl (ONE TOUCH ULTRA 2) w/Device KIT   No No   Sig: by Does not apply route 2 (two) times a day   Empagliflozin (Jardiance) 25 MG TABS   No No   Sig: Take 1 tablet (25 mg total) by mouth every morning   Lancets (onetouch ultrasoft) lancets   No No   Sig: Test sugar BID   aspirin 81 mg chewable tablet   Yes No   Sig: Chew 81 mg daily   atorvastatin (LIPITOR) 10 mg tablet   No No   Sig: Take 1 tablet (10 mg total) by mouth daily   divalproex sodium (DEPAKOTE) 500 mg EC tablet   Yes No   Sig: Take 500 mg by mouth 2 (two) times a day  glucose blood (OneTouch Ultra) test strip   No No   Sig: Test sugar BID   lisinopril (ZESTRIL) 10 mg tablet   No No   Sig: take 1 tablet by mouth once daily   magnesium oxide (MAG-OX) 400 mg   No No   Sig: Take 1 tablet (400 mg total) by mouth daily   metFORMIN (GLUCOPHAGE) 1000 MG tablet   No No   Sig: Take 1 tablet (1,000 mg total) by mouth 2 (two) times a day   risperiDONE (RisperDAL) 2 mg tablet  Self No No   Sig: Take 1 tablet (2 mg total) by mouth 2 (two) times a day   Patient taking differently: Take 2 mg by mouth daily       Facility-Administered Medications: None       Portions of the record may have been created with voice recognition software  Occasional wrong word or "sound a like" substitutions may have occurred due to the inherent limitations of voice recognition software  Read the chart carefully and recognize, using context, where substitutions have occurred      Electronically signed by:  Marleni Sweeney

## 2022-04-18 DIAGNOSIS — E11.9 TYPE 2 DIABETES MELLITUS WITHOUT COMPLICATION, WITHOUT LONG-TERM CURRENT USE OF INSULIN (HCC): ICD-10-CM

## 2022-04-18 RX ORDER — LISINOPRIL 10 MG/1
10 TABLET ORAL DAILY
Qty: 30 TABLET | Refills: 5 | Status: SHIPPED | OUTPATIENT
Start: 2022-04-18

## 2022-05-05 ENCOUNTER — APPOINTMENT (OUTPATIENT)
Dept: LAB | Facility: HOSPITAL | Age: 47
End: 2022-05-05
Payer: COMMERCIAL

## 2022-05-05 DIAGNOSIS — Z79.899 ENCOUNTER FOR LONG-TERM (CURRENT) USE OF OTHER MEDICATIONS: ICD-10-CM

## 2022-05-05 LAB
25(OH)D3 SERPL-MCNC: 30.7 NG/ML (ref 30–100)
ALBUMIN SERPL BCP-MCNC: 3.2 G/DL (ref 3.5–5)
ALP SERPL-CCNC: 52 U/L (ref 46–116)
ALT SERPL W P-5'-P-CCNC: 36 U/L (ref 12–78)
ANION GAP SERPL CALCULATED.3IONS-SCNC: 8 MMOL/L (ref 4–13)
AST SERPL W P-5'-P-CCNC: 20 U/L (ref 5–45)
BASOPHILS # BLD AUTO: 0.04 THOUSANDS/ΜL (ref 0–0.1)
BASOPHILS NFR BLD AUTO: 1 % (ref 0–1)
BILIRUB SERPL-MCNC: 0.38 MG/DL (ref 0.2–1)
BUN SERPL-MCNC: 11 MG/DL (ref 5–25)
CALCIUM ALBUM COR SERPL-MCNC: 9.4 MG/DL (ref 8.3–10.1)
CALCIUM SERPL-MCNC: 8.8 MG/DL (ref 8.3–10.1)
CHLORIDE SERPL-SCNC: 99 MMOL/L (ref 100–108)
CHOLEST SERPL-MCNC: 149 MG/DL
CO2 SERPL-SCNC: 28 MMOL/L (ref 21–32)
CREAT SERPL-MCNC: 0.95 MG/DL (ref 0.6–1.3)
EOSINOPHIL # BLD AUTO: 0.19 THOUSAND/ΜL (ref 0–0.61)
EOSINOPHIL NFR BLD AUTO: 3 % (ref 0–6)
ERYTHROCYTE [DISTWIDTH] IN BLOOD BY AUTOMATED COUNT: 13.4 % (ref 11.6–15.1)
FOLATE SERPL-MCNC: 10.4 NG/ML (ref 3.1–17.5)
GFR SERPL CREATININE-BSD FRML MDRD: 95 ML/MIN/1.73SQ M
GLUCOSE P FAST SERPL-MCNC: 236 MG/DL (ref 65–99)
HCT VFR BLD AUTO: 42.1 % (ref 36.5–49.3)
HDLC SERPL-MCNC: 37 MG/DL
HGB BLD-MCNC: 14.3 G/DL (ref 12–17)
IMM GRANULOCYTES # BLD AUTO: 0.04 THOUSAND/UL (ref 0–0.2)
IMM GRANULOCYTES NFR BLD AUTO: 1 % (ref 0–2)
LDLC SERPL CALC-MCNC: 80 MG/DL (ref 0–100)
LYMPHOCYTES # BLD AUTO: 2.25 THOUSANDS/ΜL (ref 0.6–4.47)
LYMPHOCYTES NFR BLD AUTO: 35 % (ref 14–44)
MCH RBC QN AUTO: 28.6 PG (ref 26.8–34.3)
MCHC RBC AUTO-ENTMCNC: 34 G/DL (ref 31.4–37.4)
MCV RBC AUTO: 84 FL (ref 82–98)
MONOCYTES # BLD AUTO: 0.28 THOUSAND/ΜL (ref 0.17–1.22)
MONOCYTES NFR BLD AUTO: 4 % (ref 4–12)
NEUTROPHILS # BLD AUTO: 3.66 THOUSANDS/ΜL (ref 1.85–7.62)
NEUTS SEG NFR BLD AUTO: 56 % (ref 43–75)
NONHDLC SERPL-MCNC: 112 MG/DL
NRBC BLD AUTO-RTO: 0 /100 WBCS
PLATELET # BLD AUTO: 195 THOUSANDS/UL (ref 149–390)
PMV BLD AUTO: 9.2 FL (ref 8.9–12.7)
POTASSIUM SERPL-SCNC: 3.7 MMOL/L (ref 3.5–5.3)
PROT SERPL-MCNC: 7.4 G/DL (ref 6.4–8.2)
RBC # BLD AUTO: 5 MILLION/UL (ref 3.88–5.62)
SODIUM SERPL-SCNC: 135 MMOL/L (ref 136–145)
TRIGL SERPL-MCNC: 158 MG/DL
TSH SERPL DL<=0.05 MIU/L-ACNC: 2.1 UIU/ML (ref 0.45–4.5)
VALPROATE SERPL-MCNC: 35 UG/ML (ref 50–100)
VIT B12 SERPL-MCNC: 310 PG/ML (ref 100–900)
WBC # BLD AUTO: 6.46 THOUSAND/UL (ref 4.31–10.16)

## 2022-05-05 PROCEDURE — 82746 ASSAY OF FOLIC ACID SERUM: CPT

## 2022-05-05 PROCEDURE — 82607 VITAMIN B-12: CPT

## 2022-05-05 PROCEDURE — 80061 LIPID PANEL: CPT

## 2022-05-05 PROCEDURE — 85025 COMPLETE CBC W/AUTO DIFF WBC: CPT

## 2022-05-05 PROCEDURE — 36415 COLL VENOUS BLD VENIPUNCTURE: CPT

## 2022-05-05 PROCEDURE — 82306 VITAMIN D 25 HYDROXY: CPT

## 2022-05-05 PROCEDURE — 84443 ASSAY THYROID STIM HORMONE: CPT

## 2022-05-05 PROCEDURE — 80053 COMPREHEN METABOLIC PANEL: CPT

## 2022-05-05 PROCEDURE — 80164 ASSAY DIPROPYLACETIC ACD TOT: CPT

## 2022-05-05 PROCEDURE — 80307 DRUG TEST PRSMV CHEM ANLYZR: CPT

## 2022-05-16 LAB
Lab: NORMAL
MISCELLANEOUS LAB TEST RESULT: NORMAL
STONE ANALYSIS-IMP: NORMAL

## 2022-07-07 ENCOUNTER — OFFICE VISIT (OUTPATIENT)
Dept: URGENT CARE | Facility: MEDICAL CENTER | Age: 47
End: 2022-07-07
Payer: COMMERCIAL

## 2022-07-07 VITALS
TEMPERATURE: 97.7 F | DIASTOLIC BLOOD PRESSURE: 70 MMHG | HEART RATE: 86 BPM | SYSTOLIC BLOOD PRESSURE: 140 MMHG | OXYGEN SATURATION: 97 % | HEIGHT: 68 IN | RESPIRATION RATE: 20 BRPM | WEIGHT: 274 LBS | BODY MASS INDEX: 41.52 KG/M2

## 2022-07-07 DIAGNOSIS — R05.9 COUGH: ICD-10-CM

## 2022-07-07 DIAGNOSIS — J20.9 ACUTE BRONCHITIS, UNSPECIFIED ORGANISM: Primary | ICD-10-CM

## 2022-07-07 DIAGNOSIS — J01.40 ACUTE NON-RECURRENT PANSINUSITIS: ICD-10-CM

## 2022-07-07 LAB
SARS-COV-2 AG UPPER RESP QL IA: NEGATIVE
VALID CONTROL: NORMAL

## 2022-07-07 PROCEDURE — 87811 SARS-COV-2 COVID19 W/OPTIC: CPT | Performed by: NURSE PRACTITIONER

## 2022-07-07 PROCEDURE — 99213 OFFICE O/P EST LOW 20 MIN: CPT | Performed by: NURSE PRACTITIONER

## 2022-07-07 RX ORDER — ALBUTEROL SULFATE 90 UG/1
2 AEROSOL, METERED RESPIRATORY (INHALATION) EVERY 4 HOURS PRN
Qty: 8.5 G | Refills: 2 | Status: SHIPPED | OUTPATIENT
Start: 2022-07-07

## 2022-07-07 RX ORDER — BUDESONIDE 90 UG/1
2 AEROSOL, POWDER RESPIRATORY (INHALATION) 2 TIMES DAILY
Qty: 1 EACH | Refills: 2 | Status: SHIPPED | OUTPATIENT
Start: 2022-07-07

## 2022-07-07 RX ORDER — AZITHROMYCIN 250 MG/1
TABLET, FILM COATED ORAL
Qty: 6 TABLET | Refills: 0 | Status: SHIPPED | OUTPATIENT
Start: 2022-07-07 | End: 2022-07-11

## 2022-07-07 NOTE — PROGRESS NOTES
[facility]        NAME: Noemí Mae is a 55 y o  male  : 1975    MRN: [id number]  DATE: 2022  TIME: 5:10 PM      Assessment and Plan     Acute bronchitis, unspecified organism [J20 9]  1  Acute bronchitis, unspecified organism  albuterol (ProAir HFA) 90 mcg/act inhaler    budesonide (Pulmicort Flexhaler) 90 MCG/ACT inhaler    azithromycin (ZITHROMAX) 250 mg tablet   2  Cough  Poct Covid 19 Rapid Antigen Test    albuterol (ProAir HFA) 90 mcg/act inhaler    budesonide (Pulmicort Flexhaler) 90 MCG/ACT inhaler    azithromycin (ZITHROMAX) 250 mg tablet   3  Acute non-recurrent pansinusitis  azithromycin (ZITHROMAX) 250 mg tablet         Patient Instructions     Patient Instructions     Take the azithromycin (antibiotic) as ordered until completed  Start the pulmicort and take as directed, rinsing mouth after use  Use the albuterol/rescue inhaler every 4-6 hours as needed for shortness of breath, wheezing, chest tightness or persistent cough  Acute Bronchitis   AMBULATORY CARE:   Acute bronchitis  is swelling and irritation in your lungs  It is usually caused by a virus and most often happens in the winter  Bronchitis may also be caused by bacteria or by a chemical irritant, such as smoke  Common symptoms include the following:   · Cough that lasts up to 3 weeks, stuffy nose    · Hoarseness, sore throat    · A fever and chills    · Feeling more tired than usual, and body aches    · Wheezing or pain when you breathe or cough    Seek care immediately if:   · You cough up blood  · Your lips or fingernails turn blue  · You feel like you are not getting enough air when you breathe  Call your doctor if:   · Your symptoms do not go away or get worse, even after treatment  · Your cough does not get better within 4 weeks  · You have questions or concerns about your condition or care  Medicines:   You may  need any of the following:  · Cough suppressants  decrease your urge to cough     · Decongestants  help loosen mucus in your lungs and make it easier to cough up  This can help you breathe easier  · Inhalers  may be given  Your healthcare provider may give you one or more inhalers to help you breathe easier and cough less  An inhaler gives your medicine to open your airways  Ask your healthcare provider to show you how to use your inhaler correctly  · Antibiotics  may be given for up to 5 days if your bronchitis is caused by bacteria  · Acetaminophen  decreases pain and fever  It is available without a doctor's order  Ask how much to take and how often to take it  Follow directions  Read the labels of all other medicines you are using to see if they also contain acetaminophen, or ask your doctor or pharmacist  Acetaminophen can cause liver damage if not taken correctly  Do not use more than 4 grams (4,000 milligrams) total of acetaminophen in one day  · NSAIDs  help decrease swelling and pain or fever  This medicine is available with or without a doctor's order  NSAIDs can cause stomach bleeding or kidney problems in certain people  If you take blood thinner medicine, always ask your healthcare provider if NSAIDs are safe for you  Always read the medicine label and follow directions  · Take your medicine as directed  Contact your healthcare provider if you think your medicine is not helping or if you have side effects  Tell him of her if you are allergic to any medicine  Keep a list of the medicines, vitamins, and herbs you take  Include the amounts, and when and why you take them  Bring the list or the pill bottles to follow-up visits  Carry your medicine list with you in case of an emergency  Self-care:   · Drink liquids as directed  You may need to drink more liquids than usual to stay hydrated  Ask how much liquid to drink each day and which liquids are best for you  · Use a cool mist humidifier  to increase air moisture in your home   This may make it easier for you to breathe and help decrease your cough  · Get more rest   Rest helps your body to heal  Slowly start to do more each day  Rest when you feel it is needed  · Avoid irritants in the air  Avoid chemicals, fumes, and dust  Wear a face mask if you must work around dust or fumes  Stay inside on days when air pollution levels are high  If you have allergies, stay inside when pollen counts are high  Do not use aerosol products, such as spray-on deodorant, bug spray, and hair spray  · Do not smoke or be around others who are smoking  Nicotine and other chemicals in cigarettes and cigars can cause lung damage  Ask your healthcare provider for information if you currently smoke and need help to quit  E-cigarettes or smokeless tobacco still contain nicotine  Talk to your healthcare provider before you use these products  Prevent acute bronchitis:       1  Ask about vaccines you may need  Get a flu vaccine each year as soon as recommended, usually in September or October  Ask your healthcare provider if you should also get a pneumonia or COVID-19 vaccine  Your healthcare provider can tell you if you should also get other vaccines, and when to get them  2  Prevent the spread of germs  You can decrease your risk for acute bronchitis and other illnesses by doing the following:    ? Wash your hands often with soap and water  Carry germ-killing hand lotion or gel with you  You can use the lotion or gel to clean your hands when soap and water are not available  ? Do not touch your eyes, nose, or mouth unless you have washed your hands first     ? Always cover your mouth when you cough to prevent the spread of germs  It is best to cough into a tissue or your shirt sleeve instead of into your hand  Ask those around you to cover their mouths when they cough  ? Try to avoid people who have a cold or the flu  If you are sick, stay away from others as much as possible      Follow up with your doctor as directed:  Write down questions you have so you will remember to ask them during your follow-up visits  © Copyright [employer] 2022 Information is for End User's use only and may not be sold, redistributed or otherwise used for commercial purposes  All illustrations and images included in CareNotes® are the copyrighted property of A [employer] M [employer]  or David Calero  The above information is an  only  It is not intended as medical advice for individual conditions or treatments  Talk to your doctor, nurse or pharmacist before following any medical regimen to see if it is safe and effective for you  Sinusitis   AMBULATORY CARE:   Sinusitis  is inflammation or infection of your sinuses  Sinusitis is most often caused by a virus  Acute sinusitis may last up to 12 weeks  Chronic sinusitis lasts longer than 12 weeks  Recurrent sinusitis means you have 4 or more infections in 1 year  Common signs and symptoms:   · Fever    · Pain, pressure, redness, or swelling around the forehead, cheeks, or eyes    · Thick yellow or green discharge from your nose    · Tenderness when you touch your face over your sinuses    · Dry cough that happens mostly at night or when you lie down    · Headache and face pain that is worse when you lean forward    · Tooth pain, or pain when you chew    Seek care immediately if:   · You have trouble breathing or wheezing that is getting worse  · You have a stiff neck, a fever, or a bad headache  · You cannot open your eye  · Your eyeball bulges out or you cannot move your eye  · You are more sleepy than normal, or you notice changes in your ability to think, move, or talk  · You have swelling of your forehead or scalp  Call your doctor if:   · You have vision changes, such as double vision  · Your eye and eyelid are red, swollen, and painful  · Your symptoms do not improve or go away after 10 days      · You have nausea and are vomiting  · Your nose is bleeding  · You have questions or concerns about your condition or care  Medicines: Your symptoms may go away on their own  Your healthcare provider may recommend watchful waiting for up to 10 days before starting antibiotics  You may need any of the following:  · Acetaminophen  decreases pain and fever  It is available without a doctor's order  Ask how much to take and how often to take it  Follow directions  Read the labels of all other medicines you are using to see if they also contain acetaminophen, or ask your doctor or pharmacist  Acetaminophen can cause liver damage if not taken correctly  Do not use more than 4 grams (4,000 milligrams) total of acetaminophen in one day  · NSAIDs , such as ibuprofen, help decrease swelling, pain, and fever  This medicine is available with or without a doctor's order  NSAIDs can cause stomach bleeding or kidney problems in certain people  If you take blood thinner medicine, always ask your healthcare provider if NSAIDs are safe for you  Always read the medicine label and follow directions  · Nasal steroid sprays  may help decrease inflammation in your nose and sinuses  · Decongestants  help reduce swelling and drain mucus in the nose and sinuses  They may help you breathe easier  · Antihistamines  help dry mucus in the nose and relieve sneezing  · Antibiotics  help treat or prevent a bacterial infection  Self-care:   · Rinse your sinuses as directed  Use a sinus rinse device to rinse your nasal passages with a saline (salt water) solution or distilled water  Do not use tap water  This will help thin the mucus in your nose and rinse away pollen and dirt  It will also help reduce swelling so you can breathe normally  · Use a humidifier  to increase air moisture in your home  This may make it easier for you to breathe and help decrease your cough  · Sleep with your head elevated    Place an extra pillow under your head before you go to sleep to help your sinuses drain  · Drink liquids as directed  Ask your healthcare provider how much liquid to drink each day and which liquids are best for you  Liquids will thin the mucus in your nose and help it drain  Avoid drinks that contain alcohol or caffeine  · Do not smoke, and avoid secondhand smoke  Nicotine and other chemicals in cigarettes and cigars can make your symptoms worse  Ask your healthcare provider for information if you currently smoke and need help to quit  E-cigarettes or smokeless tobacco still contain nicotine  Talk to your healthcare provider before you use these products  Prevent the spread of germs:   · Wash your hands often with soap and water  Wash your hands after you use the bathroom, change a child's diaper, or sneeze  Wash your hands before you prepare or eat food  · Stay away from people who are sick  Some germs spread easily and quickly through contact  Follow up with your doctor as directed: You may be referred to an ear, nose, and throat specialist  Write down your questions so you remember to ask them during your visits  © Copyright [employer] 2022 Information is for End User's use only and may not be sold, redistributed or otherwise used for commercial purposes  All illustrations and images included in CareNotes® are the copyrighted property of [employer] A M , Inc  or [address] [place] [address]  The above information is an  only  It is not intended as medical advice for individual conditions or treatments  Talk to your doctor, nurse or pharmacist before following any medical regimen to see if it is safe and effective for you  Follow up with PCP in 3-5 days  Proceed to  ER if symptoms worsen      Chief Complaint     Chief Complaint   Patient presents with    Cold Like Symptoms     Cough and dizziness x 2 weeks           History of Present Illness     Patient presents reporting congestion, cough, chest tightness x 2 weeks, gradually worsening, not clearing up  Notes SOB  Father also recently ill w/ similar symptoms and dx w/ either bronchitis or pneumonia  Patient has a hx of asthma--he is out of inhalers, told by pharmacy PCP dc'd them; not clarified w/ PCP  Review of Systems     Review of Systems   HENT: Positive for congestion, postnasal drip, sinus pressure and sinus pain  Respiratory: Positive for cough, chest tightness, shortness of breath and wheezing  Neurological: Positive for dizziness and headaches  All other systems reviewed and are negative          Current Medications       Current Outpatient Medications:     albuterol (ProAir HFA) 90 mcg/act inhaler, Inhale 2 puffs every 4 (four) hours as needed for wheezing or shortness of breath, Disp: 8 5 g, Rfl: 2    aspirin 81 mg chewable tablet, Chew 81 mg daily, Disp: , Rfl:     atorvastatin (LIPITOR) 10 mg tablet, Take 1 tablet (10 mg total) by mouth daily, Disp: 30 tablet, Rfl: 5    azithromycin (ZITHROMAX) 250 mg tablet, Take 2 tablets today then 1 tablet daily x 4 days, Disp: 6 tablet, Rfl: 0    Blood Glucose Monitoring Suppl (ONE TOUCH ULTRA 2) w/Device KIT, by Does not apply route 2 (two) times a day, Disp: 1 each, Rfl: 0    budesonide (Pulmicort Flexhaler) 90 MCG/ACT inhaler, Inhale 2 puffs 2 (two) times a day Rinse mouth after use , Disp: 1 each, Rfl: 2    divalproex sodium (DEPAKOTE) 500 mg EC tablet, Take 500 mg by mouth 2 (two) times a day , Disp: , Rfl:     glucose blood (OneTouch Ultra) test strip, Test sugar BID, Disp: 100 each, Rfl: 5    Lancets (onetouch ultrasoft) lancets, Test sugar BID, Disp: 100 each, Rfl: 5    lisinopril (ZESTRIL) 10 mg tablet, Take 1 tablet (10 mg total) by mouth daily, Disp: 30 tablet, Rfl: 5    magnesium oxide (MAG-OX) 400 mg, Take 1 tablet (400 mg total) by mouth daily, Disp: 30 tablet, Rfl: 5    metFORMIN (GLUCOPHAGE) 1000 MG tablet, Take 1 tablet (1,000 mg total) by mouth 2 (two) times a day, Disp: 60 tablet, Rfl: 5    risperiDONE (RisperDAL) 2 mg tablet, Take 1 tablet (2 mg total) by mouth 2 (two) times a day (Patient taking differently: Take 2 mg by mouth daily), Disp: 30 tablet, Rfl: 0    Empagliflozin (Jardiance) 25 MG TABS, Take 1 tablet (25 mg total) by mouth every morning, Disp: 30 tablet, Rfl: 5    Current Allergies     Allergies as of 07/07/2022 - Reviewed 07/07/2022   Allergen Reaction Noted    Coconut oil - food allergy Throat Swelling 06/17/2021    Epinephrine Other (See Comments) 07/28/2016    Iodine - food allergy Other (See Comments)     Other Other (See Comments) 07/28/2016    Shellfish-derived products - food allergy GI Intolerance 05/09/2012              The following portions of the patient's history were reviewed and updated as appropriate: allergies, current medications, past family history, past medical history, past social history, past surgical history and problem list      Past Medical History:   Diagnosis Date    Allergic rhinitis     resolved 6/20/17    Asthma     resolved 6/20/17    Bipolar 1 disorder ([address] )     resolved 6/20/17    Chronic kidney disease     Diabetes mellitus ([address] )     Hematuria     resolved 6/20/17    Hyperlipidemia     Leukocytosis     resolved 6/20/17    Diana-Hussein tear     Pulmonary emboli ([address] )     resolved 6/20/17    Seizures ([address] )     Sleep apnea     resolved 6/20/17       Past Surgical History:   Procedure Laterality Date    ANGIOPLASTY      previous stent placement    CARDIAC SURGERY      NASAL SEPTUM SURGERY         Family History   Problem Relation Age of Onset    Cancer Mother     Diabetes Mother     Hypertension Mother     Asthma Father     Cancer Paternal Grandfather     Colon cancer Family          Medications have been verified  Objective     /70   Pulse 86   Temp 97 7 °F (36 5 °C)   Resp 20   Ht 5' 8" (1 727 m)   Wt 124 kg (274 lb)   SpO2 97%   BMI 41 66 kg/m²   No LMP for male patient  Physical Exam     Physical Exam  Vitals and nursing note reviewed  Constitutional:       General: He is not in acute distress  Appearance: Normal appearance  He is well-developed and well-groomed  He is ill-appearing (mild)  He is not toxic-appearing or diaphoretic  HENT:      Head: Normocephalic and atraumatic  Right Ear: Hearing, tympanic membrane, ear canal and external ear normal  No decreased hearing noted  No tenderness  Tympanic membrane is not erythematous or bulging  Left Ear: Hearing, tympanic membrane, ear canal and external ear normal  No decreased hearing noted  No tenderness  Tympanic membrane is not erythematous or bulging  Nose: Mucosal edema and congestion present  Right Sinus: Maxillary sinus tenderness and frontal sinus tenderness present  Left Sinus: Maxillary sinus tenderness and frontal sinus tenderness present  Comments: Also tender over b/l ethmoid and sphenoid sinuses       Mouth/Throat:      Mouth: Mucous membranes are moist       Pharynx: Oropharynx is clear  Uvula midline  No oropharyngeal exudate or posterior oropharyngeal erythema  Eyes:      Pupils: Pupils are equal, round, and reactive to light  Cardiovascular:      Rate and Rhythm: Normal rate and regular rhythm  No extrasystoles are present  Heart sounds: Normal heart sounds, S1 normal and S2 normal  No murmur heard  No friction rub  No gallop  Pulmonary:      Effort: Pulmonary effort is normal  No tachypnea, bradypnea, accessory muscle usage, prolonged expiration, respiratory distress or retractions  Breath sounds: Normal air entry  No stridor or decreased air movement  Wheezing (mild I&E wheezes) present  No decreased breath sounds, rhonchi or rales  Chest:      Chest wall: No tenderness  Abdominal:      General: Bowel sounds are normal  There is no distension  Palpations: Abdomen is soft  Tenderness: There is no abdominal tenderness   There is no guarding or rebound  Musculoskeletal:         General: Normal range of motion  Cervical back: Normal range of motion and neck supple  Lymphadenopathy:      Cervical: Cervical adenopathy present  Skin:     General: Skin is warm and dry  Capillary Refill: Capillary refill takes less than 2 seconds  Neurological:      General: No focal deficit present  Mental Status: He is alert and oriented to person, place, and time  Psychiatric:         Mood and Affect: Mood normal          Behavior: Behavior normal  Behavior is cooperative  Thought Content:  Thought content normal          Judgment: Judgment normal

## 2022-07-07 NOTE — PATIENT INSTRUCTIONS
Take the azithromycin (antibiotic) as ordered until completed  Start the pulmicort and take as directed, rinsing mouth after use  Use the albuterol/rescue inhaler every 4-6 hours as needed for shortness of breath, wheezing, chest tightness or persistent cough  Acute Bronchitis   AMBULATORY CARE:   Acute bronchitis  is swelling and irritation in your lungs  It is usually caused by a virus and most often happens in the winter  Bronchitis may also be caused by bacteria or by a chemical irritant, such as smoke  Common symptoms include the following:   Cough that lasts up to 3 weeks, stuffy nose    Hoarseness, sore throat    A fever and chills    Feeling more tired than usual, and body aches    Wheezing or pain when you breathe or cough    Seek care immediately if:   You cough up blood  Your lips or fingernails turn blue  You feel like you are not getting enough air when you breathe  Call your doctor if:   Your symptoms do not go away or get worse, even after treatment  Your cough does not get better within 4 weeks  You have questions or concerns about your condition or care  Medicines: You may  need any of the following:  Cough suppressants  decrease your urge to cough  Decongestants  help loosen mucus in your lungs and make it easier to cough up  This can help you breathe easier  Inhalers  may be given  Your healthcare provider may give you one or more inhalers to help you breathe easier and cough less  An inhaler gives your medicine to open your airways  Ask your healthcare provider to show you how to use your inhaler correctly  Antibiotics  may be given for up to 5 days if your bronchitis is caused by bacteria  Acetaminophen  decreases pain and fever  It is available without a doctor's order  Ask how much to take and how often to take it  Follow directions   Read the labels of all other medicines you are using to see if they also contain acetaminophen, or ask your doctor or pharmacist  Acetaminophen can cause liver damage if not taken correctly  Do not use more than 4 grams (4,000 milligrams) total of acetaminophen in one day  NSAIDs  help decrease swelling and pain or fever  This medicine is available with or without a doctor's order  NSAIDs can cause stomach bleeding or kidney problems in certain people  If you take blood thinner medicine, always ask your healthcare provider if NSAIDs are safe for you  Always read the medicine label and follow directions  Take your medicine as directed  Contact your healthcare provider if you think your medicine is not helping or if you have side effects  Tell him of her if you are allergic to any medicine  Keep a list of the medicines, vitamins, and herbs you take  Include the amounts, and when and why you take them  Bring the list or the pill bottles to follow-up visits  Carry your medicine list with you in case of an emergency  Self-care:   Drink liquids as directed  You may need to drink more liquids than usual to stay hydrated  Ask how much liquid to drink each day and which liquids are best for you  Use a cool mist humidifier  to increase air moisture in your home  This may make it easier for you to breathe and help decrease your cough  Get more rest   Rest helps your body to heal  Slowly start to do more each day  Rest when you feel it is needed  Avoid irritants in the air  Avoid chemicals, fumes, and dust  Wear a face mask if you must work around dust or fumes  Stay inside on days when air pollution levels are high  If you have allergies, stay inside when pollen counts are high  Do not use aerosol products, such as spray-on deodorant, bug spray, and hair spray  Do not smoke or be around others who are smoking  Nicotine and other chemicals in cigarettes and cigars can cause lung damage  Ask your healthcare provider for information if you currently smoke and need help to quit   E-cigarettes or smokeless tobacco still contain nicotine  Talk to your healthcare provider before you use these products  Prevent acute bronchitis:       Ask about vaccines you may need  Get a flu vaccine each year as soon as recommended, usually in September or October  Ask your healthcare provider if you should also get a pneumonia or COVID-19 vaccine  Your healthcare provider can tell you if you should also get other vaccines, and when to get them  Prevent the spread of germs  You can decrease your risk for acute bronchitis and other illnesses by doing the following:    Wash your hands often with soap and water  Carry germ-killing hand lotion or gel with you  You can use the lotion or gel to clean your hands when soap and water are not available  Do not touch your eyes, nose, or mouth unless you have washed your hands first     Always cover your mouth when you cough to prevent the spread of germs  It is best to cough into a tissue or your shirt sleeve instead of into your hand  Ask those around you to cover their mouths when they cough  Try to avoid people who have a cold or the flu  If you are sick, stay away from others as much as possible  Follow up with your doctor as directed:  Write down questions you have so you will remember to ask them during your follow-up visits  © Copyright [address] 2022 Information is for End User's use only and may not be sold, redistributed or otherwise used for commercial purposes  All illustrations and images included in CareNotes® are the copyrighted property of [employer]  or David Calero  The above information is an  only  It is not intended as medical advice for individual conditions or treatments  Talk to your doctor, nurse or pharmacist before following any medical regimen to see if it is safe and effective for you  Sinusitis   AMBULATORY CARE:   Sinusitis  is inflammation or infection of your sinuses  Sinusitis is most often caused by a virus   Acute sinusitis may last up to 12 weeks  Chronic sinusitis lasts longer than 12 weeks  Recurrent sinusitis means you have 4 or more infections in 1 year  Common signs and symptoms:   Fever    Pain, pressure, redness, or swelling around the forehead, cheeks, or eyes    Thick yellow or green discharge from your nose    Tenderness when you touch your face over your sinuses    Dry cough that happens mostly at night or when you lie down    Headache and face pain that is worse when you lean forward    Tooth pain, or pain when you chew    Seek care immediately if:   You have trouble breathing or wheezing that is getting worse  You have a stiff neck, a fever, or a bad headache  You cannot open your eye  Your eyeball bulges out or you cannot move your eye  You are more sleepy than normal, or you notice changes in your ability to think, move, or talk  You have swelling of your forehead or scalp  Call your doctor if:   You have vision changes, such as double vision  Your eye and eyelid are red, swollen, and painful  Your symptoms do not improve or go away after 10 days  You have nausea and are vomiting  Your nose is bleeding  You have questions or concerns about your condition or care  Medicines: Your symptoms may go away on their own  Your healthcare provider may recommend watchful waiting for up to 10 days before starting antibiotics  You may need any of the following:  Acetaminophen  decreases pain and fever  It is available without a doctor's order  Ask how much to take and how often to take it  Follow directions  Read the labels of all other medicines you are using to see if they also contain acetaminophen, or ask your doctor or pharmacist  Acetaminophen can cause liver damage if not taken correctly  Do not use more than 4 grams (4,000 milligrams) total of acetaminophen in one day  NSAIDs , such as ibuprofen, help decrease swelling, pain, and fever   This medicine is available with or without a doctor's order  NSAIDs can cause stomach bleeding or kidney problems in certain people  If you take blood thinner medicine, always ask your healthcare provider if NSAIDs are safe for you  Always read the medicine label and follow directions  Nasal steroid sprays  may help decrease inflammation in your nose and sinuses  Decongestants  help reduce swelling and drain mucus in the nose and sinuses  They may help you breathe easier  Antihistamines  help dry mucus in the nose and relieve sneezing  Antibiotics  help treat or prevent a bacterial infection  Self-care:   Rinse your sinuses as directed  Use a sinus rinse device to rinse your nasal passages with a saline (salt water) solution or distilled water  Do not use tap water  This will help thin the mucus in your nose and rinse away pollen and dirt  It will also help reduce swelling so you can breathe normally  Use a humidifier  to increase air moisture in your home  This may make it easier for you to breathe and help decrease your cough  Sleep with your head elevated  Place an extra pillow under your head before you go to sleep to help your sinuses drain  Drink liquids as directed  Ask your healthcare provider how much liquid to drink each day and which liquids are best for you  Liquids will thin the mucus in your nose and help it drain  Avoid drinks that contain alcohol or caffeine  Do not smoke, and avoid secondhand smoke  Nicotine and other chemicals in cigarettes and cigars can make your symptoms worse  Ask your healthcare provider for information if you currently smoke and need help to quit  E-cigarettes or smokeless tobacco still contain nicotine  Talk to your healthcare provider before you use these products  Prevent the spread of germs:   Wash your hands often with soap and water  Wash your hands after you use the bathroom, change a child's diaper, or sneeze   Wash your hands before you prepare or eat food          Stay away from people who are sick  Some germs spread easily and quickly through contact  Follow up with your doctor as directed: You may be referred to an ear, nose, and throat specialist  Write down your questions so you remember to ask them during your visits  © Copyright [address] 2022 Information is for End User's use only and may not be sold, redistributed or otherwise used for commercial purposes  All illustrations and images included in CareNotes® are the copyrighted property of [employer]  or [facility]   The above information is an  only  It is not intended as medical advice for individual conditions or treatments  Talk to your doctor, nurse or pharmacist before following any medical regimen to see if it is safe and effective for you

## 2022-09-22 ENCOUNTER — VBI (OUTPATIENT)
Dept: ADMINISTRATIVE | Facility: OTHER | Age: 47
End: 2022-09-22

## 2022-11-28 NOTE — TELEPHONE ENCOUNTER
LMOM to CB, CB# provided  Pt may need sooner appt to discuss that MRI is denied  See below message  What Type Of Note Output Would You Prefer (Optional)?: Bullet Format Is This A New Presentation, Or A Follow-Up?: Skin Lesions

## 2023-01-03 ENCOUNTER — VBI (OUTPATIENT)
Dept: ADMINISTRATIVE | Facility: OTHER | Age: 48
End: 2023-01-03

## 2023-01-17 DIAGNOSIS — E11.9 TYPE 2 DIABETES MELLITUS WITHOUT COMPLICATION, WITHOUT LONG-TERM CURRENT USE OF INSULIN (HCC): ICD-10-CM

## 2023-02-07 NOTE — PATIENT INSTRUCTIONS

## 2023-02-13 ENCOUNTER — HOSPITAL ENCOUNTER (INPATIENT)
Facility: HOSPITAL | Age: 48
LOS: 1 days | Discharge: HOME | End: 2023-02-16
Attending: EMERGENCY MEDICINE | Admitting: INTERNAL MEDICINE

## 2023-02-13 ENCOUNTER — APPOINTMENT (EMERGENCY)
Dept: CT IMAGING | Facility: HOSPITAL | Age: 48
End: 2023-02-13

## 2023-02-13 DIAGNOSIS — K92.0 HEMATEMESIS WITHOUT NAUSEA: ICD-10-CM

## 2023-02-13 DIAGNOSIS — R10.9 ABDOMINAL PAIN: ICD-10-CM

## 2023-02-13 DIAGNOSIS — K92.2 LOWER GI BLEED: Primary | ICD-10-CM

## 2023-02-13 DIAGNOSIS — K92.0 HEMATEMESIS, UNSPECIFIED WHETHER NAUSEA PRESENT: ICD-10-CM

## 2023-02-13 DIAGNOSIS — K62.5 RECTAL BLEEDING: ICD-10-CM

## 2023-02-13 LAB
ALBUMIN SERPL BCP-MCNC: 4 G/DL (ref 3.5–5)
ALP SERPL-CCNC: 43 U/L (ref 34–104)
ALT SERPL W P-5'-P-CCNC: 34 U/L (ref 7–52)
ANION GAP SERPL CALCULATED.3IONS-SCNC: 9 MMOL/L (ref 4–13)
APTT PPP: 25 SECONDS (ref 23–37)
AST SERPL W P-5'-P-CCNC: 22 U/L (ref 13–39)
BASOPHILS # BLD AUTO: 0.03 THOUSANDS/ÂΜL (ref 0–0.1)
BASOPHILS NFR BLD AUTO: 0 % (ref 0–1)
BILIRUB SERPL-MCNC: 0.34 MG/DL (ref 0.2–1)
BUN SERPL-MCNC: 16 MG/DL (ref 5–25)
CALCIUM SERPL-MCNC: 9.6 MG/DL (ref 8.4–10.2)
CHLORIDE SERPL-SCNC: 97 MMOL/L (ref 96–108)
CO2 SERPL-SCNC: 27 MMOL/L (ref 21–32)
CREAT SERPL-MCNC: 0.76 MG/DL (ref 0.6–1.3)
EOSINOPHIL # BLD AUTO: 0.15 THOUSAND/ÂΜL (ref 0–0.61)
EOSINOPHIL NFR BLD AUTO: 2 % (ref 0–6)
ERYTHROCYTE [DISTWIDTH] IN BLOOD BY AUTOMATED COUNT: 13.3 % (ref 11.6–15.1)
FLUAV RNA RESP QL NAA+PROBE: NEGATIVE
FLUBV RNA RESP QL NAA+PROBE: NEGATIVE
GFR SERPL CREATININE-BSD FRML MDRD: 108 ML/MIN/1.73SQ M
GLUCOSE SERPL-MCNC: 252 MG/DL (ref 65–140)
HCT VFR BLD AUTO: 42.3 % (ref 36.5–49.3)
HGB BLD-MCNC: 14.7 G/DL (ref 12–17)
IMM GRANULOCYTES # BLD AUTO: 0.03 THOUSAND/UL (ref 0–0.2)
IMM GRANULOCYTES NFR BLD AUTO: 0 % (ref 0–2)
INR PPP: 1.04 (ref 0.84–1.19)
LACTATE SERPL-SCNC: 2.3 MMOL/L (ref 0.5–2)
LIPASE SERPL-CCNC: 17 U/L (ref 11–82)
LYMPHOCYTES # BLD AUTO: 2.33 THOUSANDS/ÂΜL (ref 0.6–4.47)
LYMPHOCYTES NFR BLD AUTO: 33 % (ref 14–44)
MCH RBC QN AUTO: 29.9 PG (ref 26.8–34.3)
MCHC RBC AUTO-ENTMCNC: 34.8 G/DL (ref 31.4–37.4)
MCV RBC AUTO: 86 FL (ref 82–98)
MONOCYTES # BLD AUTO: 0.46 THOUSAND/ÂΜL (ref 0.17–1.22)
MONOCYTES NFR BLD AUTO: 7 % (ref 4–12)
NEUTROPHILS # BLD AUTO: 4.02 THOUSANDS/ÂΜL (ref 1.85–7.62)
NEUTS SEG NFR BLD AUTO: 58 % (ref 43–75)
NRBC BLD AUTO-RTO: 0 /100 WBCS
PLATELET # BLD AUTO: 191 THOUSANDS/UL (ref 149–390)
PMV BLD AUTO: 8.8 FL (ref 8.9–12.7)
POTASSIUM SERPL-SCNC: 3.8 MMOL/L (ref 3.5–5.3)
PROT SERPL-MCNC: 7.3 G/DL (ref 6.4–8.4)
PROTHROMBIN TIME: 13.8 SECONDS (ref 11.6–14.5)
RBC # BLD AUTO: 4.91 MILLION/UL (ref 3.88–5.62)
RSV RNA RESP QL NAA+PROBE: NEGATIVE
SARS-COV-2 RNA RESP QL NAA+PROBE: NEGATIVE
SODIUM SERPL-SCNC: 133 MMOL/L (ref 135–147)
WBC # BLD AUTO: 7.02 THOUSAND/UL (ref 4.31–10.16)

## 2023-02-13 RX ORDER — DIPHENHYDRAMINE HYDROCHLORIDE 50 MG/ML
50 INJECTION INTRAMUSCULAR; INTRAVENOUS ONCE
Status: COMPLETED | OUTPATIENT
Start: 2023-02-13 | End: 2023-02-13

## 2023-02-13 RX ORDER — HYDROMORPHONE HCL/PF 1 MG/ML
0.5 SYRINGE (ML) INJECTION ONCE
Status: COMPLETED | OUTPATIENT
Start: 2023-02-13 | End: 2023-02-13

## 2023-02-13 RX ADMIN — SODIUM CHLORIDE 1000 ML: 0.9 INJECTION, SOLUTION INTRAVENOUS at 21:51

## 2023-02-13 RX ADMIN — DIPHENHYDRAMINE HYDROCHLORIDE 50 MG: 50 INJECTION, SOLUTION INTRAMUSCULAR; INTRAVENOUS at 21:51

## 2023-02-13 RX ADMIN — HYDROMORPHONE HYDROCHLORIDE 0.5 MG: 1 INJECTION, SOLUTION INTRAMUSCULAR; INTRAVENOUS; SUBCUTANEOUS at 22:12

## 2023-02-13 RX ADMIN — IOHEXOL 100 ML: 350 INJECTION, SOLUTION INTRAVENOUS at 22:55

## 2023-02-14 PROBLEM — R10.9 ABDOMINAL PAIN: Status: ACTIVE | Noted: 2023-02-14

## 2023-02-14 PROBLEM — K62.5 RECTAL BLEEDING: Status: ACTIVE | Noted: 2023-02-14

## 2023-02-14 LAB
ALBUMIN SERPL BCP-MCNC: 3.3 G/DL (ref 3.5–5)
ALP SERPL-CCNC: 31 U/L (ref 34–104)
ALT SERPL W P-5'-P-CCNC: 27 U/L (ref 7–52)
ANION GAP SERPL CALCULATED.3IONS-SCNC: 10 MMOL/L (ref 4–13)
AST SERPL W P-5'-P-CCNC: 18 U/L (ref 13–39)
BACTERIA UR QL AUTO: NORMAL /HPF
BASOPHILS # BLD AUTO: 0.03 THOUSANDS/ÂΜL (ref 0–0.1)
BASOPHILS NFR BLD AUTO: 0 % (ref 0–1)
BILIRUB SERPL-MCNC: 0.45 MG/DL (ref 0.2–1)
BILIRUB UR QL STRIP: NEGATIVE
BUN SERPL-MCNC: 13 MG/DL (ref 5–25)
CALCIUM ALBUM COR SERPL-MCNC: 8.9 MG/DL (ref 8.3–10.1)
CALCIUM SERPL-MCNC: 8.3 MG/DL (ref 8.4–10.2)
CHLORIDE SERPL-SCNC: 100 MMOL/L (ref 96–108)
CLARITY UR: CLEAR
CO2 SERPL-SCNC: 27 MMOL/L (ref 21–32)
COLOR UR: YELLOW
CREAT SERPL-MCNC: 0.73 MG/DL (ref 0.6–1.3)
EOSINOPHIL # BLD AUTO: 0.21 THOUSAND/ÂΜL (ref 0–0.61)
EOSINOPHIL NFR BLD AUTO: 3 % (ref 0–6)
ERYTHROCYTE [DISTWIDTH] IN BLOOD BY AUTOMATED COUNT: 13.4 % (ref 11.6–15.1)
GFR SERPL CREATININE-BSD FRML MDRD: 110 ML/MIN/1.73SQ M
GLUCOSE P FAST SERPL-MCNC: 119 MG/DL (ref 65–99)
GLUCOSE SERPL-MCNC: 106 MG/DL (ref 65–140)
GLUCOSE SERPL-MCNC: 110 MG/DL (ref 65–140)
GLUCOSE SERPL-MCNC: 119 MG/DL (ref 65–140)
GLUCOSE SERPL-MCNC: 140 MG/DL (ref 65–140)
GLUCOSE SERPL-MCNC: 166 MG/DL (ref 65–140)
GLUCOSE SERPL-MCNC: 175 MG/DL (ref 65–140)
GLUCOSE UR STRIP-MCNC: ABNORMAL MG/DL
HCT VFR BLD AUTO: 37.1 % (ref 36.5–49.3)
HCT VFR BLD AUTO: 38.2 % (ref 36.5–49.3)
HGB BLD-MCNC: 12.9 G/DL (ref 12–17)
HGB BLD-MCNC: 13.2 G/DL (ref 12–17)
HGB UR QL STRIP.AUTO: NEGATIVE
IMM GRANULOCYTES # BLD AUTO: 0.04 THOUSAND/UL (ref 0–0.2)
IMM GRANULOCYTES NFR BLD AUTO: 1 % (ref 0–2)
KETONES UR STRIP-MCNC: ABNORMAL MG/DL
LACTATE SERPL-SCNC: 1.8 MMOL/L (ref 0.5–2)
LEUKOCYTE ESTERASE UR QL STRIP: ABNORMAL
LYMPHOCYTES # BLD AUTO: 2.9 THOUSANDS/ÂΜL (ref 0.6–4.47)
LYMPHOCYTES NFR BLD AUTO: 41 % (ref 14–44)
MAGNESIUM SERPL-MCNC: 1.1 MG/DL (ref 1.9–2.7)
MCH RBC QN AUTO: 29.9 PG (ref 26.8–34.3)
MCHC RBC AUTO-ENTMCNC: 34.8 G/DL (ref 31.4–37.4)
MCV RBC AUTO: 86 FL (ref 82–98)
MONOCYTES # BLD AUTO: 0.48 THOUSAND/ÂΜL (ref 0.17–1.22)
MONOCYTES NFR BLD AUTO: 7 % (ref 4–12)
NEUTROPHILS # BLD AUTO: 3.37 THOUSANDS/ÂΜL (ref 1.85–7.62)
NEUTS SEG NFR BLD AUTO: 48 % (ref 43–75)
NITRITE UR QL STRIP: NEGATIVE
NON-SQ EPI CELLS URNS QL MICRO: NORMAL /HPF
NRBC BLD AUTO-RTO: 0 /100 WBCS
PH UR STRIP.AUTO: 6 [PH]
PHOSPHATE SERPL-MCNC: 4 MG/DL (ref 2.7–4.5)
PLATELET # BLD AUTO: 172 THOUSANDS/UL (ref 149–390)
PLATELET # BLD AUTO: 179 THOUSANDS/UL (ref 149–390)
PMV BLD AUTO: 9 FL (ref 8.9–12.7)
PMV BLD AUTO: 9.1 FL (ref 8.9–12.7)
POTASSIUM SERPL-SCNC: 3.8 MMOL/L (ref 3.5–5.3)
PROT SERPL-MCNC: 6.3 G/DL (ref 6.4–8.4)
PROT UR STRIP-MCNC: NEGATIVE MG/DL
RBC # BLD AUTO: 4.31 MILLION/UL (ref 3.88–5.62)
RBC #/AREA URNS AUTO: NORMAL /HPF
SODIUM SERPL-SCNC: 137 MMOL/L (ref 135–147)
SP GR UR STRIP.AUTO: 1.02 (ref 1–1.03)
UROBILINOGEN UR QL STRIP.AUTO: 0.2 E.U./DL
WBC # BLD AUTO: 7.03 THOUSAND/UL (ref 4.31–10.16)
WBC #/AREA URNS AUTO: NORMAL /HPF

## 2023-02-14 RX ORDER — DIVALPROEX SODIUM 250 MG/1
500 TABLET, DELAYED RELEASE ORAL 2 TIMES DAILY
Status: DISCONTINUED | OUTPATIENT
Start: 2023-02-14 | End: 2023-02-16 | Stop reason: HOSPADM

## 2023-02-14 RX ORDER — SODIUM CHLORIDE, SODIUM GLUCONATE, SODIUM ACETATE, POTASSIUM CHLORIDE, MAGNESIUM CHLORIDE, SODIUM PHOSPHATE, DIBASIC, AND POTASSIUM PHOSPHATE .53; .5; .37; .037; .03; .012; .00082 G/100ML; G/100ML; G/100ML; G/100ML; G/100ML; G/100ML; G/100ML
125 INJECTION, SOLUTION INTRAVENOUS CONTINUOUS
Status: DISCONTINUED | OUTPATIENT
Start: 2023-02-14 | End: 2023-02-16

## 2023-02-14 RX ORDER — INSULIN LISPRO 100 [IU]/ML
2-12 INJECTION, SOLUTION INTRAVENOUS; SUBCUTANEOUS
Status: DISCONTINUED | OUTPATIENT
Start: 2023-02-14 | End: 2023-02-14

## 2023-02-14 RX ORDER — LANOLIN ALCOHOL/MO/W.PET/CERES
400 CREAM (GRAM) TOPICAL DAILY
Status: DISCONTINUED | OUTPATIENT
Start: 2023-02-14 | End: 2023-02-16 | Stop reason: HOSPADM

## 2023-02-14 RX ORDER — FLUTICASONE PROPIONATE 110 UG/1
2 AEROSOL, METERED RESPIRATORY (INHALATION) EVERY 12 HOURS SCHEDULED
Status: DISCONTINUED | OUTPATIENT
Start: 2023-02-14 | End: 2023-02-16 | Stop reason: HOSPADM

## 2023-02-14 RX ORDER — LISINOPRIL 10 MG/1
10 TABLET ORAL DAILY
Status: DISCONTINUED | OUTPATIENT
Start: 2023-02-14 | End: 2023-02-16 | Stop reason: HOSPADM

## 2023-02-14 RX ORDER — RISPERIDONE 1 MG/1
2 TABLET ORAL DAILY
Status: DISCONTINUED | OUTPATIENT
Start: 2023-02-14 | End: 2023-02-16 | Stop reason: HOSPADM

## 2023-02-14 RX ORDER — ONDANSETRON 2 MG/ML
4 INJECTION INTRAMUSCULAR; INTRAVENOUS EVERY 6 HOURS PRN
Status: DISCONTINUED | OUTPATIENT
Start: 2023-02-14 | End: 2023-02-16 | Stop reason: HOSPADM

## 2023-02-14 RX ORDER — ASPIRIN 81 MG/1
81 TABLET, CHEWABLE ORAL DAILY
Status: DISCONTINUED | OUTPATIENT
Start: 2023-02-14 | End: 2023-02-16 | Stop reason: HOSPADM

## 2023-02-14 RX ORDER — HEPARIN SODIUM 5000 [USP'U]/ML
5000 INJECTION, SOLUTION INTRAVENOUS; SUBCUTANEOUS EVERY 8 HOURS SCHEDULED
Status: DISCONTINUED | OUTPATIENT
Start: 2023-02-14 | End: 2023-02-15

## 2023-02-14 RX ORDER — ATORVASTATIN CALCIUM 10 MG/1
10 TABLET, FILM COATED ORAL DAILY
Status: DISCONTINUED | OUTPATIENT
Start: 2023-02-14 | End: 2023-02-16 | Stop reason: HOSPADM

## 2023-02-14 RX ORDER — HYDROMORPHONE HCL/PF 1 MG/ML
0.5 SYRINGE (ML) INJECTION
Status: DISCONTINUED | OUTPATIENT
Start: 2023-02-14 | End: 2023-02-16 | Stop reason: HOSPADM

## 2023-02-14 RX ORDER — INSULIN LISPRO 100 [IU]/ML
2-12 INJECTION, SOLUTION INTRAVENOUS; SUBCUTANEOUS EVERY 6 HOURS
Status: DISCONTINUED | OUTPATIENT
Start: 2023-02-14 | End: 2023-02-15

## 2023-02-14 RX ORDER — ALBUTEROL SULFATE 90 UG/1
2 AEROSOL, METERED RESPIRATORY (INHALATION) EVERY 4 HOURS PRN
Status: DISCONTINUED | OUTPATIENT
Start: 2023-02-14 | End: 2023-02-16 | Stop reason: HOSPADM

## 2023-02-14 RX ADMIN — HEPARIN SODIUM 5000 UNITS: 5000 INJECTION INTRAVENOUS; SUBCUTANEOUS at 22:04

## 2023-02-14 RX ADMIN — INFLUENZA VIRUS VACCINE 0.5 ML: 15; 15; 15; 15 SUSPENSION INTRAMUSCULAR at 18:07

## 2023-02-14 RX ADMIN — LISINOPRIL 10 MG: 5 TABLET ORAL at 09:25

## 2023-02-14 RX ADMIN — POLYETHYLENE GLYCOL-3350 AND ELECTROLYTES 4000 ML: 236; 6.74; 5.86; 2.97; 22.74 POWDER, FOR SOLUTION ORAL at 16:42

## 2023-02-14 RX ADMIN — FLUTICASONE PROPIONATE 2 PUFF: 110 AEROSOL, METERED RESPIRATORY (INHALATION) at 09:27

## 2023-02-14 RX ADMIN — HEPARIN SODIUM 5000 UNITS: 5000 INJECTION INTRAVENOUS; SUBCUTANEOUS at 05:30

## 2023-02-14 RX ADMIN — MAGNESIUM OXIDE TAB 400 MG (241.3 MG ELEMENTAL MG) 400 MG: 400 (241.3 MG) TAB at 09:27

## 2023-02-14 RX ADMIN — SODIUM CHLORIDE, SODIUM GLUCONATE, SODIUM ACETATE, POTASSIUM CHLORIDE, MAGNESIUM CHLORIDE, SODIUM PHOSPHATE, DIBASIC, AND POTASSIUM PHOSPHATE 125 ML/HR: .53; .5; .37; .037; .03; .012; .00082 INJECTION, SOLUTION INTRAVENOUS at 02:17

## 2023-02-14 RX ADMIN — ASPIRIN 81 MG CHEWABLE TABLET 81 MG: 81 TABLET CHEWABLE at 09:26

## 2023-02-14 RX ADMIN — SODIUM CHLORIDE, SODIUM GLUCONATE, SODIUM ACETATE, POTASSIUM CHLORIDE, MAGNESIUM CHLORIDE, SODIUM PHOSPHATE, DIBASIC, AND POTASSIUM PHOSPHATE 125 ML/HR: .53; .5; .37; .037; .03; .012; .00082 INJECTION, SOLUTION INTRAVENOUS at 13:11

## 2023-02-14 RX ADMIN — DIVALPROEX SODIUM 500 MG: 250 TABLET, DELAYED RELEASE ORAL at 09:26

## 2023-02-14 RX ADMIN — HEPARIN SODIUM 5000 UNITS: 5000 INJECTION INTRAVENOUS; SUBCUTANEOUS at 14:35

## 2023-02-14 RX ADMIN — RISPERIDONE 2 MG: 1 TABLET ORAL at 09:26

## 2023-02-14 RX ADMIN — HYDROMORPHONE HYDROCHLORIDE 0.5 MG: 1 INJECTION, SOLUTION INTRAMUSCULAR; INTRAVENOUS; SUBCUTANEOUS at 04:22

## 2023-02-14 RX ADMIN — DIVALPROEX SODIUM 500 MG: 250 TABLET, DELAYED RELEASE ORAL at 17:18

## 2023-02-14 RX ADMIN — HYDROMORPHONE HYDROCHLORIDE 0.5 MG: 1 INJECTION, SOLUTION INTRAMUSCULAR; INTRAVENOUS; SUBCUTANEOUS at 09:31

## 2023-02-14 RX ADMIN — ATORVASTATIN CALCIUM 10 MG: 10 TABLET, FILM COATED ORAL at 09:26

## 2023-02-14 NOTE — ED NOTES
Patient requesting water  Discussed with Gifty vick   Per Gifty Palmer PA-C ok to allow Ice water and Water Cubes     Jluis Trent RN  02/14/23 2375

## 2023-02-14 NOTE — ASSESSMENT & PLAN NOTE
Lab Results   Component Value Date    HGBA1C 8 9 (H) 08/05/2021       No results for input(s): POCGLU in the last 72 hours      Blood Sugar Average: Last 72 hrs:  · Blood glucose 252 on presentation  · Hold home Jardiance, metformin  · Sliding scale insulin  · Every 6 fingerstick glucose checks

## 2023-02-14 NOTE — CONSULTS
Consultation - [facility]  Mohan Yao 52 y o  male MRN: [id number]  Unit/Bed#: XJ20 Encounter: [id number]        Inpatient consult to gastroenterology  Consult performed by: Lou Denton PA-C  Consult ordered by: Ana Pascal PA-C      Reason for Consult / Principal Problem:     1  Lower GI bleed  2  Abdominal pain     ASSESSMENT AND PLAN:      59-year-old male with dyslipidemia, DM, mild asthma, bipolar 1 disorder, and a history of PE presenting with abdominal pain and rectal bleeding  1  Abdominal pain  2  Rectal bleeding  The patient has chronic complaints of abdominal pain, rectal bleeding, diarrhea, and spitting up blood however he states over the past month these have acutely worsened  Prior to admit he was having bloody diarrhea with clots associated with generalized abdominal pain, dizziness, and spitting up blood  CT with no evidence of acute abdominopelvic process or bleed  HGB on admit 14 7 now 12 9  Last EGD in 2015 secondary to hematemesis revealed a Diana-Hussein tear  No prior colonoscopy  Does report first-degree relatives with both Crohn's disease and colon cancer   - trend H&H  - monitor for overt bleeding   - clears today  - NPO at midnight for EGD and colonoscopy tomorrow    ______________________________________________________________________    HPI: 59-year-old male with dyslipidemia, DM, mild asthma, bipolar 1 disorder, and a history of PE presenting with abdominal pain and rectal bleeding  The patient states that this has been ongoing for years but worsened over the past month  His abdominal pain is generalized and his rectal bleeding he describes as blood mixed into his stools  He states that he has had chronic diarrhea and continues to have loose bowel movements  He also states he often "spits up blood" after clearing his throat and has been feeling dizzy  On admit CT with no evidence of acute abdominopelvic process or bleed   HGB on admit 14 7 now 12 9  The patient reports a prior EGD and colonoscopy but per chart review this was only EGD done secondary to hematemesis in about 2015 and revealed tiffany jil tear  Back in 2016 he was seen in the office for the same complaints as of now and was recommended to undergo EGD and colonoscopy however this was not completed  When asking me about family history the patient is unable to give me clear history but it sounds as though he does have a family history of Crohn's and colon cancer in first-degree relatives  REVIEW OF SYSTEMS:    CONSTITUTIONAL: Denies any fever, chills, rigors, and weight loss  HEENT: No earache or tinnitus  Denies hearing loss or visual disturbances  CARDIOVASCULAR: No chest pain or palpitations  RESPIRATORY: Denies any cough, hemoptysis, shortness of breath or dyspnea on exertion  GASTROINTESTINAL: As noted in the History of Present Illness  GENITOURINARY: No problems with urination  Denies any hematuria or dysuria  NEUROLOGIC: No dizziness or vertigo, denies headaches  MUSCULOSKELETAL: Denies any muscle or joint pain  SKIN: Denies skin rashes or itching  ENDOCRINE: Denies excessive thirst  Denies intolerance to heat or cold  PSYCHOSOCIAL: Denies depression or anxiety  Denies any recent memory loss         Historical Information   Past Medical History:   Diagnosis Date   • Allergic rhinitis     resolved 6/20/17   • Asthma     resolved 6/20/17   • Bipolar 1 disorder ([address] )     resolved 6/20/17   • Chronic kidney disease    • Diabetes mellitus ([address] )    • Hematuria     resolved 6/20/17   • Hyperlipidemia    • Leukocytosis     resolved 6/20/17   • Tiffany-Hussein tear    • Pulmonary emboli ([address] )     resolved 6/20/17   • Seizures ([address] )    • Sleep apnea     resolved 6/20/17     Past Surgical History:   Procedure Laterality Date   • ANGIOPLASTY      previous stent placement   • CARDIAC SURGERY     • NASAL SEPTUM SURGERY       Social History   Social History     Substance and Sexual Activity   Alcohol Use Never   • Alcohol/week: 0 0 standard drinks    Comment: occasional as per [employer]     Social History     Substance and Sexual Activity   Drug Use No     Social History     Tobacco Use   Smoking Status Never   Smokeless Tobacco Former   • Types: Chew     Family History   Problem Relation Age of Onset   • Cancer Mother    • Diabetes Mother    • Hypertension Mother    • Asthma Father    • Cancer Paternal Grandfather    • Colon cancer Family        Meds/Allergies     (Not in a hospital admission)    Current Facility-Administered Medications   Medication Dose Route Frequency   • albuterol (PROVENTIL HFA,VENTOLIN HFA) inhaler 2 puff  2 puff Inhalation Q4H PRN   • aspirin chewable tablet 81 mg  81 mg Oral Daily   • atorvastatin (LIPITOR) tablet 10 mg  10 mg Oral Daily   • divalproex sodium (DEPAKOTE) EC tablet 500 mg  500 mg Oral BID   • fluticasone (FLOVENT HFA) 110 MCG/ACT inhaler 2 puff  2 puff Inhalation Q12H [address]   • heparin (porcine) subcutaneous injection 5,000 Units  5,000 Units Subcutaneous Q8H [address]   • HYDROmorphone (DILAUDID) injection 0 5 mg  0 5 mg Intravenous Q3H PRN   • insulin lispro (HumaLOG) 100 units/mL subcutaneous injection 2-12 Units  2-12 Units Subcutaneous Q6H   • lisinopril (ZESTRIL) tablet 10 mg  10 mg Oral Daily   • magnesium Oxide (MAG-OX) tablet 400 mg  400 mg Oral Daily   • multi-electrolyte (PLASMALYTE-A/ISOLYTE-S PH 7 4) IV solution  125 mL/hr Intravenous Continuous   • ondansetron (ZOFRAN) injection 4 mg  4 mg Intravenous Q6H PRN   • risperiDONE (RisperDAL) tablet 2 mg  2 mg Oral Daily       Allergies   Allergen Reactions   • Coconut Oil - Food Allergy Throat Swelling   • Epinephrine Other (See Comments)     "pass out"   • Iodine - Food Allergy Other (See Comments)     shellfish   • Other Other (See Comments)     NAUSEA VOMITING TO SHELLFISH   • Shellfish-Derived Products - Food Allergy GI Intolerance           Objective     Blood pressure 116/70, pulse 70, temperature 97 8 °F (36 6 °C), resp  rate 18, weight 120 kg (265 lb), SpO2 93 %  Body mass index is 40 29 kg/m²      No intake or output data in the 24 hours ending 02/14/23 0840      PHYSICAL EXAM:      General Appearance:   Alert, cooperative, no distress   HEENT:   Normocephalic, atraumatic, anicteric      Neck:  Supple, symmetrical, trachea midline   Lungs:   Respirations unlabored    Abdomen:   Soft, non-tender, non-distended; normal bowel sounds; no masses, no organomegaly    Genitalia:   Deferred    Rectal:   Deferred    Extremities:  No cyanosis, clubbing or edema    Skin:  No jaundice, rashes, or lesions      Lab Results:   Admission on 02/13/2023   Component Date Value   • WBC 02/13/2023 7 02    • RBC 02/13/2023 4 91    • Hemoglobin 02/13/2023 14 7    • Hematocrit 02/13/2023 42 3    • MCV 02/13/2023 86    • MCH 02/13/2023 29 9    • MCHC 02/13/2023 34 8    • RDW 02/13/2023 13 3    • MPV 02/13/2023 8 8 (L)    • Platelets 64/42/3853 191    • nRBC 02/13/2023 0    • Neutrophils Relative 02/13/2023 58    • Immat GRANS % 02/13/2023 0    • Lymphocytes Relative 02/13/2023 33    • Monocytes Relative 02/13/2023 7    • Eosinophils Relative 02/13/2023 2    • Basophils Relative 02/13/2023 0    • Neutrophils Absolute 02/13/2023 4 02    • Immature Grans Absolute 02/13/2023 0 03    • Lymphocytes Absolute 02/13/2023 2 33    • Monocytes Absolute 02/13/2023 0 46    • Eosinophils Absolute 02/13/2023 0 15    • Basophils Absolute 02/13/2023 0 03    • Protime 02/13/2023 13 8    • INR 02/13/2023 1 04    • PTT 02/13/2023 25    • Sodium 02/13/2023 133 (L)    • Potassium 02/13/2023 3 8    • Chloride 02/13/2023 97    • CO2 02/13/2023 27    • ANION GAP 02/13/2023 9    • BUN 02/13/2023 16    • Creatinine 02/13/2023 0 76    • Glucose 02/13/2023 252 (H)    • Calcium 02/13/2023 9 6    • AST 02/13/2023 22    • ALT 02/13/2023 34    • Alkaline Phosphatase 02/13/2023 43    • Total Protein 02/13/2023 7 3    • Albumin 02/13/2023 4 0    • Total Bilirubin 02/13/2023 0 34    • eGFR 02/13/2023 108    • Lipase 02/13/2023 17    • SARS-CoV-2 02/13/2023 Negative    • INFLUENZA A PCR 02/13/2023 Negative    • INFLUENZA B PCR 02/13/2023 Negative    • RSV PCR 02/13/2023 Negative    • LACTIC ACID 02/13/2023 2 3 (HH)    • Color, UA 02/14/2023 Yellow    • Clarity, UA 02/14/2023 Clear    • Specific Gravity, UA 02/14/2023 1 025    • pH, UA 02/14/2023 6 0    • Leukocytes, UA 02/14/2023 Elevated glucose may cause decreased leukocyte values   See urine microscopic for [facility] result/ (A)    • Nitrite, UA 02/14/2023 Negative    • Protein, UA 02/14/2023 Negative    • Glucose, UA 02/14/2023 >=1000 (1%) (A)    • Ketones, UA 02/14/2023 15 (1+) (A)    • Urobilinogen, UA 02/14/2023 0 2    • Bilirubin, UA 02/14/2023 Negative    • Occult Blood, UA 02/14/2023 Negative    • LACTIC ACID 02/14/2023 1 8    • RBC, UA 02/14/2023 None Seen    • WBC, UA 02/14/2023 0-1    • Epithelial Cells 02/14/2023 None Seen    • Bacteria, UA 02/14/2023 Occasional    • Platelets 57/72/3192 172    • MPV 02/14/2023 9 1    • POC Glucose 02/14/2023 106    • Sodium 02/14/2023 137    • Potassium 02/14/2023 3 8    • Chloride 02/14/2023 100    • CO2 02/14/2023 27    • ANION GAP 02/14/2023 10    • BUN 02/14/2023 13    • Creatinine 02/14/2023 0 73    • Glucose 02/14/2023 119    • Glucose, Fasting 02/14/2023 119 (H)    • Calcium 02/14/2023 8 3 (L)    • Corrected Calcium 02/14/2023 8 9    • AST 02/14/2023 18    • ALT 02/14/2023 27    • Alkaline Phosphatase 02/14/2023 31 (L)    • Total Protein 02/14/2023 6 3 (L)    • Albumin 02/14/2023 3 3 (L)    • Total Bilirubin 02/14/2023 0 45    • eGFR 02/14/2023 110    • Magnesium 02/14/2023 1 1 (L)    • Phosphorus 02/14/2023 4 0    • WBC 02/14/2023 7 03    • RBC 02/14/2023 4 31    • Hemoglobin 02/14/2023 12 9    • Hematocrit 02/14/2023 37 1    • MCV 02/14/2023 86    • MCH 02/14/2023 29 9    • MCHC 02/14/2023 34 8    • RDW 02/14/2023 13 4    • MPV 02/14/2023 9 0    • Platelets 02/14/2023 179    • nRBC 02/14/2023 0    • Neutrophils Relative 02/14/2023 48    • Immat GRANS % 02/14/2023 1    • Lymphocytes Relative 02/14/2023 41    • Monocytes Relative 02/14/2023 7    • Eosinophils Relative 02/14/2023 3    • Basophils Relative 02/14/2023 0    • Neutrophils Absolute 02/14/2023 3 37    • Immature Grans Absolute 02/14/2023 0 04    • Lymphocytes Absolute 02/14/2023 2 90    • Monocytes Absolute 02/14/2023 0 48    • Eosinophils Absolute 02/14/2023 0 21    • Basophils Absolute 02/14/2023 0 03    • POC Glucose 02/14/2023 110        Imaging Studies: I have personally reviewed pertinent imaging studies

## 2023-02-14 NOTE — PLAN OF CARE
Problem: PAIN - ADULT  Goal: Verbalizes/displays adequate comfort level or baseline comfort level  Description: Interventions:  - Encourage patient to monitor pain and request assistance  - Assess pain using appropriate pain scale  - Administer analgesics based on type and severity of pain and evaluate response  - Implement non-pharmacological measures as appropriate and evaluate response  - Consider cultural and social influences on pain and pain management  - Notify physician/advanced practitioner if interventions unsuccessful or patient reports new pain  Outcome: Progressing     Problem: INFECTION - ADULT  Goal: Absence or prevention of progression during hospitalization  Description: INTERVENTIONS:  - Assess and monitor for signs and symptoms of infection  - Monitor lab/diagnostic results  - Monitor all insertion sites, i e  indwelling lines, tubes, and drains  - Monitor endotracheal if appropriate and nasal secretions for changes in amount and color  - [place] appropriate cooling/warming therapies per order  - Administer medications as ordered  - Instruct and encourage patient and family to use good hand hygiene technique  - Identify and instruct in appropriate isolation precautions for identified infection/condition  Outcome: Progressing  Goal: Absence of fever/infection during neutropenic period  Description: INTERVENTIONS:  - Monitor WBC    Outcome: Progressing     Problem: DISCHARGE PLANNING  Goal: Discharge to home or other facility with appropriate resources  Description: INTERVENTIONS:  - Identify barriers to discharge w/patient and caregiver  - Arrange for needed discharge resources and transportation as appropriate  - Identify discharge learning needs (meds, wound care, etc )  - Arrange for interpretive services to assist at discharge as needed  - Refer to Case Management Department for coordinating discharge planning if the patient needs post-hospital services based on physician/advanced practitioner order or complex needs related to functional status, cognitive ability, or social support system  Outcome: Progressing     Problem: Knowledge Deficit  Goal: Patient/family/caregiver demonstrates understanding of disease process, treatment plan, medications, and discharge instructions  Description: Complete learning assessment and assess knowledge base    Interventions:  - Provide teaching at level of understanding  - Provide teaching via preferred learning methods  Outcome: Progressing     Problem: METABOLIC, FLUID AND ELECTROLYTES - ADULT  Goal: Electrolytes maintained within normal limits  Description: INTERVENTIONS:  - Monitor labs and assess patient for signs and symptoms of electrolyte imbalances  - Administer electrolyte replacement as ordered  - Monitor response to electrolyte replacements, including repeat lab results as appropriate  - Instruct patient on fluid and nutrition as appropriate  Outcome: Progressing  Goal: Fluid balance maintained  Description: INTERVENTIONS:  - Monitor labs   - Monitor I/O and WT  - Instruct patient on fluid and nutrition as appropriate  - Assess for signs & symptoms of volume excess or deficit  Outcome: Progressing     Problem: HEMATOLOGIC - ADULT  Goal: Maintains hematologic stability  Description: INTERVENTIONS  - Assess for signs and symptoms of bleeding or hemorrhage  - Monitor labs  - Administer supportive blood products/factors as ordered and appropriate  Outcome: Progressing

## 2023-02-14 NOTE — ASSESSMENT & PLAN NOTE
· Presents with rectal bleeding x1 month  As well, notes abdominal pain    · Hemoglobin stable  · CT abdomen pelvis showing no evidence of any acute abdominal processes  · GI consult  · N p o   · Monitor H&H

## 2023-02-14 NOTE — CASE MANAGEMENT
Case Management Discharge Planning Note    Patient name Jose D Mahoney  Location [id number] MRN [id number]  : 1975 Date 2023       Current Admission Date: 2023  Current Admission Diagnosis:Abdominal pain   Patient Active Problem List    Diagnosis Date Noted   • Abdominal pain 2023   • Rectal bleeding 2023   • Mixed dyslipidemia 09/10/2020   • Morbid obesity ([address] ) 09/10/2020   • Chronic pain of left knee 08/10/2020   • Lumbar disc disease with radiculopathy 2020   • Chronic left-sided low back pain with left-sided sciatica 2020   • Acute pain of left wrist 2019   • Obstructive sleep apnea syndrome 2019   • Hypomagnesemia 2019   • Atypical chest pain 2019   • Dizziness 2019   • History of pulmonary embolus (PE) 2018   • Bipolar 1 disorder ([facility] ) 2018   • Mild intermittent asthma without complication    • Hematemesis without nausea 2018   • Right calf pain 2018   • Type 2 diabetes mellitus without complication, without long-term current use of insulin ([facility] ) 2012      LOS (days): 0  Geometric Mean LOS (GMLOS) (days):   Days to GMLOS:     OBJECTIVE:            Current admission status: Observation   Preferred Pharmacy:   [address] [place] [id number]  Phone: [phone] Fax: [phone]    Primary Care Provider: Courtney Burks DO    Primary Insurance: Cleatus Muna  Secondary Insurance:     DISCHARGE DETAILS:    Discharge planning discussed with[de-identified] Pt  Freedom of Choice: Yes     CM contacted family/caregiver?: No- see comments (Pt is alert and oriented x3 )          I will continue to follow for any Case Management needs

## 2023-02-14 NOTE — ASSESSMENT & PLAN NOTE
· Patient presents with abdominal pain, rectal bleeding x1 month  States pain is intermittent as well as bleeding  · White count within normal limits  · Hemoglobin stable  · Please see assessment and plan for rectal bleeding  · CT abdomen pelvis within normal limits    No evidence of any acute intra-abdominal processes  · Case discussed with GI by ED provider who state they will see patient today as consult  · N p o   · IV analgesia  · Monitor H&H

## 2023-02-14 NOTE — H&P
[facility] [address] [place]  H&P- Talisha Mills 1975, 52 y o  male MRN: [id number]  Unit/Bed#: YE98 Encounter: [id number]  Primary Care Provider: Ishmael Luis DO   Date and time admitted to hospital: 2/13/2023  9:06 PM    * Abdominal pain  Assessment & Plan  · Patient presents with abdominal pain, rectal bleeding x1 month  States pain is intermittent as well as bleeding  · White count within normal limits  · Hemoglobin stable  · Please see assessment and plan for rectal bleeding  · CT abdomen pelvis within normal limits  No evidence of any acute intra-abdominal processes  · Case discussed with GI by ED provider who state they will see patient today as consult  · N p o   · IV analgesia  · Monitor H&H    Rectal bleeding  Assessment & Plan  · Presents with rectal bleeding x1 month  As well, notes abdominal pain  · Hemoglobin stable  · CT abdomen pelvis showing no evidence of any acute abdominal processes  · GI consult  · N p o   · Monitor H&H    Mixed dyslipidemia  Assessment & Plan  · Lipitor 40 mg oral daily    Type 2 diabetes mellitus without complication, without long-term current use of insulin (HCC)  Assessment & Plan  Lab Results   Component Value Date    HGBA1C 8 9 (H) 08/05/2021       No results for input(s): POCGLU in the last 72 hours  Blood Sugar Average: Last 72 hrs:  · Blood glucose 252 on presentation  · Hold home Jardiance, metformin  · Sliding scale insulin  · Every 6 fingerstick glucose checks    VTE Pharmacologic Prophylaxis: VTE Score: 4 Moderate Risk (Score 3-4) - Pharmacological DVT Prophylaxis Ordered: heparin  Code Status: Prior   Discussion with family: Patient declined call to   Anticipated Length of Stay: Patient will be admitted on an observation basis with an anticipated length of stay of less than 2 midnights secondary to Intractable abdominal pain, rectal bleeding      Total Time for Visit, including Counseling / Coordination of Care: 60 minutes Greater than 50% of this total time spent on direct patient counseling and coordination of care  Chief Complaint: Rectal bleed X1 month     History of Present Illness:  Mayra Thompson is a 52 y o  male with a PMH of CKD, hyperlipidemia, hypertension, type 2 diabetes who presents with abdominal pain, intermittent rectal bleeding x1 month  Patient states over the past month, he has had intermittent rectal bleeding as well as crampy abdominal pain  He states that he has always had issues with GI bleeding however over the past month it has been significantly worse  He states that several years ago he did have an EGD and colonoscopy but is not sure what the results are  He reports that the location of his pain is throughout the abdomen  As well, patient reports that he has had issues in the past with hemoptysis  He states that daily, he will cough up a small amount of blood  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   No radiation  Denies any nausea, vomiting, fevers, chills  No constipation, diarrhea  CT abdomen pelvis within normal limits  Case discussed with GI recommend admission with GI consult  Review of Systems:  Review of Systems   Constitutional: Negative for chills, fatigue and fever  HENT: Negative for ear pain and sore throat  Eyes: Negative for pain and visual disturbance  Respiratory: Negative for cough, shortness of breath and wheezing  Cardiovascular: Positive for leg swelling  Negative for chest pain and palpitations  Gastrointestinal: Positive for abdominal pain and blood in stool  Negative for constipation, diarrhea, nausea and vomiting  Genitourinary: Negative for dysuria and hematuria  Musculoskeletal: Negative for arthralgias and back pain  Skin: Negative for color change and rash  Neurological: Negative for dizziness, seizures, syncope, facial asymmetry, weakness, light-headedness and headaches     Psychiatric/Behavioral: Negative for agitation and confusion  All other systems reviewed and are negative  Past Medical and Surgical History:   Past Medical History:   Diagnosis Date   • Allergic rhinitis     resolved 6/20/17   • Asthma     resolved 6/20/17   • Bipolar 1 disorder ([facility] )     resolved 6/20/17   • Chronic kidney disease    • Diabetes mellitus ([facility] )    • Hematuria     resolved 6/20/17   • Hyperlipidemia    • Leukocytosis     resolved 6/20/17   • Diana-Hussein tear    • Pulmonary emboli ([facility] )     resolved 6/20/17   • Seizures ([facility] )    • Sleep apnea     resolved 6/20/17       Past Surgical History:   Procedure Laterality Date   • ANGIOPLASTY      previous stent placement   • CARDIAC SURGERY     • NASAL SEPTUM SURGERY         Meds/Allergies:  Prior to Admission medications    Medication Sig Start Date End Date Taking? Authorizing Provider   albuterol (ProAir HFA) 90 mcg/act inhaler Inhale 2 puffs every 4 (four) hours as needed for wheezing or shortness of breath 7/7/22   HE Joseph   aspirin 81 mg chewable tablet Chew 81 mg daily    Historical Provider, MD   atorvastatin (LIPITOR) 10 mg tablet Take 1 tablet (10 mg total) by mouth daily 12/20/21   Rosana Germain, DO   Blood Glucose Monitoring Suppl (ONE TOUCH ULTRA 2) w/Device KIT by Does not apply route 2 (two) times a day 8/12/20   Rosana Germain DO   budesonide (Pulmicort Flexhaler) 90 MCG/ACT inhaler Inhale 2 puffs 2 (two) times a day Rinse mouth after use  7/7/22   HE Joseph   divalproex sodium (DEPAKOTE) 500 mg EC tablet Take 500 mg by mouth 2 (two) times a day      Historical Provider, MD   Empagliflozin (Jardiance) 25 MG TABS Take 1 tablet (25 mg total) by mouth every morning 8/6/21   Rosana Germain, DO   glucose blood (OneTouch Ultra) test strip Test sugar BID 8/12/20   Rosana Germain, DO   Lancets (onetouch ultrasoft) lancets Test sugar BID 8/12/20   Rosana Germain, DO   lisinopril (ZESTRIL) 10 mg tablet Take 1 tablet (10 mg total) by mouth daily 4/18/22 Nataliia Spice, DO   magnesium oxide (MAG-OX) 400 mg Take 1 tablet (400 mg total) by mouth daily 8/6/21   Nataliia Spice, DO   metFORMIN (GLUCOPHAGE) 1000 MG tablet Take 1 tablet (1,000 mg total) by mouth 2 (two) times a day 1/17/23   Nataliia Spice, DO   risperiDONE (RisperDAL) 2 mg tablet Take 1 tablet (2 mg total) by mouth 2 (two) times a day  Patient taking differently: Take 2 mg by mouth daily 9/17/18   Nataliia Spice, DO     I have reviewed home medications with patient personally  Allergies: Allergies   Allergen Reactions   • Coconut Oil - Food Allergy Throat Swelling   • Epinephrine Other (See Comments)     "pass out"   • Iodine - Food Allergy Other (See Comments)     shellfish   • Other Other (See Comments)     NAUSEA VOMITING TO SHELLFISH   • Shellfish-Derived Products - Food Allergy GI Intolerance       Social History:  Marital Status: /Civil Union   Occupation:   Patient Pre-hospital Living Situation: Home  Patient Pre-hospital Level of Mobility: walks  Patient Pre-hospital Diet Restrictions: none   Substance Use History:   Social History     Substance and Sexual Activity   Alcohol Use Never   • Alcohol/week: 0 0 standard drinks    Comment: occasional as per [employer]     Social History     Tobacco Use   Smoking Status Never   Smokeless Tobacco Former   • Types: Chew     Social History     Substance and Sexual Activity   Drug Use No       Family History:  Family History   Problem Relation Age of Onset   • Cancer Mother    • Diabetes Mother    • Hypertension Mother    • Asthma Father    • Cancer Paternal Grandfather    • Colon cancer Family        Physical Exam:     Vitals:   Blood Pressure: 137/84 (02/13/23 2113)  Pulse: 91 (02/13/23 2110)  Temperature: 97 8 °F (36 6 °C) (02/13/23 2110)  Respirations: 19 (02/13/23 2110)  Weight - Scale: 120 kg (265 lb) (02/13/23 2110)  SpO2: 96 % (02/13/23 2110)    Physical Exam  Vitals and nursing note reviewed     Constitutional:       General: He is not in acute distress  Appearance: Normal appearance  He is well-developed  He is obese  He is not ill-appearing  HENT:      Head: Normocephalic and atraumatic  Mouth/Throat:      Mouth: Mucous membranes are moist       Pharynx: Oropharynx is clear  No oropharyngeal exudate or posterior oropharyngeal erythema  Eyes:      General: No scleral icterus  Right eye: No discharge  Left eye: No discharge  Extraocular Movements: Extraocular movements intact  Conjunctiva/sclera: Conjunctivae normal       Pupils: Pupils are equal, round, and reactive to light  Cardiovascular:      Rate and Rhythm: Normal rate and regular rhythm  Pulses: Normal pulses  Heart sounds: Normal heart sounds  No murmur heard  No friction rub  No gallop  Pulmonary:      Effort: Pulmonary effort is normal  No respiratory distress  Breath sounds: Normal breath sounds  No wheezing, rhonchi or rales  Abdominal:      General: Abdomen is flat  Bowel sounds are normal  There is no distension  Palpations: Abdomen is soft  Tenderness: There is generalized abdominal tenderness  There is no guarding or rebound  Musculoskeletal:         General: No swelling  Cervical back: Neck supple  Right lower leg: No edema  Left lower leg: No edema  Skin:     General: Skin is warm and dry  Capillary Refill: Capillary refill takes less than 2 seconds  Neurological:      General: No focal deficit present  Mental Status: He is alert and oriented to person, place, and time  Mental status is at baseline  Cranial Nerves: No cranial nerve deficit  Motor: No weakness        Coordination: Coordination normal    Psychiatric:         Mood and Affect: Mood normal          Behavior: Behavior normal           Additional Data:     Lab Results:  Results from last 7 days   Lab Units 02/13/23  2128   WBC Thousand/uL 7 02   HEMOGLOBIN g/dL 14 7   HEMATOCRIT % 42 3   PLATELETS Thousands/uL 191   NEUTROS PCT % 58   LYMPHS PCT % 33   MONOS PCT % 7   EOS PCT % 2     Results from last 7 days   Lab Units 02/13/23 2128   SODIUM mmol/L 133*   POTASSIUM mmol/L 3 8   CHLORIDE mmol/L 97   CO2 mmol/L 27   BUN mg/dL 16   CREATININE mg/dL 0 76   ANION GAP mmol/L 9   CALCIUM mg/dL 9 6   ALBUMIN g/dL 4 0   TOTAL BILIRUBIN mg/dL 0 34   ALK PHOS U/L 43   ALT U/L 34   AST U/L 22   GLUCOSE RANDOM mg/dL 252*     Results from last 7 days   Lab Units 02/13/23 2128   INR  1 04             Results from last 7 days   Lab Units 02/13/23 2128   LACTIC ACID mmol/L 2 3*       Lines/Drains:  Invasive Devices     Peripheral Intravenous Line  Duration           Peripheral IV 02/13/23 Distal;Right;Upper;Ventral (anterior) Arm <1 day                    Imaging: Reviewed radiology reports from this admission including: abdominal/pelvic CT imaging reviewed by V rad  Found to have no acute abdominal processes V rad report  CT abdomen pelvis with contrast    (Results Pending)       EKG and Other Studies Reviewed on Admission:   · EKG: NSR  HR 77     ** Please Note: This note has been constructed using a voice recognition system   **

## 2023-02-14 NOTE — ED PROVIDER NOTES
Final Diagnosis:  1  Lower GI bleed    2  Abdominal pain    3  Rectal bleeding    4  Hematemesis, unspecified whether nausea present        Chief Complaint   Patient presents with   • Abdominal Pain     Abdominal Pain and bilateral flank pain radiating to the front  X1 month   • Rectal Bleeding     States having bloody stools x1 month  Did not follow up with any PCP     HPI  Patient presents for evaluation of abdominal pain and bloody stools  He is accompanied by his wife who he said made him come in today secondary to rectal bleeding  Patient states that this has intermittently happened to him  He tells me that he was at another campus couple years ago and that he had an EGD and colonoscopy done but he does not know what the results were as he did not follow-up with them  He states that he also feels that he is having generalized abdominal pain as well as back pain  Wife states that it seems that the pain was worse today which causes him to come in for further evaluation  Denies any nausea, vomiting, fever or chills  No constipation or diarrhea  Wife shows me a picture of some blood in the toilet  There also concerned about him potentially passing clots  Review of records show that the patient's been to the emergency department multiple times, usually for chest pain  Records were cortisol fish allergy  He tells me that at one point he did have a reaction to CT dye  Looking back in the records it appears that he has received at least 5 times, most currently 2 years ago for CTA without any pretreatment or reaction  Unless otherwise specified:  - No language barrier    - History obtained from patient  - There are no limitations to the history obtained    - Previous charting was reviewed    PMH:   has a past medical history of Allergic rhinitis, Asthma, Bipolar 1 disorder ([facility] [address] ), Chronic kidney disease, Diabetes mellitus ([facility] [address] ), Hematuria, Hyperlipidemia, Leukocytosis, Diana-Hussein tear, Pulmonary emboli ([facility] [address] ), Seizures ([facility] [address] ), and Sleep apnea  PSH:   has a past surgical history that includes Nasal septum surgery; Angioplasty; and Cardiac surgery  ROS:  Review of Systems   - 13 point ROS was performed and all are normal unless stated in the history above  - Nursing note reviewed  Vitals reviewed  - Orders placed by myself and/or advanced practitioner / resident  PE:   Vitals:    02/14/23 0400 02/14/23 0730 02/14/23 1315 02/14/23 1335   BP: 119/71 116/70 142/79    BP Location:  Left arm     Pulse: 78 70 72    Resp:  18 18    Temp:   (!) 97 3 °F (36 3 °C)    SpO2: 94% 93% 96%    Weight:    126 kg (277 lb 12 8 oz)   Height:    5' 8" (1 727 m)     Vitals reviewed by me  Rectal exam was performed, good rectal tone, scant blood in stool  No obvious hemorrhoids  Guaiac positive  Unless otherwise specified above:    General: VS reviewed  Appears in NAD    Head: Normocephalic, atraumatic  Eyes: EOM-I      ENT: Atraumatic external nose and ears  No drooling  Neck: No JVD  CV: No pallor noted  Lungs:   No tachypnea  No respiratory distress    Abdomen:  Soft, non-tender, non-distended    MSK:   No obvious deformity    Skin: Dry, intact  No obvious rash  Psychiatric/Behavioral: Appropriate mood and affect   Exam: deferred    Physical Exam     Procedures   A:  - Nursing note reviewed  CT abdomen pelvis with contrast   Final Result      No evidence of acute abdominopelvic process allowing for routine abdomen and pelvis CT protocol  [facility] on the preliminary report attributed to rectal branch of the IMV rather than contrast extravasation  No gross    colorectal intraluminal hemorrhage  Colonic diverticulosis without diverticulitis  Hepatomegaly and hepatic steatosis  Splenomegaly        The major findings are in agreement with the preliminary report provided by [employer] which was provided shortly after completion of the exam  The finding of tiny rectal branch of the IMV rather than rectal bleed will now be communicated with    patient's clinical team by our radiology liaison  The study was marked in [facility] for immediate notification  Workstation performed: [id number]           Orders Placed This Encounter   Procedures   • FLU/RSV/COVID - if FLU/RSV clinically relevant   • CT abdomen pelvis with contrast   • CBC and differential   • Protime-INR   • APTT   • Comprehensive metabolic panel   • Lipase   • Lactic acid   • UA w Reflex to Microscopic w Reflex to Culture   • Lactic acid 2 Hours   • Urine Microscopic   • Comprehensive metabolic panel   • Magnesium   • Phosphorus   • CBC and differential   • Platelet count   • Hemoglobin and hematocrit, blood   • Diet Clear Liquid   • Diet NPO; Sips with meds   • Insert peripheral IV   • Nursing communication Continue IV as ordered     • Notify admitting physician   • Notify admitting physician on arrival   • Vital Signs per unit routine   • Notify physician   • Up and OOB as tolerated   • Apply SCD or Foot pumps   • Insulin Subcutaneous Notify Physician   • Insulin Subcutaneous Instruction   • Hypoglycemia Protocol   • Fingerstick Glucose (POCT) Every 6 hours (Primarily for patients who are NPO)   • Fingerstick Glucose (POCT)   • Level 1-Full Code: all life saving measures are indicated   • Inpatient consult to gastroenterology   • Place in Observation   • Place in Observation   • EGD   • Colonoscopy     Labs Reviewed   CBC AND DIFFERENTIAL - Abnormal       Result Value Ref Range Status    WBC 7 02  4 31 - 10 16 Thousand/uL Final    RBC 4 91  3 88 - 5 62 Million/uL Final    Hemoglobin 14 7  12 0 - 17 0 g/dL Final    Hematocrit 42 3  36 5 - 49 3 % Final    MCV 86  82 - 98 fL Final    MCH 29 9  26 8 - 34 3 pg Final    MCHC 34 8  31 4 - 37 4 g/dL Final    RDW 13 3  11 6 - 15 1 % Final    MPV 8 8 (*) 8 9 - 12 7 fL Final    Platelets 564  443 - 390 Thousands/uL Final nRBC 0  /100 WBCs Final    Neutrophils Relative 58  43 - 75 % Final    Immat GRANS % 0  0 - 2 % Final    Lymphocytes Relative 33  14 - 44 % Final    Monocytes Relative 7  4 - 12 % Final    Eosinophils Relative 2  0 - 6 % Final    Basophils Relative 0  0 - 1 % Final    Neutrophils Absolute 4 02  1 85 - 7 62 Thousands/µL Final    Immature Grans Absolute 0 03  0 00 - 0 20 Thousand/uL Final    Lymphocytes Absolute 2 33  0 60 - 4 47 Thousands/µL Final    Monocytes Absolute 0 46  0 17 - 1 22 Thousand/µL Final    Eosinophils Absolute 0 15  0 00 - 0 61 Thousand/µL Final    Basophils Absolute 0 03  0 00 - 0 10 Thousands/µL Final   COMPREHENSIVE METABOLIC PANEL - Abnormal    Sodium 133 (*) 135 - 147 mmol/L Final    Potassium 3 8  3 5 - 5 3 mmol/L Final    Chloride 97  96 - 108 mmol/L Final    CO2 27  21 - 32 mmol/L Final    ANION GAP 9  4 - 13 mmol/L Final    BUN 16  5 - 25 mg/dL Final    Creatinine 0 76  0 60 - 1 30 mg/dL Final    Comment: Standardized to IDMS reference method    Glucose 252 (*) 65 - 140 mg/dL Final    Comment: If the patient is fasting, the ADA then defines impaired fasting glucose as > 100 mg/dL and diabetes as > or equal to 123 mg/dL  Specimen collection should occur prior to Sulfasalazine administration due to the potential for falsely depressed results  Specimen collection should occur prior to Sulfapyridine administration due to the potential for falsely elevated results  Calcium 9 6  8 4 - 10 2 mg/dL Final    AST 22  13 - 39 U/L Final    Comment: Specimen collection should occur prior to Sulfasalazine administration due to the potential for falsely depressed results  ALT 34  7 - 52 U/L Final    Comment: Specimen collection should occur prior to Sulfasalazine administration due to the potential for falsely depressed results       Alkaline Phosphatase 43  34 - 104 U/L Final    Total Protein 7 3  6 4 - 8 4 g/dL Final    Albumin 4 0  3 5 - 5 0 g/dL Final    Total Bilirubin 0 34  0 20 - 1 00 mg/dL Final    eGFR 108  ml/min/1 73sq m Final    Narrative:     [place] guidelines for Chronic Kidney Disease (CKD):   •  Stage 1 with normal or high GFR (GFR > 90 mL/min/1 73 square meters)  •  Stage 2 Mild CKD (GFR = 60-89 mL/min/1 73 square meters)  •  Stage 3A Moderate CKD (GFR = 45-59 mL/min/1 73 square meters)  •  Stage 3B Moderate CKD (GFR = 30-44 mL/min/1 73 square meters)  •  Stage 4 Severe CKD (GFR = 15-29 mL/min/1 73 square meters)  •  Stage 5 End Stage CKD (GFR <15 mL/min/1 73 square meters)  Note: GFR calculation is accurate only with a steady state creatinine   LACTIC ACID, PLASMA - Abnormal    LACTIC ACID 2 3 (*) 0 5 - 2 0 mmol/L Final    Narrative:     Result may be elevated if tourniquet was used during collection  UA W REFLEX TO MICROSCOPIC WITH REFLEX TO CULTURE - Abnormal    Color, UA Yellow   Final    Clarity, UA Clear   Final    Specific Gravity, UA 1 025  1 003 - 1 030 Final    pH, UA 6 0  4 5, 5 0, 5 5, 6 0, 6 5, 7 0, 7 5, 8 0 Final    Leukocytes, UA   (*) Negative Final    Value: Elevated glucose may cause decreased leukocyte values   See urine microscopic for [facility] result/    Nitrite, UA Negative  Negative Final    Protein, UA Negative  Negative mg/dl Final    Glucose, UA >=1000 (1%) (*) Negative mg/dl Final    Ketones, UA 15 (1+) (*) Negative mg/dl Final    Urobilinogen, UA 0 2  0 2, 1 0 E U /dl E U /dl Final    Bilirubin, UA Negative  Negative Final    Occult Blood, UA Negative  Negative Final   COMPREHENSIVE METABOLIC PANEL - Abnormal    Sodium 137  135 - 147 mmol/L Final    Potassium 3 8  3 5 - 5 3 mmol/L Final    Chloride 100  96 - 108 mmol/L Final    CO2 27  21 - 32 mmol/L Final    ANION GAP 10  4 - 13 mmol/L Final    BUN 13  5 - 25 mg/dL Final    Creatinine 0 73  0 60 - 1 30 mg/dL Final    Comment: Standardized to IDMS reference method    Glucose 119  65 - 140 mg/dL Final    Comment: If the patient is fasting, the ADA then defines impaired fasting glucose as > 100 mg/dL and diabetes as > or equal to 123 mg/dL  Specimen collection should occur prior to Sulfasalazine administration due to the potential for falsely depressed results  Specimen collection should occur prior to Sulfapyridine administration due to the potential for falsely elevated results  Glucose, Fasting 119 (*) 65 - 99 mg/dL Final    Comment: Specimen collection should occur prior to Sulfasalazine administration due to the potential for falsely depressed results  Specimen collection should occur prior to Sulfapyridine administration due to the potential for falsely elevated results  Calcium 8 3 (*) 8 4 - 10 2 mg/dL Final    Corrected Calcium 8 9  8 3 - 10 1 mg/dL Final    AST 18  13 - 39 U/L Final    Comment: Specimen collection should occur prior to Sulfasalazine administration due to the potential for falsely depressed results  ALT 27  7 - 52 U/L Final    Comment: Specimen collection should occur prior to Sulfasalazine administration due to the potential for falsely depressed results       Alkaline Phosphatase 31 (*) 34 - 104 U/L Final    Total Protein 6 3 (*) 6 4 - 8 4 g/dL Final    Albumin 3 3 (*) 3 5 - 5 0 g/dL Final    Total Bilirubin 0 45  0 20 - 1 00 mg/dL Final    eGFR 110  ml/min/1 73sq m Final    Narrative:     [place] guidelines for Chronic Kidney Disease (CKD):   •  Stage 1 with normal or high GFR (GFR > 90 mL/min/1 73 square meters)  •  Stage 2 Mild CKD (GFR = 60-89 mL/min/1 73 square meters)  •  Stage 3A Moderate CKD (GFR = 45-59 mL/min/1 73 square meters)  •  Stage 3B Moderate CKD (GFR = 30-44 mL/min/1 73 square meters)  •  Stage 4 Severe CKD (GFR = 15-29 mL/min/1 73 square meters)  •  Stage 5 End Stage CKD (GFR <15 mL/min/1 73 square meters)  Note: GFR calculation is accurate only with a steady state creatinine   MAGNESIUM - Abnormal    Magnesium 1 1 (*) 1 9 - 2 7 mg/dL Final   COVID19, INFLUENZA A/B, RSV PCR, [facility] - Normal    SARS-CoV-2 Negative Negative Final    INFLUENZA A PCR Negative  Negative Final    INFLUENZA B PCR Negative  Negative Final    RSV PCR Negative  Negative Final    Narrative:     FOR PEDIATRIC PATIENTS - copy/paste COVID Guidelines URL to browser: [email or web address]    SARS-CoV-2 assay is a Nucleic Acid Amplification assay intended for the  qualitative detection of nucleic acid from SARS-CoV-2 in nasopharyngeal  swabs  Results are for the presumptive identification of SARS-CoV-2 RNA  Positive results are indicative of infection with SARS-CoV-2, the virus  causing COVID-19, but do not rule out bacterial infection or co-infection  with other viruses  Laboratories within the [place] and its  territories are required to report all positive results to the appropriate  public health authorities  Negative results do not preclude SARS-CoV-2  infection and should not be used as the sole basis for treatment or other  patient management decisions  Negative results must be combined with  clinical observations, patient history, and epidemiological information  This test has not been FDA cleared or approved  This test has been authorized by FDA under an Emergency Use Authorization  (EUA)  This test is only authorized for the duration of time the  declaration that circumstances exist justifying the authorization of the  emergency use of an in vitro diagnostic tests for detection of SARS-CoV-2  virus and/or diagnosis of COVID-19 infection under section 564(b)(1) of  the Act, 21 U  S C  036PQJ-9(S)(5), unless the authorization is terminated  or revoked sooner  The test has been validated but independent review by FDA  and CLIA is pending  Test performed using [employer] Gene[employer]pert: This RT-PCR assay targets N2,  a region unique to SARS-CoV-2  A conserved region in the E-gene was chosen  for pan-Sarbecovirus detection which includes SARS-CoV-2      According to CMS-2020-01-R, this platform meets the definition of high[employer] technology  PROTIME-INR - Normal    Protime 13 8  11 6 - 14 5 seconds Final    INR 1 04  0 84 - 1 19 Final   APTT - Normal    PTT 25  23 - 37 seconds Final    Comment: Therapeutic Heparin Range =  60-90 seconds   LIPASE - Normal    Lipase 17  11 - 82 u/L Final   LACTIC ACID 2 HOUR - Normal    LACTIC ACID 1 8  0 5 - 2 0 mmol/L Final    Narrative:     Result may be elevated if tourniquet was used during collection     URINE MICROSCOPIC - Normal    RBC, UA None Seen  None Seen, 0-1, 1-2, 2-4, 0-5 /hpf Final    WBC, UA 0-1  None Seen, 0-1, 1-2, 0-5, 2-4 /hpf Final    Epithelial Cells None Seen  None Seen, Occasional /hpf Final    Bacteria, UA Occasional  None Seen, Occasional /hpf Final   PLATELET COUNT - Normal    Platelets 832  581 - 390 Thousands/uL Final    MPV 9 1  8 9 - 12 7 fL Final   PHOSPHORUS - Normal    Phosphorus 4 0  2 7 - 4 5 mg/dL Final   POCT GLUCOSE - Normal    POC Glucose 106  65 - 140 mg/dl Final   POCT GLUCOSE - Normal    POC Glucose 110  65 - 140 mg/dl Final   CBC AND DIFFERENTIAL    WBC 7 03  4 31 - 10 16 Thousand/uL Final    RBC 4 31  3 88 - 5 62 Million/uL Final    Hemoglobin 12 9  12 0 - 17 0 g/dL Final    Hematocrit 37 1  36 5 - 49 3 % Final    MCV 86  82 - 98 fL Final    MCH 29 9  26 8 - 34 3 pg Final    MCHC 34 8  31 4 - 37 4 g/dL Final    RDW 13 4  11 6 - 15 1 % Final    MPV 9 0  8 9 - 12 7 fL Final    Platelets 249  665 - 390 Thousands/uL Final    nRBC 0  /100 WBCs Final    Neutrophils Relative 48  43 - 75 % Final    Immat GRANS % 1  0 - 2 % Final    Lymphocytes Relative 41  14 - 44 % Final    Monocytes Relative 7  4 - 12 % Final    Eosinophils Relative 3  0 - 6 % Final    Basophils Relative 0  0 - 1 % Final    Neutrophils Absolute 3 37  1 85 - 7 62 Thousands/µL Final    Immature Grans Absolute 0 04  0 00 - 0 20 Thousand/uL Final    Lymphocytes Absolute 2 90  0 60 - 4 47 Thousands/µL Final    Monocytes Absolute 0 48  0 17 - 1 22 Thousand/µL Final Eosinophils Absolute 0 21  0 00 - 0 61 Thousand/µL Final    Basophils Absolute 0 03  0 00 - 0 10 Thousands/µL Final         Final Diagnosis:  1  Lower GI bleed    2  Abdominal pain    3  Rectal bleeding    4  Hematemesis, unspecified whether nausea present        P:  -Patient presents for evaluation of bloody stools and generalized abdominal pain  Will evaluate for cholecystitis, nephrolithiasis, pyelonephritis, small bowel obstruction, pancreatitis  Patient is not on thinners   -Laboratory analysis showed stable hemoglobin  No evidence of cholecystitis, nephrolithiasis, pyelo-, SBO or pancreatitis  CT scan performed and was normal   Patient did not have any reaction after receiving CT dye  I discussed with patient that he could be admitted to hospital or follow-up with GI  Secondary to ongoing rectal pain he felt that she come in for evaluation  I then reviewed the case with gastroenterology  Given that he is not on thinners with stable vital signs and hemoglobin they stated that he would be evaluated before determining if colonoscopy was appropriate  Patient was admitted to the medical team       Unless otherwise noted the patient's medications were reviewed and they should continue as directed      Medications   albuterol (PROVENTIL HFA,VENTOLIN HFA) inhaler 2 puff (has no administration in time range)   aspirin chewable tablet 81 mg (81 mg Oral Given 2/14/23 0926)   atorvastatin (LIPITOR) tablet 10 mg (10 mg Oral Given 2/14/23 0926)   divalproex sodium (DEPAKOTE) EC tablet 500 mg (500 mg Oral Given 2/14/23 0926)   fluticasone (FLOVENT HFA) 110 MCG/ACT inhaler 2 puff (2 puffs Inhalation Given 2/14/23 0927)   lisinopril (ZESTRIL) tablet 10 mg (10 mg Oral Given 2/14/23 0925)   magnesium Oxide (MAG-OX) tablet 400 mg (400 mg Oral Given 2/14/23 0927)   risperiDONE (RisperDAL) tablet 2 mg (2 mg Oral Given 2/14/23 0926)   multi-electrolyte (PLASMALYTE-A/ISOLYTE-S PH 7 4) IV solution (125 mL/hr Intravenous New Bag 2/14/23 1311)   ondansetron (ZOFRAN) injection 4 mg (has no administration in time range)   heparin (porcine) subcutaneous injection 5,000 Units (5,000 Units Subcutaneous Given 2/14/23 1435)   HYDROmorphone (DILAUDID) injection 0 5 mg (0 5 mg Intravenous Given 2/14/23 0931)   insulin lispro (HumaLOG) 100 units/mL subcutaneous injection 2-12 Units (2 Units Subcutaneous Not Given 2/14/23 1432)   polyethylene glycol (GOLYTELY) bowel prep 4,000 mL (has no administration in time range)   influenza vaccine, quadrivalent (FLUARIX) IM injection 0 5 mL (has no administration in time range)   sodium chloride 0 9 % bolus 1,000 mL (0 mL Intravenous Stopped 2/13/23 2303)   diphenhydrAMINE (BENADRYL) injection 50 mg (50 mg Intravenous Given 2/13/23 2151)   HYDROmorphone (DILAUDID) injection 0 5 mg (0 5 mg Intravenous Given 2/13/23 2212)   iohexol (OMNIPAQUE) 350 MG/ML injection (SINGLE-DOSE) 100 mL (100 mL Intravenous Given 2/13/23 2255)     Time reflects when diagnosis was documented in both MDM as applicable and the Disposition within this note     Time User Action Codes Description Comment    2/14/2023  1:02 AM Gloria Vaughan Add [K92 2] Lower GI bleed     2/14/2023  1:02 AM Gloria Vaughan Add [R10 9] Abdominal pain     2/14/2023  9:02 AM Sushila Christine Add [K62 5] Rectal bleeding     2/14/2023  9:02 AM Sushila King Add [K92 0] Hematemesis, unspecified whether nausea present       ED Disposition     ED Disposition   Admit    Condition   Stable    Date/Time   Tue Feb 14, 2023  1:02 AM    Comment   Case was discussed with DOLLY and the patient's admission status was agreed to be Admission Status: observation status to the service of Dr Natividad Agosto              Follow-up Information    None       Current Discharge Medication List      CONTINUE these medications which have NOT CHANGED    Details   albuterol (ProAir HFA) 90 mcg/act inhaler Inhale 2 puffs every 4 (four) hours as needed for wheezing or shortness of breath  Qty: 8 5 g, Refills: 2    Comments: Substitution to a formulary equivalent within the same pharmaceutical class is authorized  Associated Diagnoses: Cough; Acute bronchitis, unspecified organism      aspirin 81 mg chewable tablet Chew 81 mg daily      divalproex sodium (DEPAKOTE) 500 mg EC tablet Take 500 mg by mouth 2 (two) times a day  lisinopril (ZESTRIL) 10 mg tablet Take 1 tablet (10 mg total) by mouth daily  Qty: 30 tablet, Refills: 5    Associated Diagnoses: Type 2 diabetes mellitus without complication, without long-term current use of insulin ([facility])      magnesium oxide (MAG-OX) 400 mg Take 1 tablet (400 mg total) by mouth daily  Qty: 30 tablet, Refills: 5    Associated Diagnoses: Hypomagnesemia      metFORMIN (GLUCOPHAGE) 1000 MG tablet Take 1 tablet (1,000 mg total) by mouth 2 (two) times a day  Qty: 60 tablet, Refills: 0    Associated Diagnoses: Type 2 diabetes mellitus without complication, without long-term current use of insulin ([facility])      risperiDONE (RisperDAL) 2 mg tablet Take 1 tablet (2 mg total) by mouth 2 (two) times a day  Qty: 30 tablet, Refills: 0    Associated Diagnoses: Bipolar 1 disorder ([facility])      atorvastatin (LIPITOR) 10 mg tablet Take 1 tablet (10 mg total) by mouth daily  Qty: 30 tablet, Refills: 5    Associated Diagnoses: Type 2 diabetes mellitus without complication, without long-term current use of insulin ([facility])      Blood Glucose Monitoring Suppl (ONE TOUCH ULTRA 2) w/Device KIT by Does not apply route 2 (two) times a day  Qty: 1 each, Refills: 0    Associated Diagnoses: Type 2 diabetes mellitus without complication, without long-term current use of insulin ([facility])      budesonide (Pulmicort Flexhaler) 90 MCG/ACT inhaler Inhale 2 puffs 2 (two) times a day Rinse mouth after use  Qty: 1 each, Refills: 2    Associated Diagnoses: Cough;  Acute bronchitis, unspecified organism      Empagliflozin (Jardiance) 25 MG TABS Take 1 tablet (25 mg total) by mouth every morning  Qty: 30 tablet, Refills: 5    Associated Diagnoses: Type 2 diabetes mellitus without complication, without long-term current use of insulin ([facility])      glucose blood (OneTouch Ultra) test strip Test sugar BID  Qty: 100 each, Refills: 5    Associated Diagnoses: Type 2 diabetes mellitus without complication, without long-term current use of insulin ([facility])      Lancets (onetouch ultrasoft) lancets Test sugar BID  Qty: 100 each, Refills: 5    Associated Diagnoses: Type 2 diabetes mellitus without complication, without long-term current use of insulin ([facility] )           No discharge procedures on file  Prior to Admission Medications   Prescriptions Last Dose Informant Patient Reported? Taking? Blood Glucose Monitoring Suppl (ONE TOUCH ULTRA 2) w/Device KIT   No No   Sig: by Does not apply route 2 (two) times a day   Empagliflozin (Jardiance) 25 MG TABS Not Taking  No No   Sig: Take 1 tablet (25 mg total) by mouth every morning   Patient not taking: Reported on 2/14/2023   Lancets (onetouch ultrasoft) lancets   No No   Sig: Test sugar BID   albuterol (ProAir HFA) 90 mcg/act inhaler 2/13/2023  No Yes   Sig: Inhale 2 puffs every 4 (four) hours as needed for wheezing or shortness of breath   aspirin 81 mg chewable tablet 2/13/2023  Yes Yes   Sig: Chew 81 mg daily   atorvastatin (LIPITOR) 10 mg tablet Not Taking  No No   Sig: Take 1 tablet (10 mg total) by mouth daily   Patient not taking: Reported on 2/14/2023   budesonide (Pulmicort Flexhaler) 90 MCG/ACT inhaler Not Taking  No No   Sig: Inhale 2 puffs 2 (two) times a day Rinse mouth after use  Patient not taking: Reported on 2/14/2023   divalproex sodium (DEPAKOTE) 500 mg EC tablet 2/13/2023  Yes Yes   Sig: Take 500 mg by mouth 2 (two) times a day     glucose blood (OneTouch Ultra) test strip   No No   Sig: Test sugar BID   lisinopril (ZESTRIL) 10 mg tablet 2/13/2023  No Yes   Sig: Take 1 tablet (10 mg total) by mouth daily   magnesium oxide (MAG-OX) 400 mg 2/13/2023  No Yes   Sig: Take 1 tablet (400 mg total) by mouth daily   metFORMIN (GLUCOPHAGE) 1000 MG tablet 2/13/2023  No Yes   Sig: Take 1 tablet (1,000 mg total) by mouth 2 (two) times a day   risperiDONE (RisperDAL) 2 mg tablet 2/13/2023  No Yes   Sig: Take 1 tablet (2 mg total) by mouth 2 (two) times a day   Patient taking differently: Take 2 mg by mouth daily      Facility-Administered Medications: None       Portions of the record may have been created with voice recognition software  Occasional wrong word or "sound a like" substitutions may have occurred due to the inherent limitations of voice recognition software  Read the chart carefully and recognize, using context, where substitutions have occurred      Electronically signed by:  MD Edilson Escobar MD  02/14/23 5302

## 2023-02-15 ENCOUNTER — ANESTHESIA (OUTPATIENT)
Dept: PERIOP | Facility: HOSPITAL | Age: 48
End: 2023-02-15

## 2023-02-15 ENCOUNTER — ANESTHESIA EVENT (OUTPATIENT)
Dept: PERIOP | Facility: HOSPITAL | Age: 48
End: 2023-02-15

## 2023-02-15 ENCOUNTER — APPOINTMENT (OUTPATIENT)
Dept: PERIOP | Facility: HOSPITAL | Age: 48
End: 2023-02-15

## 2023-02-15 ENCOUNTER — APPOINTMENT (INPATIENT)
Dept: RADIOLOGY | Facility: HOSPITAL | Age: 48
End: 2023-02-15

## 2023-02-15 LAB
ALBUMIN SERPL BCP-MCNC: 3.9 G/DL (ref 3.5–5)
ALP SERPL-CCNC: 37 U/L (ref 34–104)
ALT SERPL W P-5'-P-CCNC: 35 U/L (ref 7–52)
ANION GAP SERPL CALCULATED.3IONS-SCNC: 12 MMOL/L (ref 4–13)
AST SERPL W P-5'-P-CCNC: 23 U/L (ref 13–39)
BASOPHILS # BLD AUTO: 0.01 THOUSANDS/ÂΜL (ref 0–0.1)
BASOPHILS NFR BLD AUTO: 0 % (ref 0–1)
BILIRUB SERPL-MCNC: 0.87 MG/DL (ref 0.2–1)
BUN SERPL-MCNC: 10 MG/DL (ref 5–25)
CALCIUM SERPL-MCNC: 9.1 MG/DL (ref 8.4–10.2)
CHLORIDE SERPL-SCNC: 96 MMOL/L (ref 96–108)
CO2 SERPL-SCNC: 25 MMOL/L (ref 21–32)
CREAT SERPL-MCNC: 0.89 MG/DL (ref 0.6–1.3)
EOSINOPHIL # BLD AUTO: 0.04 THOUSAND/ÂΜL (ref 0–0.61)
EOSINOPHIL NFR BLD AUTO: 1 % (ref 0–6)
ERYTHROCYTE [DISTWIDTH] IN BLOOD BY AUTOMATED COUNT: 13.2 % (ref 11.6–15.1)
GFR SERPL CREATININE-BSD FRML MDRD: 101 ML/MIN/1.73SQ M
GLUCOSE SERPL-MCNC: 111 MG/DL (ref 65–140)
GLUCOSE SERPL-MCNC: 127 MG/DL (ref 65–140)
GLUCOSE SERPL-MCNC: 138 MG/DL (ref 65–140)
GLUCOSE SERPL-MCNC: 138 MG/DL (ref 65–140)
GLUCOSE SERPL-MCNC: 143 MG/DL (ref 65–140)
GLUCOSE SERPL-MCNC: 190 MG/DL (ref 65–140)
HCT VFR BLD AUTO: 38.9 % (ref 36.5–49.3)
HCT VFR BLD AUTO: 39.1 % (ref 36.5–49.3)
HCT VFR BLD AUTO: 40.2 % (ref 36.5–49.3)
HGB BLD-MCNC: 13.7 G/DL (ref 12–17)
HGB BLD-MCNC: 13.7 G/DL (ref 12–17)
HGB BLD-MCNC: 14 G/DL (ref 12–17)
IMM GRANULOCYTES # BLD AUTO: 0.04 THOUSAND/UL (ref 0–0.2)
IMM GRANULOCYTES NFR BLD AUTO: 1 % (ref 0–2)
LACTATE SERPL-SCNC: 2 MMOL/L (ref 0.5–2)
LYMPHOCYTES # BLD AUTO: 1.02 THOUSANDS/ÂΜL (ref 0.6–4.47)
LYMPHOCYTES NFR BLD AUTO: 14 % (ref 14–44)
MCH RBC QN AUTO: 29.9 PG (ref 26.8–34.3)
MCHC RBC AUTO-ENTMCNC: 34.8 G/DL (ref 31.4–37.4)
MCV RBC AUTO: 86 FL (ref 82–98)
MONOCYTES # BLD AUTO: 0.44 THOUSAND/ÂΜL (ref 0.17–1.22)
MONOCYTES NFR BLD AUTO: 6 % (ref 4–12)
NEUTROPHILS # BLD AUTO: 5.56 THOUSANDS/ÂΜL (ref 1.85–7.62)
NEUTS SEG NFR BLD AUTO: 78 % (ref 43–75)
NRBC BLD AUTO-RTO: 0 /100 WBCS
PLATELET # BLD AUTO: 152 THOUSANDS/UL (ref 149–390)
PMV BLD AUTO: 9.1 FL (ref 8.9–12.7)
POTASSIUM SERPL-SCNC: 3.5 MMOL/L (ref 3.5–5.3)
PROT SERPL-MCNC: 7 G/DL (ref 6.4–8.4)
RBC # BLD AUTO: 4.68 MILLION/UL (ref 3.88–5.62)
SODIUM SERPL-SCNC: 133 MMOL/L (ref 135–147)
WBC # BLD AUTO: 7.11 THOUSAND/UL (ref 4.31–10.16)

## 2023-02-15 PROCEDURE — 0DB78ZX EXCISION OF STOMACH, PYLORUS, VIA NATURAL OR ARTIFICIAL OPENING ENDOSCOPIC, DIAGNOSTIC: ICD-10-PCS | Performed by: INTERNAL MEDICINE

## 2023-02-15 PROCEDURE — 0DBP8ZZ EXCISION OF RECTUM, VIA NATURAL OR ARTIFICIAL OPENING ENDOSCOPIC: ICD-10-PCS | Performed by: INTERNAL MEDICINE

## 2023-02-15 PROCEDURE — 0DB98ZX EXCISION OF DUODENUM, VIA NATURAL OR ARTIFICIAL OPENING ENDOSCOPIC, DIAGNOSTIC: ICD-10-PCS | Performed by: INTERNAL MEDICINE

## 2023-02-15 PROCEDURE — 0DBM8ZX EXCISION OF DESCENDING COLON, VIA NATURAL OR ARTIFICIAL OPENING ENDOSCOPIC, DIAGNOSTIC: ICD-10-PCS | Performed by: INTERNAL MEDICINE

## 2023-02-15 RX ORDER — PROPOFOL 10 MG/ML
INJECTION, EMULSION INTRAVENOUS CONTINUOUS PRN
Status: DISCONTINUED | OUTPATIENT
Start: 2023-02-15 | End: 2023-02-15

## 2023-02-15 RX ORDER — INSULIN LISPRO 100 [IU]/ML
2-12 INJECTION, SOLUTION INTRAVENOUS; SUBCUTANEOUS
Status: DISCONTINUED | OUTPATIENT
Start: 2023-02-16 | End: 2023-02-16 | Stop reason: HOSPADM

## 2023-02-15 RX ORDER — PROPOFOL 10 MG/ML
INJECTION, EMULSION INTRAVENOUS AS NEEDED
Status: DISCONTINUED | OUTPATIENT
Start: 2023-02-15 | End: 2023-02-15

## 2023-02-15 RX ORDER — SODIUM CHLORIDE, SODIUM LACTATE, POTASSIUM CHLORIDE, CALCIUM CHLORIDE 600; 310; 30; 20 MG/100ML; MG/100ML; MG/100ML; MG/100ML
20 INJECTION, SOLUTION INTRAVENOUS CONTINUOUS
Status: DISCONTINUED | OUTPATIENT
Start: 2023-02-15 | End: 2023-02-16

## 2023-02-15 RX ORDER — SODIUM CHLORIDE, SODIUM LACTATE, POTASSIUM CHLORIDE, CALCIUM CHLORIDE 600; 310; 30; 20 MG/100ML; MG/100ML; MG/100ML; MG/100ML
INJECTION, SOLUTION INTRAVENOUS CONTINUOUS PRN
Status: DISCONTINUED | OUTPATIENT
Start: 2023-02-15 | End: 2023-02-15

## 2023-02-15 RX ORDER — ACETAMINOPHEN 325 MG/1
650 TABLET ORAL EVERY 6 HOURS PRN
Status: DISCONTINUED | OUTPATIENT
Start: 2023-02-15 | End: 2023-02-16 | Stop reason: HOSPADM

## 2023-02-15 RX ADMIN — PROPOFOL 100 MG: 10 INJECTION, EMULSION INTRAVENOUS at 13:12

## 2023-02-15 RX ADMIN — SODIUM CHLORIDE, SODIUM GLUCONATE, SODIUM ACETATE, POTASSIUM CHLORIDE, MAGNESIUM CHLORIDE, SODIUM PHOSPHATE, DIBASIC, AND POTASSIUM PHOSPHATE 125 ML/HR: .53; .5; .37; .037; .03; .012; .00082 INJECTION, SOLUTION INTRAVENOUS at 18:01

## 2023-02-15 RX ADMIN — HEPARIN SODIUM 5000 UNITS: 5000 INJECTION INTRAVENOUS; SUBCUTANEOUS at 05:45

## 2023-02-15 RX ADMIN — PIPERACILLIN AND TAZOBACTAM 3.38 G: 3; .375 INJECTION, POWDER, FOR SOLUTION INTRAVENOUS at 23:50

## 2023-02-15 RX ADMIN — PROPOFOL 100 MG: 10 INJECTION, EMULSION INTRAVENOUS at 13:10

## 2023-02-15 RX ADMIN — PROPOFOL 130 MCG/KG/MIN: 10 INJECTION, EMULSION INTRAVENOUS at 13:17

## 2023-02-15 RX ADMIN — ACETAMINOPHEN 650 MG: 325 TABLET, FILM COATED ORAL at 22:51

## 2023-02-15 RX ADMIN — SODIUM CHLORIDE, SODIUM LACTATE, POTASSIUM CHLORIDE, AND CALCIUM CHLORIDE 20 ML/HR: .6; .31; .03; .02 INJECTION, SOLUTION INTRAVENOUS at 13:45

## 2023-02-15 RX ADMIN — DIVALPROEX SODIUM 500 MG: 250 TABLET, DELAYED RELEASE ORAL at 08:32

## 2023-02-15 RX ADMIN — RISPERIDONE 2 MG: 1 TABLET ORAL at 08:32

## 2023-02-15 RX ADMIN — MAGNESIUM OXIDE TAB 400 MG (241.3 MG ELEMENTAL MG) 400 MG: 400 (241.3 MG) TAB at 08:32

## 2023-02-15 RX ADMIN — LIDOCAINE HYDROCHLORIDE 100 MG: 20 INJECTION, SOLUTION INTRAVENOUS at 13:10

## 2023-02-15 RX ADMIN — SODIUM CHLORIDE, SODIUM LACTATE, POTASSIUM CHLORIDE, AND CALCIUM CHLORIDE: .6; .31; .03; .02 INJECTION, SOLUTION INTRAVENOUS at 13:01

## 2023-02-15 RX ADMIN — DIVALPROEX SODIUM 500 MG: 250 TABLET, DELAYED RELEASE ORAL at 17:02

## 2023-02-15 RX ADMIN — LISINOPRIL 10 MG: 5 TABLET ORAL at 08:32

## 2023-02-15 NOTE — PROGRESS NOTES
[address] [facility] [address]  Progress Note - Jeff Manifold 1975, 52 y o  male MRN: [id number]  Unit/Bed#: 703-37 Encounter: [id number]  Primary Care Provider: Chitra De Paz DO   Date and time admitted to hospital: 2/13/2023  9:06 PM    Rectal bleeding  Assessment & Plan  · Presents with rectal bleeding x1 month  As well, notes abdominal pain  · Hemoglobin stable  · CT abdomen pelvis showing no evidence of any acute abdominal processes  · Planning for EGD and colonoscopy today  · Follow-up results and GI Recs    Type 2 diabetes mellitus without complication, without long-term current use of insulin [facility])  Assessment & Plan  Lab Results   Component Value Date    HGBA1C 8 9 (H) 08/05/2021       Recent Labs     02/14/23  1731 02/14/23  2049 02/15/23  0201 02/15/23  0603   POCGLU 175* 140 111 143*       Blood Sugar Average: Last 72 hrs:  · (P) 135 [id number] glucose 252 on presentation  · Hold home Jardiance, metformin  · Sliding scale insulin    * Abdominal pain  Assessment & Plan  · Patient presents with abdominal pain, rectal bleeding x1 month  States pain is intermittent as well as bleeding  · White count within normal limits  · Hemoglobin stable  · CT abdomen pelvis within normal limits  No evidence of any acute intra-abdominal processes  · Abdominal pain improved  · See plan as outlined below      VTE Pharmacologic Prophylaxis:   Pharmacologic: Heparin  Mechanical VTE Prophylaxis in Place: Yes    Patient Centered Rounds: I have performed bedside rounds with nursing staff today  Discussions with Specialists or Other Care Team Provider: cm, nursing    Education and Discussions with Family / Patient: pt    Time Spent for Care: 30 minutes  More than 50% of total time spent on counseling and coordination of care as described above      Current Length of Stay: 0 day(s)    Current Patient Status: Observation   Certification Statement: The patient will continue to require additional inpatient hospital stay due to See below    Discharge Plan: Pending EGD and colonoscopy  Dissipate discharge within 24 hours    Code Status: Level 1 - Full Code      Subjective:   Denies fevers, chills, cough, chest pain    Objective:     Vitals:   Temp (24hrs), Av 3 °F (36 3 °C), Min:97 2 °F (36 2 °C), Max:97 3 °F (36 3 °C)    Temp:  [97 2 °F (36 2 °C)-97 3 °F (36 3 °C)] 97 2 °F (36 2 °C)  HR:  [72-85] 85  Resp:  [18-19] 19  BP: (120-142)/(75-79) 120/75  SpO2:  [96 %-97 %] 97 %  Body mass index is 42 24 kg/m²  Input and Output Summary (last 24 hours): Intake/Output Summary (Last 24 hours) at 2/15/2023 1058  Last data filed at 2/15/2023 0742  Gross per 24 hour   Intake 0 ml   Output --   Net 0 ml       Physical Exam:     Physical Exam  Constitutional:       General: He is not in acute distress  Appearance: He is well-developed  He is not diaphoretic  HENT:      Head: Normocephalic and atraumatic  Nose: Nose normal       Mouth/Throat:      Pharynx: No oropharyngeal exudate  Eyes:      General: No scleral icterus  Conjunctiva/sclera: Conjunctivae normal    Cardiovascular:      Rate and Rhythm: Normal rate and regular rhythm  Heart sounds: Normal heart sounds  No murmur heard  No friction rub  No gallop  Pulmonary:      Effort: Pulmonary effort is normal  No respiratory distress  Breath sounds: Normal breath sounds  No wheezing or rales  Chest:      Chest wall: No tenderness  Abdominal:      General: Bowel sounds are normal  There is no distension  Palpations: Abdomen is soft  Tenderness: There is no abdominal tenderness  There is no guarding  Musculoskeletal:         General: No tenderness or deformity  Normal range of motion  Cervical back: Normal range of motion and neck supple  Skin:     General: Skin is warm and dry  Findings: No erythema  Neurological:      Mental Status: He is alert  Mental status is at baseline         (   Additional Data:     Labs:    Results from last 7 days   Lab Units 02/15/23  0940 02/14/23  2023 02/14/23  0535   WBC Thousand/uL  --   --  7 03   HEMOGLOBIN g/dL 13 7   < > 12 9   HEMATOCRIT % 39 1   < > 37 1   PLATELETS Thousands/uL  --   --  179   NEUTROS PCT %  --   --  48   LYMPHS PCT %  --   --  41   MONOS PCT %  --   --  7   EOS PCT %  --   --  3    < > = values in this interval not displayed  Results from last 7 days   Lab Units 02/14/23  0535   SODIUM mmol/L 137   POTASSIUM mmol/L 3 8   CHLORIDE mmol/L 100   CO2 mmol/L 27   BUN mg/dL 13   CREATININE mg/dL 0 73   ANION GAP mmol/L 10   CALCIUM mg/dL 8 3*   ALBUMIN g/dL 3 3*   TOTAL BILIRUBIN mg/dL 0 45   ALK PHOS U/L 31*   ALT U/L 27   AST U/L 18   GLUCOSE RANDOM mg/dL 119     Results from last 7 days   Lab Units 02/13/23  2128   INR  1 04     Results from last 7 days   Lab Units 02/15/23  0603 02/15/23  0201 02/14/23  2049 02/14/23  1731 02/14/23  1439 02/14/23  0732 02/14/23  0213   POC GLUCOSE mg/dl 143* 111 140 175* 166* 110 106         Results from last 7 days   Lab Units 02/14/23  0221 02/13/23  2128   LACTIC ACID mmol/L 1 8 2 3*           * I Have Reviewed All Lab Data Listed Above  * Additional Pertinent Lab Tests Reviewed:  All Labs Within Last 24 Hours Reviewed    Imaging:    Imaging Reports Reviewed Today Include: na  Imaging Personally Reviewed by Myself Includes:  na    Recent Cultures (last 7 days):           Last 24 Hours Medication List:   Current Facility-Administered Medications   Medication Dose Route Frequency Provider Last Rate   • albuterol  2 puff Inhalation Q4H PRN Ashlyn Celestin PA-C     • aspirin  81 mg Oral Daily Ashlyn Celestin PA-C     • atorvastatin  10 mg Oral Daily Ashlyn Celestin PA-C     • divalproex sodium  500 mg Oral BID Ashlyn Celestin PA-C     • fluticasone  2 puff Inhalation Q12H [address] Robin Lehman PA-C     • heparin (porcine)  5,000 Units Subcutaneous [facility] Ashlyn Celestin PA-C     • HYDROmorphone  0 5 mg Intravenous Q3H PRN Albert Moore PA-C     • insulin lispro  2-12 Units Subcutaneous Q6H Robin Lehman PA-C     • lisinopril  10 mg Oral Daily Albert Moore PA-C     • magnesium Oxide  400 mg Oral Daily Albert Moore PA-C     • multi-electrolyte  125 mL/hr Intravenous Continuous Albert Moore PA-C 125 mL/hr (02/14/23 1311)   • ondansetron  4 mg Intravenous Q6H PRN Albert Moore PA-C     • risperiDONE  2 mg Oral Daily Albert Moore PA-C          Today, Patient Was Seen By: Anshu Doherty MD    ** Please Note: Dictation voice to text software may have been used in the creation of this document   **

## 2023-02-15 NOTE — ASSESSMENT & PLAN NOTE
· Presents with rectal bleeding x1 month  As well, notes abdominal pain    · Hemoglobin stable  · CT abdomen pelvis showing no evidence of any acute abdominal processes  · Planning for EGD and colonoscopy today  · Follow-up results and GI Recs

## 2023-02-15 NOTE — QUICK NOTE
Patient completed most of his bowel prep  States his last bowel movement was clear in color  No blood  HGB stable  Will proceed with EGD and colonoscopy today as planned

## 2023-02-15 NOTE — ASSESSMENT & PLAN NOTE
Lab Results   Component Value Date    HGBA1C 8 9 (H) 08/05/2021       Recent Labs     02/14/23  1731 02/14/23  2049 02/15/23  0201 02/15/23  0603   POCGLU 175* 140 111 143*       Blood Sugar Average: Last 72 hrs:  · (P) 135 [id number] glucose 252 on presentation  · Hold home Jardiance, metformin  · Sliding scale insulin

## 2023-02-15 NOTE — PLAN OF CARE
Problem: PAIN - ADULT  Goal: Verbalizes/displays adequate comfort level or baseline comfort level  Description: Interventions:  - Encourage patient to monitor pain and request assistance  - Assess pain using appropriate pain scale  - Administer analgesics based on type and severity of pain and evaluate response  - Implement non-pharmacological measures as appropriate and evaluate response  - Consider cultural and social influences on pain and pain management  - Notify physician/advanced practitioner if interventions unsuccessful or patient reports new pain  Outcome: Progressing     Problem: INFECTION - ADULT  Goal: Absence or prevention of progression during hospitalization  Description: INTERVENTIONS:  - Assess and monitor for signs and symptoms of infection  - Monitor lab/diagnostic results  - Monitor all insertion sites, i e  indwelling lines, tubes, and drains  - Monitor endotracheal if appropriate and nasal secretions for changes in amount and color  - [place] appropriate cooling/warming therapies per order  - Administer medications as ordered  - Instruct and encourage patient and family to use good hand hygiene technique  - Identify and instruct in appropriate isolation precautions for identified infection/condition  Outcome: Progressing  Goal: Absence of fever/infection during neutropenic period  Description: INTERVENTIONS:  - Monitor WBC    Outcome: Progressing     Problem: DISCHARGE PLANNING  Goal: Discharge to home or other facility with appropriate resources  Description: INTERVENTIONS:  - Identify barriers to discharge w/patient and caregiver  - Arrange for needed discharge resources and transportation as appropriate  - Identify discharge learning needs (meds, wound care, etc )  - Arrange for interpretive services to assist at discharge as needed  - Refer to Case Management Department for coordinating discharge planning if the patient needs post-hospital services based on physician/advanced practitioner order or complex needs related to functional status, cognitive ability, or social support system  Outcome: Progressing     Problem: Knowledge Deficit  Goal: Patient/family/caregiver demonstrates understanding of disease process, treatment plan, medications, and discharge instructions  Description: Complete learning assessment and assess knowledge base    Interventions:  - Provide teaching at level of understanding  - Provide teaching via preferred learning methods  Outcome: Progressing     Problem: METABOLIC, FLUID AND ELECTROLYTES - ADULT  Goal: Electrolytes maintained within normal limits  Description: INTERVENTIONS:  - Monitor labs and assess patient for signs and symptoms of electrolyte imbalances  - Administer electrolyte replacement as ordered  - Monitor response to electrolyte replacements, including repeat lab results as appropriate  - Instruct patient on fluid and nutrition as appropriate  Outcome: Progressing  Goal: Fluid balance maintained  Description: INTERVENTIONS:  - Monitor labs   - Monitor I/O and WT  - Instruct patient on fluid and nutrition as appropriate  - Assess for signs & symptoms of volume excess or deficit  Outcome: Progressing     Problem: HEMATOLOGIC - ADULT  Goal: Maintains hematologic stability  Description: INTERVENTIONS  - Assess for signs and symptoms of bleeding or hemorrhage  - Monitor labs  - Administer supportive blood products/factors as ordered and appropriate  Outcome: Progressing

## 2023-02-15 NOTE — ANESTHESIA POSTPROCEDURE EVALUATION
Post-Op Assessment Note    CV Status:  Stable  Pain Score: 0    Pain management: adequate     Mental Status:  Alert and awake   Hydration Status:  Euvolemic   PONV Controlled:  Controlled   Airway Patency:  Patent      Post Op Vitals Reviewed: Yes      Staff: CRNA         No notable events documented      BP   109/56   Temp  98 3   Pulse  88   Resp   12   SpO2   98

## 2023-02-15 NOTE — UTILIZATION REVIEW
Initial Clinical Review    OBSERVATION 2-14-23 CHANGED TO INPATIENT 2-14-23 FOR     Admission: Date/Time/Statement:     02/15/23 1634  Inpatient Admission  Once        Transfer Service: Hospitalist       Question Answer   Level of Care Med Surg   Estimated length of stay More than 2 Midnights   Certification I certify that inpatient services are medically necessary for this patient for a duration of greater than two midnights  See H&P and MD Progress Notes for additional information about the patient's course of treatment  02/14/23 0104  Place in Observation  Once        Transfer Service: Hospitalist       Question: Level of Care Answer: Med Surg          ED Arrival Information     Expected   -    Arrival   2/13/2023 20:00    Acuity   Urgent            Means of arrival   Walk-In    Escorted by   Family Member    Service   Hospitalist    Admission type   Emergency            Arrival complaint   rectal bleeding           Chief Complaint   Patient presents with   • Abdominal Pain     Abdominal Pain and bilateral flank pain radiating to the front  X1 month   • Rectal Bleeding     States having bloody stools x1 month  Did not follow up with any PCP       Initial Presentation: 52 y o  male presents to ed from home for evaluation and treatment of abdominal pain with bloody stool  PMHX: DM, PE, SEIZURES, HEMATURIA, SONA CARSON TEAR Clinical assessment significant for LA 2 3, colonic diverticulosis , hepatomegaly and splenomegaly  Initially treated with iv  9% ns bolus, iv benadryl, iv dilaudid x3, iv multi electrolyte 125/hr  Admit to observation for abdominal pain    Date: 2-15-23  Observation changed to inpatient    EGD and colonoscopy today  EGD found gastritis  Colonoscopy found mild erythema possibly rohn's, rectal polyp removed  Continue iv fluids    Date: 2-16-23  Inpatient med surg   Overnight patient with temp 101 4, heart rate 108  Patient hemodynamically stable    Start empiric iv zosyn for possible intraabdominal infection given at 2350 and 0426  Continue iv multi electrolyte 125/hr  Discontinued today at 5971  Hb stable  Table afebrile  ED Triage Vitals   02/13/23 2110 02/13/23 2110 02/13/23 2110 02/13/23 2113 02/13/23 2110   97 8 °F (36 6 °C) 91 19 137/84 96 %      Temporal Monitor         10 - Worst Possible Pain          02/14/23 126 kg (277 lb 12 8 oz)     Additional Vital Signs:     Date/Time Temp Pulse Resp BP MAP (mmHg) SpO2 O2 Device   02/15/23 1355 -- 103 16 116/69 89 95 % None (Room air)   02/15/23 1350 -- 83 16 100/59 73 94 % None (Room air)   02/15/23 1345 -- 86 20 109/56 74 93 % None (Room air)   02/15/23 1341 -- -- -- -- -- -- --   02/15/23 1340 98 1 °F (36 7 °C) 91 12 109/56 74 92 % None (Room air)   02/15/23 05:55:02 97 2 °F (36 2 °C) Abnormal  85 19 120/75 90 97 % --   02/14/23 13:15:29 97 3 °F (36 3 °C) Abnormal  72 18 142/79 100 96 % --   02/14/23 0730 -- 70 18 116/70 -- 93 % None (Room air)   02/14/23 0400 -- 78 -- 119/71 90 94 % --   02/14/23 0330 -- 74 -- 126/76 96 94 % --   02/13/23 2113 -- -- -- 137/84 106 -- --   02/13/23 2110 97 8 °F (36 6 °C) 91 19 -- -- 96 % None (Room air)             Pertinent Labs/Diagnostic Test Results:     2-15 EGD  IMPRESSION:  Gastritis  Grade B erosive reflux esophagitis  Normal duodenum     RECOMMENDATION:    Await pathology results      Continue current medications    2-15 COLONOSCOPY  IMPRESSION:  Mild erythema in the terminal ileum biopsied to rule out mild Crohn's disease  Random biopsies taken from the colon to rule out microscopic colitis  Rectal polyp removed with cold snare     RECOMMENDATION:    Repeat colonoscopy in 3 years     • Personal history of colon polyps             CT abdomen pelvis with contrast   Final (02/14 0935)      No evidence of acute abdominopelvic process allowing for routine abdomen and pelvis CT protocol   Finding reported by the [facility] radiologist on the preliminary report attributed to rectal branch of the IMV rather than contrast extravasation  No gross    colorectal intraluminal hemorrhage  Colonic diverticulosis without diverticulitis  Hepatomegaly and hepatic steatosis  Splenomegaly                Results from last 7 days   Lab Units 02/13/23 2128   SARS-COV-2  Negative     Results from last 7 days   Lab Units 02/15/23  0940 02/14/23 2023 02/14/23  0535 02/14/23 0221 02/13/23 2128   WBC Thousand/uL  --   --  7 03  --  7 02   HEMOGLOBIN g/dL 13 7 13 2 12 9  --  14 7   HEMATOCRIT % 39 1 38 2 37 1  --  42 3   PLATELETS Thousands/uL  --   --  179 172 191   NEUTROS ABS Thousands/µL  --   --  3 37  --  4 02         Results from last 7 days   Lab Units 02/14/23 0535 02/13/23 2128   SODIUM mmol/L 137 133*   POTASSIUM mmol/L 3 8 3 8   CHLORIDE mmol/L 100 97   CO2 mmol/L 27 27   ANION GAP mmol/L 10 9   BUN mg/dL 13 16   CREATININE mg/dL 0 73 0 76   EGFR ml/min/1 73sq m 110 108   CALCIUM mg/dL 8 3* 9 6   MAGNESIUM mg/dL 1 1*  --    PHOSPHORUS mg/dL 4 0  --      Results from last 7 days   Lab Units 02/14/23  0535 02/13/23 2128   AST U/L 18 22   ALT U/L 27 34   ALK PHOS U/L 31* 43   TOTAL PROTEIN g/dL 6 3* 7 3   ALBUMIN g/dL 3 3* 4 0   TOTAL BILIRUBIN mg/dL 0 45 0 34     Results from last 7 days   Lab Units 02/15/23  1242 02/15/23  0603 02/15/23  0201 02/14/23  2049 02/14/23  1731 02/14/23  1439 02/14/23  0732 02/14/23  0213   POC GLUCOSE mg/dl 127 143* 111 140 175* 166* 110 106     Results from last 7 days   Lab Units 02/14/23 0535 02/13/23 2128   GLUCOSE RANDOM mg/dL 119 252*       Results from last 7 days   Lab Units 02/13/23 2128   PROTIME seconds 13 8   INR  1 04   PTT seconds 25             Results from last 7 days   Lab Units 02/14/23  0221 02/13/23 2128   LACTIC ACID mmol/L 1 8 2 3*         Results from last 7 days   Lab Units 02/13/23  2128   LIPASE u/L 17       Results from last 7 days   Lab Units 02/14/23  0019   CLARITY UA  Clear   COLOR UA  Yellow   SPEC GRAV UA  1 025   PH UA  6 0   GLUCOSE UA mg/dl >=1000 (1%)*   KETONES UA mg/dl 15 (1+)*   BLOOD UA  Negative   PROTEIN UA mg/dl Negative   NITRITE UA  Negative   BILIRUBIN UA  Negative   UROBILINOGEN UA E U /dl 0 2   LEUKOCYTES UA  Elevated glucose may cause decreased leukocyte values   See urine microscopic for [facility] result/*   WBC UA /hpf 0-1   RBC UA /hpf None Seen   BACTERIA UA /hpf Occasional   EPITHELIAL CELLS WET PREP /hpf None Seen     Results from last 7 days   Lab Units 02/13/23 2128   INFLUENZA A PCR  Negative   INFLUENZA B PCR  Negative   RSV PCR  Negative       ED Treatment:   Medication Administration from 02/13/2023 2000 to 02/14/2023 1256       Date/Time Order Dose Route Action     02/13/2023 2151 EST sodium chloride 0 9 % bolus 1,000 mL 1,000 mL Intravenous New Bag     02/13/2023 2151 EST diphenhydrAMINE (BENADRYL) injection 50 mg 50 mg Intravenous Given     02/13/2023 2212 EST HYDROmorphone (DILAUDID) injection 0 5 mg 0 5 mg Intravenous Given     02/14/2023 0926 EST aspirin chewable tablet 81 mg 81 mg Oral Given     02/14/2023 0926 EST atorvastatin (LIPITOR) tablet 10 mg 10 mg Oral Given     02/14/2023 0926 EST divalproex sodium (DEPAKOTE) EC tablet 500 mg 500 mg Oral Given     02/14/2023 0927 EST fluticasone (FLOVENT HFA) 110 MCG/ACT inhaler 2 puff 2 puff Inhalation Given     02/14/2023 0925 EST lisinopril (ZESTRIL) tablet 10 mg 10 mg Oral Given     02/14/2023 0927 EST magnesium Oxide (MAG-OX) tablet 400 mg 400 mg Oral Given     02/14/2023 0926 EST risperiDONE (RisperDAL) tablet 2 mg 2 mg Oral Given     02/14/2023 0217 EST multi-electrolyte (PLASMALYTE-A/ISOLYTE-S PH 7 4) IV solution 125 mL/hr Intravenous New Bag     02/14/2023 0530 EST heparin (porcine) subcutaneous injection 5,000 Units 5,000 Units Subcutaneous Given     02/14/2023 0931 EST HYDROmorphone (DILAUDID) injection 0 5 mg 0 5 mg Intravenous Given     02/14/2023 0422 EST HYDROmorphone (DILAUDID) injection 0 5 mg 0 5 mg Intravenous Given        Past Medical History:   Diagnosis   • Allergic rhinitis    resolved 6/20/17   • Asthma    resolved 6/20/17   • Bipolar 1 disorder ([facility] )    resolved 6/20/17   • Chronic kidney disease   • Diabetes mellitus ([facility] )   • Hematuria    resolved 6/20/17   • Hyperlipidemia   • Leukocytosis    resolved 6/20/17   • Diana-Hussein tear   • Pulmonary emboli ([facility] )    resolved 6/20/17   • Seizures ([place] [address] )   • Sleep apnea    resolved 6/20/17     Present on Admission:  • Mixed dyslipidemia  • Type 2 diabetes mellitus without complication, without long-term current use of insulin ([facility])      Admitting Diagnosis:     Rectal bleeding [K62 5]  Abdominal pain [R10 9]  Lower GI bleed [K92 2]  Hematemesis, unspecified whether nausea present [K92 0]    Age/Sex: 52 y o  male    Scheduled Medications:    aspirin, 81 mg, Oral, Daily  atorvastatin, 10 mg, Oral, Daily  divalproex sodium, 500 mg, Oral, BID  fluticasone, 2 puff, Inhalation, Q12H [address]  insulin lispro, 2-12 Units, Subcutaneous, Q6H  lisinopril, 10 mg, Oral, Daily  magnesium Oxide, 400 mg, Oral, Daily  risperiDONE, 2 mg, Oral, Daily    piperacillin-tazobactam (ZOSYN) 3 375 g in sodium chloride 0 9 % 100 mL IVPB  Dose: 3 375 g  Freq: Every 6 hours Route: IV  Last Dose: 3 375 g (02/16/23 0436)  Start: 02/15/23 2245 End: 02/16/23 0925    Continuous IV Infusions:  lactated ringers, 20 mL/hr, Intravenous, Continuous  multi-electrolyte, 125 mL/hr, Intravenous, Continuous      PRN Meds:  albuterol, 2 puff, Inhalation, Q4H PRN  HYDROmorphone, 0 5 mg, Intravenous, Q3H PRN  ondansetron, 4 mg, Intravenous, Q6H PRN        IP CONSULT TO GASTROENTEROLOGY    Network Utilization Review Department  ATTENTION: Please call with any questions or concerns to [phone] and carefully listen to the prompts so that you are directed to the right person   All voicemails are confidential   Danuta Fraire all requests for admission clinical reviews, approved or denied determinations and any other requests to dedicated fax number below belonging to the campus where the patient is receiving treatment   List of dedicated fax numbers for the Facilities:  [address] [place] [address] DENIALS (Administrative/Medical Necessity) [phone]   [address]  (Maternity/NICU/Pediatrics) [phone]   [address] [phone]   [address] [phone]   [address] [place] [address] [facility] [phone]   [facility] [address] [facility] [phone]

## 2023-02-15 NOTE — PLAN OF CARE
Problem: PAIN - ADULT  Goal: Verbalizes/displays adequate comfort level or baseline comfort level  Description: Interventions:  - Encourage patient to monitor pain and request assistance  - Assess pain using appropriate pain scale  - Administer analgesics based on type and severity of pain and evaluate response  - Implement non-pharmacological measures as appropriate and evaluate response  - Consider cultural and social influences on pain and pain management  - Notify physician/advanced practitioner if interventions unsuccessful or patient reports new pain  Outcome: Progressing     Problem: INFECTION - ADULT  Goal: Absence or prevention of progression during hospitalization  Description: INTERVENTIONS:  - Assess and monitor for signs and symptoms of infection  - Monitor lab/diagnostic results  - Monitor all insertion sites, i e  indwelling lines, tubes, and drains  - Monitor endotracheal if appropriate and nasal secretions for changes in amount and color  - [place] appropriate cooling/warming therapies per order  - Administer medications as ordered  - Instruct and encourage patient and family to use good hand hygiene technique  - Identify and instruct in appropriate isolation precautions for identified infection/condition  Outcome: Progressing  Goal: Absence of fever/infection during neutropenic period  Description: INTERVENTIONS:  - Monitor WBC    Outcome: Progressing     Problem: DISCHARGE PLANNING  Goal: Discharge to home or other facility with appropriate resources  Description: INTERVENTIONS:  - Identify barriers to discharge w/patient and caregiver  - Arrange for needed discharge resources and transportation as appropriate  - Identify discharge learning needs (meds, wound care, etc )  - Arrange for interpretive services to assist at discharge as needed  - Refer to Case Management Department for coordinating discharge planning if the patient needs post-hospital services based on physician/advanced practitioner order or complex needs related to functional status, cognitive ability, or social support system  Outcome: Progressing     Problem: Knowledge Deficit  Goal: Patient/family/caregiver demonstrates understanding of disease process, treatment plan, medications, and discharge instructions  Description: Complete learning assessment and assess knowledge base    Interventions:  - Provide teaching at level of understanding  - Provide teaching via preferred learning methods  Outcome: Progressing     Problem: METABOLIC, FLUID AND ELECTROLYTES - ADULT  Goal: Electrolytes maintained within normal limits  Description: INTERVENTIONS:  - Monitor labs and assess patient for signs and symptoms of electrolyte imbalances  - Administer electrolyte replacement as ordered  - Monitor response to electrolyte replacements, including repeat lab results as appropriate  - Instruct patient on fluid and nutrition as appropriate  Outcome: Progressing  Goal: Fluid balance maintained  Description: INTERVENTIONS:  - Monitor labs   - Monitor I/O and WT  - Instruct patient on fluid and nutrition as appropriate  - Assess for signs & symptoms of volume excess or deficit  Outcome: Progressing     Problem: HEMATOLOGIC - ADULT  Goal: Maintains hematologic stability  Description: INTERVENTIONS  - Assess for signs and symptoms of bleeding or hemorrhage  - Monitor labs  - Administer supportive blood products/factors as ordered and appropriate  Outcome: Progressing

## 2023-02-15 NOTE — ASSESSMENT & PLAN NOTE
· Patient presents with abdominal pain, rectal bleeding x1 month  States pain is intermittent as well as bleeding  · White count within normal limits  · Hemoglobin stable  · CT abdomen pelvis within normal limits    No evidence of any acute intra-abdominal processes  · Abdominal pain improved  · See plan as outlined below

## 2023-02-16 ENCOUNTER — TRANSITIONAL CARE MANAGEMENT (OUTPATIENT)
Dept: FAMILY MEDICINE CLINIC | Facility: CLINIC | Age: 48
End: 2023-02-16

## 2023-02-16 VITALS
WEIGHT: 277.8 LBS | HEIGHT: 68 IN | OXYGEN SATURATION: 100 % | TEMPERATURE: 98.4 F | BODY MASS INDEX: 42.1 KG/M2 | SYSTOLIC BLOOD PRESSURE: 135 MMHG | DIASTOLIC BLOOD PRESSURE: 85 MMHG | HEART RATE: 108 BPM | RESPIRATION RATE: 18 BRPM

## 2023-02-16 LAB — GLUCOSE SERPL-MCNC: 126 MG/DL (ref 65–140)

## 2023-02-16 RX ADMIN — RISPERIDONE 2 MG: 1 TABLET ORAL at 08:06

## 2023-02-16 RX ADMIN — MAGNESIUM OXIDE TAB 400 MG (241.3 MG ELEMENTAL MG) 400 MG: 400 (241.3 MG) TAB at 08:06

## 2023-02-16 RX ADMIN — LISINOPRIL 10 MG: 5 TABLET ORAL at 08:06

## 2023-02-16 RX ADMIN — DIVALPROEX SODIUM 500 MG: 250 TABLET, DELAYED RELEASE ORAL at 08:05

## 2023-02-16 RX ADMIN — PIPERACILLIN AND TAZOBACTAM 3.38 G: 3; .375 INJECTION, POWDER, FOR SOLUTION INTRAVENOUS at 04:36

## 2023-02-16 RX ADMIN — ASPIRIN 81 MG CHEWABLE TABLET 81 MG: 81 TABLET CHEWABLE at 08:05

## 2023-02-16 NOTE — MALNUTRITION/BMI
This medical record reflects morbid obesity  BMI 42 24, class 3 morbid obesity  Diet education will be offered prior to discharge  BMI Findings:  Adult BMI Classifications: Morbid Obesity 40-44 9        Body mass index is 42 24 kg/m²  See Nutrition note dated 2/15/2023 for additional details  Completed nutrition assessment is viewable in the nutrition documentation

## 2023-02-16 NOTE — UTILIZATION REVIEW
NOTIFICATION OF INPATIENT ADMISSION   AUTHORIZATION REQUEST   SERVICING FACILITY:   [address] [place] [address]  [address]  Tax ID:  [id number]  NPI: [id number] ATTENDING PROVIDER:  Attending Name and NPI#: Jefe Ferraro [[id number]]  Address: [facility]  [address]  Phone: [phone]     ADMISSION INFORMATION:  Place of Service: Inpatient [facility]  [address]  Place of Service Code: 21  Inpatient Admission Date/Time: 2/15/23  4:33 PM  Discharge Date/Time: No discharge date for patient encounter  Admitting Diagnosis Code/Description:  Rectal bleeding [K62 5]  Abdominal pain [R10 9]  Lower GI bleed [K92 2]  Hematemesis, unspecified whether nausea present [K92 0]     UTILIZATION REVIEW CONTACT:  Terri Allen Utilization   Network Utilization Review Department  Phone: [phone]  Fax [phone]  Email: [email or web address]  Contact for approvals/pending authorizations, clinical reviews, and discharge  PHYSICIAN ADVISORY SERVICES:  Medical Necessity Denial & Yyyg-gy-Xhpj Review  Phone: [phone]  Fax: [phone]  Email: [email or web address]

## 2023-02-16 NOTE — PLAN OF CARE
Problem: PAIN - ADULT  Goal: Verbalizes/displays adequate comfort level or baseline comfort level  Description: Interventions:  - Encourage patient to monitor pain and request assistance  - Assess pain using appropriate pain scale  - Administer analgesics based on type and severity of pain and evaluate response  - Implement non-pharmacological measures as appropriate and evaluate response  - Consider cultural and social influences on pain and pain management  - Notify physician/advanced practitioner if interventions unsuccessful or patient reports new pain  Outcome: Progressing     Problem: INFECTION - ADULT  Goal: Absence or prevention of progression during hospitalization  Description: INTERVENTIONS:  - Assess and monitor for signs and symptoms of infection  - Monitor lab/diagnostic results  - Monitor all insertion sites, i e  indwelling lines, tubes, and drains  - Monitor endotracheal if appropriate and nasal secretions for changes in amount and color  - [place] appropriate cooling/warming therapies per order  - Administer medications as ordered  - Instruct and encourage patient and family to use good hand hygiene technique  - Identify and instruct in appropriate isolation precautions for identified infection/condition  Outcome: Progressing  Goal: Absence of fever/infection during neutropenic period  Description: INTERVENTIONS:  - Monitor WBC    Outcome: Progressing     Problem: DISCHARGE PLANNING  Goal: Discharge to home or other facility with appropriate resources  Description: INTERVENTIONS:  - Identify barriers to discharge w/patient and caregiver  - Arrange for needed discharge resources and transportation as appropriate  - Identify discharge learning needs (meds, wound care, etc )  - Arrange for interpretive services to assist at discharge as needed  - Refer to Case Management Department for coordinating discharge planning if the patient needs post-hospital services based on physician/advanced practitioner order or complex needs related to functional status, cognitive ability, or social support system  Outcome: Progressing     Problem: Knowledge Deficit  Goal: Patient/family/caregiver demonstrates understanding of disease process, treatment plan, medications, and discharge instructions  Description: Complete learning assessment and assess knowledge base  Interventions:  - Provide teaching at level of understanding  - Provide teaching via preferred learning methods  Outcome: Progressing     Problem: METABOLIC, FLUID AND ELECTROLYTES - ADULT  Goal: Electrolytes maintained within normal limits  Description: INTERVENTIONS:  - Monitor labs and assess patient for signs and symptoms of electrolyte imbalances  - Administer electrolyte replacement as ordered  - Monitor response to electrolyte replacements, including repeat lab results as appropriate  - Instruct patient on fluid and nutrition as appropriate  Outcome: Progressing  Goal: Fluid balance maintained  Description: INTERVENTIONS:  - Monitor labs   - Monitor I/O and WT  - Instruct patient on fluid and nutrition as appropriate  - Assess for signs & symptoms of volume excess or deficit  Outcome: Progressing     Problem: HEMATOLOGIC - ADULT  Goal: Maintains hematologic stability  Description: INTERVENTIONS  - Assess for signs and symptoms of bleeding or hemorrhage  - Monitor labs  - Administer supportive blood products/factors as ordered and appropriate  Outcome: Progressing     Problem: Nutrition/Hydration-ADULT  Goal: Nutrient/Hydration intake appropriate for improving, restoring or maintaining nutritional needs  Description: Monitor and assess patient's nutrition/hydration status for malnutrition  Collaborate with interdisciplinary team and initiate plan and interventions as ordered  Monitor patient's weight and dietary intake as ordered or per policy  Utilize nutrition screening tool and intervene as necessary   Determine patient's food preferences and provide high-protein, high-caloric foods as appropriate       INTERVENTIONS:  - Monitor oral intake, urinary output, labs, and treatment plans  - Assess nutrition and hydration status and recommend course of action  - Evaluate amount of meals eaten  - Assist patient with eating if necessary   - Allow adequate time for meals  - Recommend/ encourage appropriate diets, oral nutritional supplements, and vitamin/mineral supplements  - Order, calculate, and assess calorie counts as needed  - Recommend, monitor, and adjust tube feedings and TPN/PPN based on assessed needs  - Assess need for intravenous fluids  - Provide specific nutrition/hydration education as appropriate  - Include patient/family/caregiver in decisions related to nutrition  Outcome: Progressing

## 2023-02-16 NOTE — CASE MANAGEMENT
Case Management Discharge Planning Note    Patient name Jose D Mahoney  Location [id number] MRN [id number]  : 1975 Date 2023       Current Admission Date: 2023  Current Admission Diagnosis:Abdominal pain   Patient Active Problem List    Diagnosis Date Noted   • Abdominal pain 2023   • Rectal bleeding 2023   • Mixed dyslipidemia 09/10/2020   • Morbid obesity ([address] ) 09/10/2020   • Chronic pain of left knee 08/10/2020   • Lumbar disc disease with radiculopathy 2020   • Chronic left-sided low back pain with left-sided sciatica 2020   • Acute pain of left wrist 2019   • Obstructive sleep apnea syndrome 2019   • Hypomagnesemia 2019   • Atypical chest pain 2019   • Dizziness 2019   • History of pulmonary embolus (PE) 2018   • Bipolar 1 disorder ([facility] ) 2018   • Mild intermittent asthma without complication    • Hematemesis without nausea 2018   • Right calf pain 2018   • Type 2 diabetes mellitus without complication, without long-term current use of insulin ([facility] ) 2012      LOS (days): 1  Geometric Mean LOS (GMLOS) (days): 2 60  Days to GMLOS:1 8     OBJECTIVE:  Risk of Unplanned Readmission Score: 9 81         Current admission status: Inpatient   Preferred Pharmacy:   [address] [place] [address], [address] [place] [id number]  Phone: [phone] Fax: [phone]    Primary Care Provider: Courtney Burks DO    Primary Insurance: [address] [place] [address]  Secondary Insurance:     DISCHARGE DETAILS:    Discharge planning discussed with[de-identified] Pt  Freedom of Choice: Yes     CM contacted family/caregiver?: No- see comments (Pt is alert and oriented)  Were Treatment Team discharge recommendations reviewed with patient/caregiver?: Yes  Did patient/caregiver verbalize understanding of patient care needs?: Yes  Were patient/caregiver advised of the risks associated with not following Treatment Team discharge recommendations?: Yes         [address]         Is the patient interested in [place] [address] at discharge?: No    DME Referral Provided  Referral made for DME?: No      Treatment Team Recommendation: Home  Discharge Destination Plan[de-identified] Home  Transport at Discharge : Automobile, Family       Discussed with patient preferences on discharge : Understanding how to manage health at home , purpose of taking meds, importance of follow up appointments and symptoms to watch out for  Nurse to review AVS with patient  All follow up providers listed

## 2023-02-16 NOTE — ASSESSMENT & PLAN NOTE
· Presents with rectal bleeding x1 month  As well, notes abdominal pain    · Hemoglobin stable  · Since resolved  · EGD showed gastritis  · Colonoscopy with no acute abnormalities  · We will follow-up outpatient with GI

## 2023-02-16 NOTE — ASSESSMENT & PLAN NOTE
· Patient presents with abdominal pain, rectal bleeding x1 month  States pain is intermittent as well as bleeding  · White count within normal limits  · Hemoglobin stable  · CT abdomen pelvis within normal limits    No evidence of any acute intra-abdominal processes  · Has since resolved

## 2023-02-16 NOTE — DISCHARGE SUMMARY
[address] [address]  Discharge- Dean Case 1975, 52 y o  male MRN: [id number]  Unit/Bed#: 899-73 Encounter: [id number]  Primary Care Provider: Venus Valentin DO   Date and time admitted to hospital: 2/13/2023  9:06 PM    * Abdominal pain  Assessment & Plan  · Patient presents with abdominal pain, rectal bleeding x1 month  States pain is intermittent as well as bleeding  · White count within normal limits  · Hemoglobin stable  · CT abdomen pelvis within normal limits  No evidence of any acute intra-abdominal processes  · Has since resolved    Rectal bleeding  Assessment & Plan  · Presents with rectal bleeding x1 month  As well, notes abdominal pain    · Hemoglobin stable  · Since resolved  · EGD showed gastritis  · Colonoscopy with no acute abnormalities  · We will follow-up outpatient with GI    Mixed dyslipidemia  Assessment & Plan  · Continue statin    Type 2 diabetes mellitus without complication, without long-term current use of insulin ([facility] )  Assessment & Plan  · Can resume home diabetic medications      Discharging Physician / Practitioner: Dre Serrano MD  PCP: Venus Valentin DO  Admission Date:   Admission Orders (From admission, onward)     Ordered        02/15/23 1633  Inpatient Admission  Once            02/14/23 0103  Place in Observation  Once            02/14/23 0103  Place in Observation  Once                      Discharge Date: 02/16/23    Medical Problems     Resolved Problems  Date Reviewed: 2/16/2023   None         Consultations During Hospital Stay:  · GI    Procedures Performed:   · Egd, colonoscopy    Significant Findings / Test Results:   EGD    Result Date: 2/15/2023  Impression: Gastritis Grade B erosive reflux esophagitis Normal duodenum RECOMMENDATION:  Await pathology results Continue current medications Colonoscopy to follow   Harmony Golden MD     Colonoscopy    Result Date: 2/15/2023  Impression: Mild erythema in the terminal ileum biopsied to rule out mild Crohn's disease Random biopsies taken from the colon to rule out microscopic colitis Rectal polyp removed with cold snare RECOMMENDATION:  Repeat colonoscopy in 3 years  Personal history of colon polyps  Return to floor and resume diet Stable for discharge from a GI standpoint  Marcel Kussmaul, MD     CT abdomen pelvis with contrast    Result Date: 2/14/2023  · Impression: No evidence of acute abdominopelvic process allowing for routine abdomen and pelvis CT protocol  Finding reported by the [facility] radiologist on the preliminary report attributed to rectal branch of the IMV rather than contrast extravasation  No gross colorectal intraluminal hemorrhage  Colonic diverticulosis without diverticulitis  Hepatomegaly and hepatic steatosis  Splenomegaly  The major findings are in agreement with the preliminary report provided by [facility] which was provided shortly after completion of the exam  The finding of tiny rectal branch of the IMV rather than rectal bleed will now be communicated with patient's clinical team by our radiology liaison  The study was marked in [facility] for immediate notification  Workstation performed: [id number]     Incidental Findings:   · none    Test Results Pending at Discharge (will require follow up):   · none     Outpatient Tests Requested:  · none    Complications:  none    Reason for Admission: [address] [place] [address] Course:     Jose Burnett is a 52 y o  male patient who originally presented to the hospital on 2/13/2023 with past medical history significant type 2 diabetes, hyperlipidemia presented with rectal bleeding  Had an EGD and colonoscopy performed  EGD showed gastritis  Hemoglobin remained stable  No further bleeding  We will follow-up outpatient with GI and PCP      Please see above list of diagnoses and related plan for additional information       Condition at Discharge: stable     Discharge Day Visit / Exam:     Subjective:   Denies fevers, chills, cough, chest pain  Exam:   Physical Exam  Constitutional:       General: He is not in acute distress  Appearance: He is well-developed  He is not diaphoretic  HENT:      Head: Normocephalic and atraumatic  Nose: Nose normal       Mouth/Throat:      Pharynx: No oropharyngeal exudate  Eyes:      General: No scleral icterus  Conjunctiva/sclera: Conjunctivae normal    Cardiovascular:      Rate and Rhythm: Normal rate and regular rhythm  Heart sounds: Normal heart sounds  No murmur heard  No friction rub  No gallop  Pulmonary:      Effort: Pulmonary effort is normal  No respiratory distress  Breath sounds: Normal breath sounds  No wheezing or rales  Chest:      Chest wall: No tenderness  Abdominal:      General: Bowel sounds are normal  There is no distension  Palpations: Abdomen is soft  Tenderness: There is no abdominal tenderness  There is no guarding  Musculoskeletal:         General: No tenderness or deformity  Normal range of motion  Cervical back: Normal range of motion and neck supple  Skin:     General: Skin is warm and dry  Findings: No erythema  Neurological:      Mental Status: He is alert  Mental status is at baseline  Discharge instructions/Information to patient and family:   See after visit summary for information provided to patient and family  Provisions for Follow-Up Care:  See after visit summary for information related to follow-up care and any pertinent home health orders  Disposition:     Home    For Discharges to [facility] SNF:   · Not Applicable to this Patient - Not Applicable to this Patient    Planned Readmission: none     Discharge Statement:  I spent 60 minutes discharging the patient  This time was spent on the day of discharge  I had direct contact with the patient on the day of discharge   Greater than 50% of the total time was spent examining patient, answering all patient questions, arranging and discussing plan of care with patient as well as directly providing post-discharge instructions  Additional time then spent on discharge activities  Discharge Medications:  See after visit summary for reconciled discharge medications provided to patient and family        ** Please Note: This note has been constructed using a voice recognition system **

## 2023-02-16 NOTE — QUICK NOTE
Notified by RN of fever 101 4F  Chart reviewed  Patient hemodynamically stable  UA 2/14/23 neg for pyuria  CT abd/pelvis 2/14/23 no acute process  CXR ordered( ? Aspiration pneumonia ), as well as blood cxs & serum lactate  Empiric IV zosyn commenced( ? Intraabdominal infection )   Will continue to monitor overnight & signout in AM to rounding hospitalist

## 2023-02-17 ENCOUNTER — TELEPHONE (OUTPATIENT)
Dept: FAMILY MEDICINE CLINIC | Facility: CLINIC | Age: 48
End: 2023-02-17

## 2023-02-17 DIAGNOSIS — E11.9 TYPE 2 DIABETES MELLITUS WITHOUT COMPLICATION, WITHOUT LONG-TERM CURRENT USE OF INSULIN (HCC): ICD-10-CM

## 2023-02-17 NOTE — ANESTHESIA PREPROCEDURE EVALUATION
Procedure:  COLONOSCOPY  EGD    Relevant Problems   CARDIO   (+) Atypical chest pain      ENDO   (+) Type 2 diabetes mellitus without complication, without long-term current use of insulin (HCC)      GI/HEPATIC   (+) Hematemesis without nausea   (+) Rectal bleeding      MUSCULOSKELETAL   (+) Chronic left-sided low back pain with left-sided sciatica      NEURO/PSYCH   (+) Chronic left-sided low back pain with left-sided sciatica   (+) History of pulmonary embolus (PE)      PULMONARY   (+) Mild intermittent asthma without complication   (+) Obstructive sleep apnea syndrome        Physical Exam    Airway    Mallampati score: II  TM Distance: >3 FB  Neck ROM: full     Dental       Cardiovascular      Pulmonary      Other Findings        Anesthesia Plan  ASA Score- 3     Anesthesia Type- IV sedation with anesthesia with ASA Monitors  Additional Monitors:   Airway Plan:           Plan Factors-Exercise tolerance (METS): >4 METS  Chart reviewed  Existing labs reviewed  Patient summary reviewed  Induction- intravenous  Postoperative Plan-     Informed Consent- Anesthetic plan and risks discussed with patient  I personally reviewed this patient with the CRNA  Discussed and agreed on the Anesthesia Plan with the CRNA  Nic Lopez

## 2023-02-19 LAB
BACTERIA BLD CULT: NORMAL
BACTERIA BLD CULT: NORMAL

## 2023-02-21 LAB
BACTERIA BLD CULT: NORMAL
BACTERIA BLD CULT: NORMAL

## 2023-02-25 DIAGNOSIS — K50.011 CROHN'S DISEASE OF ILEUM WITH RECTAL BLEEDING (HCC): Primary | ICD-10-CM

## 2023-02-25 RX ORDER — BUDESONIDE 3 MG/1
9 CAPSULE, COATED PELLETS ORAL DAILY
Qty: 90 CAPSULE | Refills: 5 | Status: SHIPPED | OUTPATIENT
Start: 2023-02-25

## 2023-02-26 NOTE — RESULT ENCOUNTER NOTE
I called him with his results  His biopsies might indicate mild Crohn's disease involving the terminal ileum  I will start him on budesonide 9mg daily and asked him to follow up in the office  If his symptoms don't improve after 6-8 weeks, I'll repeat his colonoscopy to repeat his ileum biopsies

## 2023-03-01 ENCOUNTER — TRANSCRIBE ORDERS (OUTPATIENT)
Dept: GASTROENTEROLOGY | Facility: CLINIC | Age: 48
End: 2023-03-01

## 2023-03-01 ENCOUNTER — OFFICE VISIT (OUTPATIENT)
Dept: FAMILY MEDICINE CLINIC | Facility: CLINIC | Age: 48
End: 2023-03-01

## 2023-03-01 VITALS
TEMPERATURE: 98.6 F | BODY MASS INDEX: 41.49 KG/M2 | OXYGEN SATURATION: 97 % | HEIGHT: 68 IN | WEIGHT: 273.8 LBS | SYSTOLIC BLOOD PRESSURE: 104 MMHG | HEART RATE: 101 BPM | DIASTOLIC BLOOD PRESSURE: 78 MMHG

## 2023-03-01 DIAGNOSIS — E66.01 MORBID OBESITY WITH BMI OF 40.0-44.9, ADULT (HCC): ICD-10-CM

## 2023-03-01 DIAGNOSIS — K50.011 CROHN'S DISEASE OF ILEUM WITH RECTAL BLEEDING (HCC): Primary | ICD-10-CM

## 2023-03-01 DIAGNOSIS — E11.9 TYPE 2 DIABETES MELLITUS WITHOUT COMPLICATION, WITHOUT LONG-TERM CURRENT USE OF INSULIN (HCC): ICD-10-CM

## 2023-03-01 DIAGNOSIS — E78.2 MIXED DYSLIPIDEMIA: ICD-10-CM

## 2023-03-01 LAB — SL AMB POCT HEMOGLOBIN AIC: 8 (ref ?–6.5)

## 2023-03-01 RX ORDER — ATORVASTATIN CALCIUM 10 MG/1
10 TABLET, FILM COATED ORAL DAILY
Qty: 30 TABLET | Refills: 5 | Status: SHIPPED | OUTPATIENT
Start: 2023-03-01

## 2023-03-01 RX ORDER — LISINOPRIL 10 MG/1
10 TABLET ORAL DAILY
Qty: 30 TABLET | Refills: 5 | Status: SHIPPED | OUTPATIENT
Start: 2023-03-01

## 2023-03-01 NOTE — PROGRESS NOTES
Transition of Care  Follow-up After Hospitalization    Talisha Mills 52 y o  male   Date:  3/1/2023    TCM Call     Date and time call was made  2/16/2023 11:38 AM    Hospital care reviewed  Records reviewed    Patient was hospitialized at  [address] [facility]    Date of Admission  02/13/23    Date of discharge  02/16/23    Diagnosis  abdominal pain, rectal bleeding, DM II    Disposition  Home    Were the patients medications reviewed and updated  No    Current Symptoms  --  wierd heart rythm, normal for him       TCM Call     Post hospital issues  None    Should patient be enrolled in anticoag monitoring? No  was on a while ago, but was taken off due to insurance issues    Scheduled for follow up? Yes    Patient refusal reason  PT WILL SEE CARDIO IF NEEDED    Patients specialists  --  gastroenterolgy    Cardiologist name  Claudio Fowler    Other specialists names  DR Berto Finney 9/18/20    Did you obtain your prescribed medications  Yes    Do you need help managing your prescriptions or medications  No    Is transportation to your appointment needed  No    I have advised the patient to call PCP with any new or worsening symptoms  Thor Bamberger,  II    Living Arrangements  Spouse or Significiant other    Support System  Family; Neighboors; Friends    The type of support provided  Emotional; Physical    Do you have social support  Yes, as much as I need    Are you recieving any outpatient services  No    Are you recieving home care services  No    Are you using any community resources  No    Current waiver services  No    Have you fallen in the last 12 months  No    Interperter language line needed  No    Counseling  Patient        Hospital records were reviewed  Medications upon discharge reviewed/updated  Discharge Disposition:    Follow up visits with other specialists:       Assessment and Plan:  Hemoglobin A1c 8 0  Patient will continue to watch his diet    He will start the budesonide for his Crohn's disease  I will see him back in 4 months, he will get blood work before that visit  Brianne Connell was seen today for transition of care management  Diagnoses and all orders for this visit:    Crohn's disease of ileum with rectal bleeding ([facility])  -     CBC and differential; Future  -     TSH, 3rd generation with Free T4 reflex; Future    Type 2 diabetes mellitus without complication, without long-term current use of insulin ([facility])  -     IRIS Diabetic eye exam  -     POCT hemoglobin A1c  -     atorvastatin (LIPITOR) 10 mg tablet; Take 1 tablet (10 mg total) by mouth daily  -     lisinopril (ZESTRIL) 10 mg tablet; Take 1 tablet (10 mg total) by mouth daily  -     metFORMIN (GLUCOPHAGE) 1000 MG tablet; Take 1 tablet (1,000 mg total) by mouth 2 (two) times a day  -     Hemoglobin A1C; Future  -     TSH, 3rd generation with Free T4 reflex; Future  -     Microalbumin / creatinine urine ratio; Future    Mixed dyslipidemia  -     Comprehensive metabolic panel; Future  -     Lipid Panel with Direct LDL reflex; Future  -     TSH, 3rd generation with Free T4 reflex; Future    Morbid obesity with BMI of 40 0-44 9, adult ([facility])          HPI:  ELVIRA  Patient was admitted for rectal bleeding  Found to have Crohn's disease  GI just called in a prescription for budesonide for him to start  Otherwise patient has been taking his metformin  Needs refills on his lisinopril and atorvastatin  ROS: Review of Systems   Constitutional: Negative  Respiratory: Negative  Cardiovascular: Negative  Gastrointestinal: Negative  Genitourinary: Negative          Past Medical History:   Diagnosis Date   • Allergic rhinitis     resolved 6/20/17   • Asthma     resolved 6/20/17   • Bipolar 1 disorder ([facility] [address] )     resolved 6/20/17   • Chronic kidney disease    • Diabetes mellitus ([facility] [address] )    • Hematuria     resolved 6/20/17   • Hyperlipidemia    • Leukocytosis     resolved 6/20/17   • Diana-Hussein tear    • Pulmonary emboli ([facility] [address] ) resolved 6/20/17   • Seizures ([facility] [address] )    • Sleep apnea     resolved 6/20/17       Past Surgical History:   Procedure Laterality Date   • ANGIOPLASTY      previous stent placement   • CARDIAC SURGERY     • NASAL SEPTUM SURGERY         Social History     Socioeconomic History   • Marital status: /Civil Union     Spouse name: None   • Number of children: None   • Years of education: None   • Highest education level: None   Occupational History   • None   Tobacco Use   • Smoking status: Never   • Smokeless tobacco: Former     Types: Chew   Vaping Use   • Vaping Use: Never used   Substance and Sexual Activity   • Alcohol use: Never     Alcohol/week: 0 0 standard drinks     Comment: occasional as per [employer]   • Drug use: No   • Sexual activity: None   Other Topics Concern   • None   Social History Narrative   • None     Social Determinants of Health     Financial Resource Strain: Not on file   Food Insecurity: No Food Insecurity   • Worried About Running Out of Food in the Last Year: Never true   • Ran Out of Food in the Last Year: Never true   Transportation Needs: Not on file   Physical Activity: Not on file   Stress: Not on file   Social Connections: Not on file   Intimate Partner Violence: Not on file   Housing Stability: Low Risk    • Unable to Pay for Housing in the Last Year: No   • Number of Places Lived in the Last Year: 1   • Unstable Housing in the Last Year: No       Family History   Problem Relation Age of Onset   • Cancer Mother    • Diabetes Mother    • Hypertension Mother    • Asthma Father    • Cancer Paternal Grandfather    • Colon cancer Family        Allergies   Allergen Reactions   • Coconut Oil - Food Allergy Throat Swelling   • Epinephrine Other (See Comments)     "pass out"   • Iodine - Food Allergy Other (See Comments)     shellfish   • Other Other (See Comments)     NAUSEA VOMITING TO SHELLFISH   • Shellfish-Derived Products - Food Allergy GI Intolerance         Current Outpatient Medications:   •  aspirin 81 mg chewable tablet, Chew 81 mg daily, Disp: , Rfl:   •  atorvastatin (LIPITOR) 10 mg tablet, Take 1 tablet (10 mg total) by mouth daily, Disp: 30 tablet, Rfl: 5  •  Blood Glucose Monitoring Suppl (ONE TOUCH ULTRA 2) w/Device KIT, by Does not apply route 2 (two) times a day, Disp: 1 each, Rfl: 0  •  budesonide (ENTOCORT EC) 3 MG capsule, Take 3 capsules (9 mg total) by mouth daily, Disp: 90 capsule, Rfl: 5  •  divalproex sodium (DEPAKOTE) 500 mg EC tablet, Take 500 mg by mouth 2 (two) times a day , Disp: , Rfl:   •  lisinopril (ZESTRIL) 10 mg tablet, Take 1 tablet (10 mg total) by mouth daily, Disp: 30 tablet, Rfl: 5  •  metFORMIN (GLUCOPHAGE) 1000 MG tablet, Take 1 tablet (1,000 mg total) by mouth 2 (two) times a day, Disp: 60 tablet, Rfl: 5  •  risperiDONE (RisperDAL) 2 mg tablet, Take 1 tablet (2 mg total) by mouth 2 (two) times a day, Disp: 30 tablet, Rfl: 0      Physical Exam:  /78   Pulse 101   Temp 98 6 °F (37 °C)   Ht 5' 8" (1 727 m)   Wt 124 kg (273 lb 12 8 oz)   SpO2 97%   BMI 41 63 kg/m²     Physical Exam  Vitals reviewed  Constitutional:       General: He is not in acute distress  Appearance: Normal appearance  He is well-developed  He is not ill-appearing, toxic-appearing or diaphoretic  HENT:      Head: Normocephalic and atraumatic  Eyes:      Conjunctiva/sclera: Conjunctivae normal    Cardiovascular:      Rate and Rhythm: Normal rate and regular rhythm  Pulses: no weak pulses          Dorsalis pedis pulses are 2+ on the right side and 2+ on the left side  Posterior tibial pulses are 2+ on the right side and 2+ on the left side  Heart sounds: Normal heart sounds  No murmur heard  No friction rub  No gallop  Pulmonary:      Effort: Pulmonary effort is normal  No respiratory distress  Breath sounds: Normal breath sounds  No wheezing, rhonchi or rales  Musculoskeletal:      Right lower leg: No edema        Left lower leg: No edema  Feet:      Right foot:      Skin integrity: No ulcer, skin breakdown, erythema, warmth, callus or dry skin  Left foot:      Skin integrity: No ulcer, skin breakdown, erythema, warmth, callus or dry skin  Neurological:      General: No focal deficit present  Mental Status: He is alert and oriented to person, place, and time  Psychiatric:         Mood and Affect: Mood normal          Behavior: Behavior normal          Thought Content: Thought content normal          Judgment: Judgment normal         Patient's shoes and socks removed  Right Foot/Ankle   Right Foot Inspection  Skin Exam: skin normal and skin intact  No dry skin, no warmth, no callus, no erythema, no maceration, no abnormal color, no pre-ulcer, no ulcer and no callus  Sensory   Monofilament testing: intact    Vascular  The right DP pulse is 2+  The right PT pulse is 2+  Left Foot/Ankle  Left Foot Inspection  Skin Exam: skin normal and skin intact  No dry skin, no warmth, no erythema, no maceration, normal color, no pre-ulcer, no ulcer and no callus  Sensory   Monofilament testing: intact    Vascular  The left DP pulse is 2+  The left PT pulse is 2+  Assign Risk Category  No deformity present  No loss of protective sensation  No weak pulses  Risk: 0        Labs:  Lab Results   Component Value Date    WBC 7 11 02/15/2023    HGB 14 0 02/15/2023    HCT 40 2 02/15/2023    MCV 86 02/15/2023     02/15/2023     Lab Results   Component Value Date     (L) 11/13/2015    K 3 5 02/15/2023    CL 96 02/15/2023    CO2 25 02/15/2023    ANIONGAP 11 11/13/2015    BUN 10 02/15/2023    CREATININE 0 89 02/15/2023    GLUCOSE 395 (H) 11/13/2015    GLUF 119 (H) 02/14/2023    CALCIUM 9 1 02/15/2023    CORRECTEDCA 8 9 02/14/2023    AST 23 02/15/2023    ALT 35 02/15/2023    ALKPHOS 37 02/15/2023    PROT 7 4 11/13/2015    BILITOT 0 30 11/13/2015    EGFR 101 02/15/2023                 BMI Counseling:  Body mass index is 41 63 kg/m²  The BMI is above normal  Nutrition recommendations include reducing portion sizes, decreasing overall calorie intake, 3-5 servings of fruits/vegetables daily, reducing fast food intake, consuming healthier snacks, decreasing soda and/or juice intake, moderation in carbohydrate intake, increasing intake of lean protein, reducing intake of saturated fat and trans fat and reducing intake of cholesterol

## 2023-03-01 NOTE — PATIENT INSTRUCTIONS
Heart Healthy Diet   AMBULATORY CARE:   A heart healthy diet  is an eating plan low in unhealthy fats and sodium (salt)  The plan is high in healthy fats and fiber  A heart healthy diet helps improve your cholesterol levels and lowers your risk for heart disease and stroke  A dietitian will teach you how to read and understand food labels  Heart healthy diet guidelines to follow:   • Choose foods that contain healthy fats:      ? Unsaturated fats  include monounsaturated and polyunsaturated fats  Unsaturated fat is found in foods such as soybean, canola, olive, corn, and safflower oils  It is also found in soft tub margarine that is made with liquid vegetable oil  ? Omega-3 fat  is found in certain fish, such as salmon, tuna, and trout, and in walnuts and flaxseed  Eat fish high in omega-3 fats at least 2 times a week  • Limit or do not have unhealthy fats:      ? Cholesterol  is found in animal foods, such as eggs and lobster, and in dairy products made from whole milk  Limit cholesterol to less than 200 mg each day  ? Saturated fat  is found in meats, such as boyer and hamburger  It is also found in chicken or turkey skin, whole milk, and butter  Limit saturated fat to less than 7% of your total daily calories  ? Trans fat  is found in packaged foods, such as potato chips and cookies  It is also in hard margarine, some fried foods, and shortening  Do not eat foods that contain trans fats  • Get 20 to 30 grams of fiber each day  Fruits, vegetables, whole-grain foods, and legumes (cooked beans) are good sources of fiber  • Limit sodium as directed  You may be told to limit sodium, such as to 2,000 mg or less each day  Choose low-sodium or no-salt-added foods  Add little or no salt to food you prepare  Use herbs and spices in place of salt         Include the following in your heart healthy plan:  Ask your dietitian or healthcare provider how many servings to have each day from the following food groups:  • Grains:      ? Whole-wheat breads, cereals, and pastas, and brown rice    ? Low-fat, low-sodium crackers and chips    • Vegetables:      ? Broccoli, green beans, green peas, and spinach    ? Collards, kale, and lima beans    ? Carrots, sweet potatoes, tomatoes, and peppers    ? Canned vegetables with no salt added    • Fruits:      ? Bananas, peaches, pears, and pineapple    ? Grapes, raisins, and dates    ? Oranges, tangerines, grapefruit, orange juice, and grapefruit juice    ? Apricots, mangoes, melons, and papaya    ? Raspberries and strawberries    ? Canned fruit with no added sugar    • Low-fat dairy:      ? Nonfat (skim) milk, 1% milk, and low-fat almond, cashew, or soy milks fortified with calcium    ? Low-fat cheese, regular or frozen yogurt, and cottage cheese    • Meats and proteins:      ? Lean cuts of beef and pork (loin, leg, round), skinless chicken and turkey    ? Legumes, soy products, egg whites, or nuts    Limit or do not include the following in your heart healthy plan:   • Foods and liquids that contain unhealthy fats and oils:      ? Whole or 2% milk, cream cheese, sour cream, or cheese    ? High-fat cuts of beef (T-bone steaks, ribs), chicken or turkey with skin, and organ meats such as liver    ? Butter, stick margarine, shortening, and cooking oils such as coconut or palm oil    • Foods and liquids high in sodium:      ? Packaged foods, such as frozen dinners, cookies, macaroni and cheese, and cereals with more than 300 mg of sodium per serving    ? Vegetables with added sodium, such as instant potatoes, vegetables with added sauces, or regular canned vegetables    ? Cured or smoked meats, such as hot dogs, boyer, and sausage    ? High-sodium ketchup, barbecue sauce, salad dressing, pickles, olives, soy sauce, or miso    • Foods and liquids high in sugar:      ? Candy, cake, cookies, pies, or doughnuts    ?  Soft drinks (soda), sports drinks, or sweetened tea    ? Canned or dry mixes for cakes, soups, sauces, or gravies    Other healthy heart guidelines:   • Do not smoke  Nicotine and other chemicals in cigarettes and cigars can cause lung and heart damage  Ask your healthcare provider for information if you currently smoke and need help to quit  E-cigarettes or smokeless tobacco still contain nicotine  Talk to your provider before you use these products  • Limit or do not drink alcohol as directed  Alcohol can damage your heart and raise your blood pressure  Your healthcare provider may give you specific daily and weekly limits  The general recommended limit is 1 drink a day for women 21 or older and for men 72 or older  Do not have more than 3 drinks within 24 hours or 7 within a week  The recommended limit is 2 drinks a day for men 24to 59years of age  Do not have more than 4 drinks within 24 hours or 14 within a week  A drink of alcohol is 12 ounces of beer, 5 ounces of wine, or 1½ ounces of liquor  • Maintain a healthy weight  Extra body weight makes your heart work harder  Ask your provider what a healthy weight is for you  He or she can help you create a safe weight loss plan, if needed  • Exercise regularly  Exercise can help you maintain a healthy weight and improve your blood pressure and cholesterol levels  Regular exercise can also decrease your risk for heart problems  Ask your provider about the best exercise plan for you  Do not start an exercise program without asking your provider  Follow up with your doctor or cardiologist as directed:  Write down your questions so you remember to ask them during your visits  © Copyright [employer] 2022 Information is for End User's use only and may not be sold, redistributed or otherwise used for commercial purposes  The above information is an  only  It is not intended as medical advice for individual conditions or treatments   Talk to your doctor, nurse or pharmacist before following any medical regimen to see if it is safe and effective for you

## 2023-03-03 LAB
LEFT EYE DIABETIC RETINOPATHY: NORMAL
LEFT EYE IMAGE QUALITY: NORMAL
LEFT EYE MACULAR EDEMA: NORMAL
LEFT EYE OTHER RETINOPATHY: NORMAL
RIGHT EYE DIABETIC RETINOPATHY: NORMAL
RIGHT EYE IMAGE QUALITY: NORMAL
RIGHT EYE MACULAR EDEMA: NORMAL
RIGHT EYE OTHER RETINOPATHY: NORMAL
SEVERITY (EYE EXAM): NORMAL

## 2023-04-20 ENCOUNTER — APPOINTMENT (EMERGENCY)
Dept: CT IMAGING | Facility: HOSPITAL | Age: 48
End: 2023-04-20

## 2023-04-20 ENCOUNTER — HOSPITAL ENCOUNTER (EMERGENCY)
Facility: HOSPITAL | Age: 48
Discharge: HOME | End: 2023-04-20
Attending: EMERGENCY MEDICINE | Admitting: EMERGENCY MEDICINE

## 2023-04-20 ENCOUNTER — APPOINTMENT (EMERGENCY)
Dept: RADIOLOGY | Facility: HOSPITAL | Age: 48
End: 2023-04-20

## 2023-04-20 VITALS
SYSTOLIC BLOOD PRESSURE: 166 MMHG | OXYGEN SATURATION: 99 % | HEART RATE: 79 BPM | RESPIRATION RATE: 16 BRPM | DIASTOLIC BLOOD PRESSURE: 93 MMHG | TEMPERATURE: 97.9 F

## 2023-04-20 DIAGNOSIS — R51.9 HEADACHE: Primary | ICD-10-CM

## 2023-04-20 DIAGNOSIS — M54.9 BACK PAIN: ICD-10-CM

## 2023-04-20 RX ORDER — CYCLOBENZAPRINE HCL 10 MG
10 TABLET ORAL 2 TIMES DAILY PRN
Qty: 20 TABLET | Refills: 0 | Status: SHIPPED | OUTPATIENT
Start: 2023-04-20
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
## 2023-06-08 ENCOUNTER — APPOINTMENT (OUTPATIENT)
Dept: RADIOLOGY | Facility: MEDICAL CENTER | Age: 48
End: 2023-06-08
Payer: COMMERCIAL

## 2023-06-08 ENCOUNTER — OFFICE VISIT (OUTPATIENT)
Dept: URGENT CARE | Facility: MEDICAL CENTER | Age: 48
End: 2023-06-08
Payer: COMMERCIAL

## 2023-06-08 VITALS
OXYGEN SATURATION: 98 % | HEIGHT: 68 IN | TEMPERATURE: 96.4 F | BODY MASS INDEX: 40.16 KG/M2 | WEIGHT: 265 LBS | RESPIRATION RATE: 20 BRPM | HEART RATE: 80 BPM

## 2023-06-08 DIAGNOSIS — M79.89 SWELLING OF RIGHT FOOT: ICD-10-CM

## 2023-06-08 DIAGNOSIS — M79.89 SWELLING OF RIGHT FOOT: Primary | ICD-10-CM

## 2023-06-08 PROCEDURE — 73630 X-RAY EXAM OF FOOT: CPT

## 2023-06-08 PROCEDURE — 99213 OFFICE O/P EST LOW 20 MIN: CPT
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
## 2023-06-08 NOTE — PATIENT INSTRUCTIONS
You may take over the counter Tylenol (Acetaminophen) and/or Motrin (Ibuprofen) as needed, as directed on packaging

## 2023-07-23 ENCOUNTER — OFFICE VISIT (OUTPATIENT)
Dept: URGENT CARE | Facility: MEDICAL CENTER | Age: 48
End: 2023-07-23
Payer: COMMERCIAL

## 2023-07-23 ENCOUNTER — APPOINTMENT (OUTPATIENT)
Dept: RADIOLOGY | Facility: MEDICAL CENTER | Age: 48
End: 2023-07-23
Payer: COMMERCIAL

## 2023-07-23 VITALS
SYSTOLIC BLOOD PRESSURE: 112 MMHG | BODY MASS INDEX: 39.77 KG/M2 | HEART RATE: 79 BPM | RESPIRATION RATE: 20 BRPM | WEIGHT: 262.4 LBS | HEIGHT: 68 IN | DIASTOLIC BLOOD PRESSURE: 70 MMHG | TEMPERATURE: 98.2 F | OXYGEN SATURATION: 98 %

## 2023-07-23 DIAGNOSIS — M54.2 NECK PAIN: ICD-10-CM

## 2023-07-23 DIAGNOSIS — S16.1XXA STRAIN OF NECK MUSCLE, INITIAL ENCOUNTER: Primary | ICD-10-CM

## 2023-07-23 PROCEDURE — 72040 X-RAY EXAM NECK SPINE 2-3 VW: CPT

## 2023-07-23 PROCEDURE — 99212 OFFICE O/P EST SF 10 MIN: CPT | Performed by: PHYSICIAN ASSISTANT

## 2023-07-23 RX ORDER — BACLOFEN 20 MG/1
20 TABLET ORAL 3 TIMES DAILY
Qty: 20 TABLET | Refills: 0 | Status: SHIPPED | OUTPATIENT
Start: 2023-07-23

## 2023-07-23 RX ORDER — METHYLPREDNISOLONE 4 MG/1
TABLET ORAL
Qty: 1 EACH | Refills: 0 | Status: SHIPPED | OUTPATIENT
Start: 2023-07-23

## 2023-07-23 NOTE — PROGRESS NOTES
[facility]        NAME: Giovanna Marlow is a 52 y.o. male  : 1975    MRN: [id number]  DATE: 2023  TIME: 5:45 PM    Assessment and Plan   Strain of neck muscle, initial encounter [S16. 1XXA]  1. Strain of neck muscle, initial encounter  methylPREDNISolone 4 MG tablet therapy pack    baclofen 20 mg tablet      2. Neck pain  XR spine cervical 2 or 3 vw injury            Patient Instructions       Follow up with PCP in 3-5 days. Proceed to  ER if symptoms worsen. Chief Complaint     Chief Complaint   Patient presents with   • Neck Pain     Pt was walking his dog when she pulled a different way than the patient. Pt heard a crack. He is having limited motion in his neck. Symptoms started on Wednesday. Tylenol and Advil, heat and ice for symptoms. History of Present Illness       Patient present with a 5 day hx of neck pain which started while walking his dog. The dog pulled him and he heard a crack in his neck and started with pain. Previous hx of spinal arthritis and cervical disk issues. Occasional radiation of pain into hands, no numbenss or tingling. Review of Systems   Review of Systems   Constitutional: Negative for fever. Musculoskeletal: Positive for neck pain. Skin: Negative for rash. Neurological: Negative for weakness and numbness.          Current Medications       Current Outpatient Medications:   •  aspirin 81 mg chewable tablet, Chew 81 mg daily, Disp: , Rfl:   •  atorvastatin (LIPITOR) 10 mg tablet, Take 1 tablet (10 mg total) by mouth daily, Disp: 30 tablet, Rfl: 5  •  baclofen 20 mg tablet, Take 1 tablet (20 mg total) by mouth 3 (three) times a day, Disp: 20 tablet, Rfl: 0  •  divalproex sodium (DEPAKOTE) 500 mg EC tablet, Take 500 mg by mouth 2 (two) times a day., Disp: , Rfl:   •  lisinopril (ZESTRIL) 10 mg tablet, Take 1 tablet (10 mg total) by mouth daily, Disp: 30 tablet, Rfl: 5  •  metFORMIN (GLUCOPHAGE) 1000 MG tablet, Take 1 tablet (1,000 mg total) by mouth 2 (two) times a day, Disp: 60 tablet, Rfl: 5  •  methylPREDNISolone 4 MG tablet therapy pack, Use as directed on package, Disp: 1 each, Rfl: 0  •  risperiDONE (RisperDAL) 2 mg tablet, Take 1 tablet (2 mg total) by mouth 2 (two) times a day, Disp: 30 tablet, Rfl: 0  •  Blood Glucose Monitoring Suppl (ONE TOUCH ULTRA 2) w/Device KIT, by Does not apply route 2 (two) times a day (Patient not taking: Reported on 7/23/2023), Disp: 1 each, Rfl: 0  •  budesonide (ENTOCORT EC) 3 MG capsule, Take 3 capsules (9 mg total) by mouth daily (Patient not taking: Reported on 7/23/2023), Disp: 90 capsule, Rfl: 5  •  cyclobenzaprine (FLEXERIL) 10 mg tablet, Take 1 tablet (10 mg total) by mouth 2 (two) times a day as needed for muscle spasms (Patient not taking: Reported on 6/8/2023), Disp: 20 tablet, Rfl: 0    Current Allergies     Allergies as of 07/23/2023 - Reviewed 07/23/2023   Allergen Reaction Noted   • Coconut oil - food allergy Throat Swelling 06/17/2021   • Epinephrine Other (See Comments) 07/28/2016   • Iodine - food allergy Other (See Comments)    • Other Other (See Comments) 07/28/2016   • Shellfish-derived products - food allergy GI Intolerance 05/09/2012            The following portions of the patient's history were reviewed and updated as appropriate: allergies, current medications, past family history, past medical history, past social history, past surgical history and problem list.     Past Medical History:   Diagnosis Date   • Allergic rhinitis     resolved 6/20/17   • Asthma     resolved 6/20/17   • Bipolar 1 disorder ([address])     resolved 6/20/17   • Chronic kidney disease    • Diabetes mellitus ([address])    • Hematuria     resolved 6/20/17   • Hyperlipidemia    • Leukocytosis     resolved 6/20/17   • Diana-Hussein tear    • Pulmonary emboli ([address])     resolved 6/20/17   • Seizures ([address])    • Sleep apnea     resolved 6/20/17       Past Surgical History:   Procedure Laterality Date   • ANGIOPLASTY      previous stent placement   • CARDIAC SURGERY     • NASAL SEPTUM SURGERY         Family History   Problem Relation Age of Onset   • Cancer Mother    • Diabetes Mother    • Hypertension Mother    • Asthma Father    • Cancer Paternal Grandfather    • Colon cancer Family          Medications have been verified. Objective   /70   Pulse 79   Temp 98.2 °F (36.8 °C)   Resp 20   Ht 5' 8" (1.727 m)   Wt 119 kg (262 lb 6.4 oz)   SpO2 98%   BMI 39.90 kg/m²   No LMP for male patient. Physical Exam     Physical Exam  Vitals and nursing note reviewed. Constitutional:       Appearance: Normal appearance. HENT:      Head: Normocephalic and atraumatic. Cardiovascular:      Rate and Rhythm: Normal rate and regular rhythm. Pulmonary:      Effort: Pulmonary effort is normal.   Musculoskeletal:      Comments: Trapezius muscle spasm B/L, mild lower cervical vertebral tenderness. Diminished ROM of neck secondary to pain. Skin:     General: Skin is warm. Neurological:      Mental Status: He is alert.      Cervical spine xr no acute osseous abnormality, degenerative changes

## 2023-07-23 NOTE — PATIENT INSTRUCTIONS
Start Medrol Dose Pack and Baclofen as prescribed  Heat followed by ice  If symptoms fail to improve follow up with PCP

## 2023-08-10 NOTE — PROGRESS NOTES
Chief Complaint   Patient presents with   â[facility]¢ [facility]  80-year-old female is seen for nursing home discharge visit at [facility] on 6/16/2022. History is obtained from prior chart records, discussion with facility nursing staff as well as interview and exam of the patient. Prior to admission at [facility] from 05/06-5/19 due to a right lower extremity arterial embolus with critical limb ischemia. She was treated with thromboembolectomy and thrombolytics therapy. She had been on warfarin prior to this and she had a subtherapeutic INR which was felt to be contributing to her arterial embolus. She was discharged on Xarelto. She did require a fasciotomy to the medial and lateral aspects of her right lower extremity. She has been going to the wound clinic on a weekly basis. She has good granulation tissue of the lateral wound but medially the wound is still leg Ng somewhat behind. She does note that the pain is up to 8-9/10 in intensity but better at other times. It seems to be worse when she is weight-bearing or when she has a dressing change. She will be discharging to assisted living. While she is there, she will have home care with PT and OT for further gait training and transfers. She does use a 4 wheeled walker and has been using a wheelchair at the facility. She will need continued wound care to the right lower extremity as well as therapy for further gait training, transfers and mobility. She does have a history of hypertension. Her blood pressures have been a bit on the low side. She does remain on low-dose metoprolol that she uses for her hypertension as well as for rate control of her atrial fibrillation. She has not had any chest pain, shortness of breath or palpitations. She does have chronic diastolic CHF and has been on diuretic therapy with a combination of furosemide and spironolactone.   She does have a PT Evaluation  and PT Discharge    Today's date: 2020  Patient name: Neal Davis  : 1975  MRN: [id number]  Referring provider: Yessica López DO  Dx:   Encounter Diagnosis     ICD-10-CM    1  Chronic left-sided low back pain with left-sided sciatica M54 42 Ambulatory referral to Physical Therapy    G89 [id number]      Addendum 2020: The patient did not attend 3 scheduled appointments after his initial evaluation  He will be following up with pain management and has been discharged from our care  Assessment  Assessment details:   CURRENT FUNCTIONAL STATUS    Sleep tolerance <1 hour  Sitting tolerance 5 minutes  Walking tolerance < 1 block  Ability to bend forward: Poor       SHORT TERM GOALS (2 WEEKS)    Increase lumbar spine AROM 10% in all planes  Decrease pain to 5-7/10  Sleep tolerance 1-2 hours  Sitting tolerance 15 minutes  Walking tolerance 1-2 blocks  Ability to bend forward: Fair      LONG TERM GOALS (DISCHARGE)    Lumbar Spine AROM: WFL in all planes  Decrease pain to 1-4/10  Sleep tolerance 3-4 hours  Sitting tolerance 30 minutes  Walking tolerance 3-4 blocks  Ability to bend forward: Fair/Good    Understanding of Dx/Px/POC: good   Prognosis: poor  Prognosis details: The patient's left groin and knee pain did not centralize during the initial evaluation  He had a poor tolerance for basic ROM exercises due to pain  Goals  See assessment details above  Plan  Plan details: Neal Davis is a 40 y o  male presenting to PT with pain, decreased range of motion, decreased strength, and decreased tolerance to activity  This patient would benefit from skilled PT services to address these issues and to maximize function  A home exercise program was provided and all questions were answered   Thank you for the referral     Patient would benefit from: skilled physical therapy  Planned modality interventions: unattended electrical stimulation, history of aortic stenosis and has had a previous TAVR. She is on Xarelto related to her paroxysmal atrial fibrillation. Her blood pressures have been in the upper 90 systolic to 213/89. She does have a history of diabetes mellitus type 2. She has been on metformin 500 mg twice a day. Her blood sugars have been well controlled and mostly in the low 100 range in definitely less than 150. She will need continued monitoring of her blood sugars. She did develop acute blood loss anemia. Her hemoglobin most recently on 06/13 was 9.6. She was in the 9-10 range prior. She will need to have this monitored periodically. Patient Active Problem List   Diagnosis   â¢ Hypertension complicating diabetes (CMS/[facility])   â¢ Atrial fibrillation - paroxysmal    â¢ Hyperlipidemia   â¢ Other iatrogenic hypothyroidism   â¢ Reflux esophagitis   â¢ Type 2 diabetes mellitus with stage 3a chronic kidney disease, without long-term current use of insulin (CMS/[facility])   â¢ Asthma   â¢ Chronic anticoagulation   â¢ Generalized osteoarthritis   â¢ Myalgia   â¢ Sleep disturbances   â¢ Bilateral lower extremity edema   â¢ Lower extremity venous stasis   â¢ Ulcer of lower extremity with fat layer exposed (CMS/[facility])   â¢ Severe aortic stenosis   â¢ Osteopenia   â¢ Valvular heart disease   â¢ S/P TAVR (transcatheter aortic valve replacement)   â¢ PVD (peripheral vascular disease) (CMS/[facility])   â¢ Basal cell carcinoma (BCC) of right temple region   â¢ Hyperlipidemia associated with type 2 diabetes mellitus (CMS/[facility])   â¢ Secondary hypercoagulable state (CMS/[facility])   â¢ Long term (current) use of anticoagulants - warfarin    â¢ Pulmonary emphysema, unspecified emphysema type (CMS/[facility])       I have reviewed the patient's medications and allergies, with the most recent medication list reviewed on her [facility] chart. REVIEW OF SYSTEMS:  All other Review of Systems  negative except as documented in the history of present illness.     Physical Examination:  Pleasant thermotherapy: hydrocollator packs and cryotherapy  Planned therapy interventions: manual therapy, neuromuscular re-education, therapeutic exercise and therapeutic activities  Frequency: 2x week  Duration in weeks: 4        Subjective Evaluation    History of Present Illness  Mechanism of injury: CC: Left sided low back pain radiating into the left groin and medial knee, weakness of left leg, numbness on the inside of the left knee  HPI: The patient's low back problem began on 2019 when he passed out in the bathroom, hitting the left side of his low back on the toilet  He had x-rays taken and states that it showed his spine has lost the normal curve  He had an injection without relief  Medication does not help  He is currently on disability  Pain  Current pain ratin  At best pain ratin  At worst pain ratin  Progression: no change    Patient Goals  Patient goals for therapy: decreased pain, increased motion and increased strength          Objective     Postural Observations    Additional Postural Observation Details  Patient ambulates with very little left knee flexion  He unweights the left leg when standing still  There is no lateral shift or kyphotic deformity  General Comments:      Lumbar Comments  CURRENT OBJECTIVE MEASUREMENTS    Lumbar Spine AROM: F=25 %, EXT=25 %, R SB=25 %, L SB=10 %, with all movements being painful  Lower Extremity Strength: Grossly 5/5 t/o bilaterally  Lower Extremity Reflexes: Absent bilaterally  Lower Extremity Sensation: decreased to light touch on the medial left knee                    Precautions: None    Daily Treatment Diary     Manual  2/5        STM         P/A Mob         Flex/Rot Mob         Hamstring Stretch         Piriformis Stretch                  Exercise Diary          Prone lying         Elbow Props         Press Ups         SBB         SG         LTR         SKTC         Double leg bridging 10x TID HEP        Flexion Sitting 10x TID HEP Flexion Standing         Quadruped Arm and Leg Raises         NuStep:   S , A         BACK EXT:   F , P , C         PYR AB:   S , P         Rotary Torso: S , P , C         Hoist Row: S          Lat Pulldown: S         SA Pulldown         Oblique Press         Multi-Hip:  F , P         FLEX/EXT         ABD/ADD         Prone Planks         Side Planks         Body Mechanics         Centralization education RK        Disc mechanics         Lumbar Roll                  Modalities          HP/IFC         Traction elderly white female in no acute distress. /70. Temp 97.3Â°. Pulse 87. 91% on a room air. Weight 179. HEENT-sclera anicteric. Her lips appear moist.  Neck-no thyroid enlargement or carotid bruits. No adenopathy. Lungs are clear bilaterally without crackles or wheezes. Heart is slightly irregular 2/6 systolic murmur. Abdomen is obese but not distended. Bowel sounds are normal.  No masses or hepatosplenomegaly. She has tubigrips in place on the left lower extremity with no pitting edema noted. She does have an Ace wrap on her right lower extremity headache and C she has bandages medially and laterally. I did not remove the Ace wrap. She has no calf pain or tenderness. No palpable cords. She is alert and oriented x3. She appears euthymic. Veronica Nelson was seen today for nursing home. Diagnoses and all orders for this visit:    Arterial embolism of right leg (CMS/[facility])    History of fasciotomy    Hypertension complicating diabetes (CMS/[facility])    Type 2 diabetes mellitus with stage 3a chronic kidney disease, without long-term current use of insulin (CMS/[facility])    Chronic diastolic CHF (congestive heart failure) (CMS/[facility])    Paroxysmal atrial fibrillation (CMS/[facility])    S/P TAVR (transcatheter aortic valve replacement)    Chronic anticoagulation    Anemia, unspecified type    Gait instability      Plan:  Regarding her right lower extremity arterial embolus with critical limb ischemia, she did receive thrombolytics therapy with a thromboembolectomy. She is currently on Xarelto for long-term anticoagulation. She did have a fasciotomy of the right lower extremity medially and laterally. She did show me pictures of the wound from the wound clinic and she has good granulation tissue on the lateral aspect of the wound but medially, she does have some more hyperkeratotic tissues but this has been improving. She will continue her follow-up with the wound clinic.     Regarding her hypertension, her blood pressure is variable. She is on low-dose metoprolol. She is asymptomatic when her blood pressure is in the upper 90 systolic range. Would recommend continued monitoring. Regarding her diabetes type 2, her blood sugars have been well controlled. She will continue to have her blood sugars monitored and continue her metformin 5 mg twice a day. She does have a history of chronic diastolic CHF. Will continue her diuretic therapy. She is due to follow-up with cardiology in the next 1-2 weeks. She has had a history of aortic stenosis with previous TAVR. She will follow-up with cardiology as above. Regarding her paroxysmal atrial fibrillation, she does remain on Xarelto anticoagulation. She does appear to be in atrial fibrillation at present. Will continue to monitor and she will see Cardiology as scheduled. She does have a history of anemia. Most recent hemoglobin of 9.6. Will continue to monitor. She will need an outpatient CBC. She does have gait instability which is acute on chronic related to her recent surgical procedure. She will need continued therapies at her assisted living facility. She does have a 4 wheeled walker and does not apparently need additional equipment. I spent 35 minutes with the patient, more than 50% of that time was counseling the patient about medical treatment options as well as coordinating care regarding her upcoming discharge to assisted living.      Khadra Carney MD   Electronically signed neuromuscular/musculoskeletal

## 2023-09-27 ENCOUNTER — VBI (OUTPATIENT)
Dept: ADMINISTRATIVE | Facility: OTHER | Age: 48
End: 2023-09-27

## 2023-12-28 DIAGNOSIS — E11.9 TYPE 2 DIABETES MELLITUS WITHOUT COMPLICATION, WITHOUT LONG-TERM CURRENT USE OF INSULIN (HCC): ICD-10-CM

## 2023-12-28 RX ORDER — LISINOPRIL 10 MG/1
10 TABLET ORAL DAILY
Qty: 30 TABLET | Refills: 0 | Status: SHIPPED | OUTPATIENT
Start: 2023-12-28

## 2024-01-22 DIAGNOSIS — E11.9 TYPE 2 DIABETES MELLITUS WITHOUT COMPLICATION, WITHOUT LONG-TERM CURRENT USE OF INSULIN (HCC): ICD-10-CM

## 2024-01-22 RX ORDER — LISINOPRIL 10 MG/1
10 TABLET ORAL DAILY
Qty: 30 TABLET | Refills: 1 | Status: SHIPPED | OUTPATIENT
Start: 2024-01-22

## 2024-01-22 RX ORDER — ATORVASTATIN CALCIUM 10 MG/1
10 TABLET, FILM COATED ORAL DAILY
Qty: 30 TABLET | Refills: 5 | Status: SHIPPED | OUTPATIENT
Start: 2024-01-22

## 2024-02-14 ENCOUNTER — OFFICE VISIT (OUTPATIENT)
Dept: FAMILY MEDICINE CLINIC | Facility: CLINIC | Age: 49
End: 2024-02-14
Payer: COMMERCIAL

## 2024-02-14 ENCOUNTER — APPOINTMENT (OUTPATIENT)
Dept: LAB | Facility: MEDICAL CENTER | Age: 49
End: 2024-02-14
Payer: COMMERCIAL

## 2024-02-14 VITALS
HEIGHT: 68 IN | BODY MASS INDEX: 39.65 KG/M2 | TEMPERATURE: 95.8 F | HEART RATE: 86 BPM | OXYGEN SATURATION: 94 % | WEIGHT: 261.6 LBS | SYSTOLIC BLOOD PRESSURE: 118 MMHG | DIASTOLIC BLOOD PRESSURE: 80 MMHG

## 2024-02-14 DIAGNOSIS — J31.2 CHRONIC SORE THROAT: ICD-10-CM

## 2024-02-14 DIAGNOSIS — E78.2 MIXED DYSLIPIDEMIA: ICD-10-CM

## 2024-02-14 DIAGNOSIS — E11.9 TYPE 2 DIABETES MELLITUS WITHOUT COMPLICATION, WITHOUT LONG-TERM CURRENT USE OF INSULIN (HCC): ICD-10-CM

## 2024-02-14 DIAGNOSIS — Z00.00 ANNUAL PHYSICAL EXAM: Primary | ICD-10-CM

## 2024-02-14 LAB
ALBUMIN SERPL BCP-MCNC: 4.4 G/DL (ref 3.5–5)
ALP SERPL-CCNC: 44 U/L (ref 34–104)
ALT SERPL W P-5'-P-CCNC: 44 U/L (ref 7–52)
ANION GAP SERPL CALCULATED.3IONS-SCNC: 13 MMOL/L
AST SERPL W P-5'-P-CCNC: 26 U/L (ref 13–39)
BASOPHILS # BLD AUTO: 0.06 THOUSANDS/ÂΜL (ref 0–0.1)
BASOPHILS NFR BLD AUTO: 1 % (ref 0–1)
BILIRUB SERPL-MCNC: 0.74 MG/DL (ref 0.2–1)
BUN SERPL-MCNC: 12 MG/DL (ref 5–25)
CALCIUM SERPL-MCNC: 9.6 MG/DL (ref 8.4–10.2)
CHLORIDE SERPL-SCNC: 96 MMOL/L (ref 96–108)
CHOLEST SERPL-MCNC: 142 MG/DL
CO2 SERPL-SCNC: 25 MMOL/L (ref 21–32)
CREAT SERPL-MCNC: 0.82 MG/DL (ref 0.6–1.3)
CREAT UR-MCNC: 355.2 MG/DL
EOSINOPHIL # BLD AUTO: 0.24 THOUSAND/ÂΜL (ref 0–0.61)
EOSINOPHIL NFR BLD AUTO: 3 % (ref 0–6)
ERYTHROCYTE [DISTWIDTH] IN BLOOD BY AUTOMATED COUNT: 13.4 % (ref 11.6–15.1)
GFR SERPL CREATININE-BSD FRML MDRD: 104 ML/MIN/1.73SQ M
GLUCOSE P FAST SERPL-MCNC: 179 MG/DL (ref 65–99)
HCT VFR BLD AUTO: 43.2 % (ref 36.5–49.3)
HDLC SERPL-MCNC: 36 MG/DL
HGB BLD-MCNC: 14.2 G/DL (ref 12–17)
IMM GRANULOCYTES # BLD AUTO: 0.03 THOUSAND/UL (ref 0–0.2)
IMM GRANULOCYTES NFR BLD AUTO: 0 % (ref 0–2)
LDLC SERPL CALC-MCNC: 58 MG/DL (ref 0–100)
LYMPHOCYTES # BLD AUTO: 2.89 THOUSANDS/ÂΜL (ref 0.6–4.47)
LYMPHOCYTES NFR BLD AUTO: 30 % (ref 14–44)
MCH RBC QN AUTO: 28 PG (ref 26.8–34.3)
MCHC RBC AUTO-ENTMCNC: 32.9 G/DL (ref 31.4–37.4)
MCV RBC AUTO: 85 FL (ref 82–98)
MICROALBUMIN UR-MCNC: 29.5 MG/L
MICROALBUMIN/CREAT 24H UR: 8 MG/G CREATININE (ref 0–30)
MONOCYTES # BLD AUTO: 0.51 THOUSAND/ÂΜL (ref 0.17–1.22)
MONOCYTES NFR BLD AUTO: 5 % (ref 4–12)
NEUTROPHILS # BLD AUTO: 5.83 THOUSANDS/ÂΜL (ref 1.85–7.62)
NEUTS SEG NFR BLD AUTO: 61 % (ref 43–75)
NRBC BLD AUTO-RTO: 0 /100 WBCS
PLATELET # BLD AUTO: 248 THOUSANDS/UL (ref 149–390)
PMV BLD AUTO: 9.5 FL (ref 8.9–12.7)
POTASSIUM SERPL-SCNC: 4.2 MMOL/L (ref 3.5–5.3)
PROT SERPL-MCNC: 7.9 G/DL (ref 6.4–8.4)
RBC # BLD AUTO: 5.07 MILLION/UL (ref 3.88–5.62)
SL AMB POCT HEMOGLOBIN AIC: 7.6 (ref ?–6.5)
SODIUM SERPL-SCNC: 134 MMOL/L (ref 135–147)
TRIGL SERPL-MCNC: 242 MG/DL
TSH SERPL DL<=0.05 MIU/L-ACNC: 2.67 UIU/ML (ref 0.45–4.5)
WBC # BLD AUTO: 9.56 THOUSAND/UL (ref 4.31–10.16)

## 2024-02-14 PROCEDURE — 82043 UR ALBUMIN QUANTITATIVE: CPT | Performed by: FAMILY MEDICINE

## 2024-02-14 PROCEDURE — 82570 ASSAY OF URINE CREATININE: CPT | Performed by: FAMILY MEDICINE

## 2024-02-14 PROCEDURE — 85025 COMPLETE CBC W/AUTO DIFF WBC: CPT | Performed by: FAMILY MEDICINE

## 2024-02-14 PROCEDURE — 99396 PREV VISIT EST AGE 40-64: CPT | Performed by: FAMILY MEDICINE

## 2024-02-14 PROCEDURE — 36415 COLL VENOUS BLD VENIPUNCTURE: CPT | Performed by: FAMILY MEDICINE

## 2024-02-14 PROCEDURE — 84443 ASSAY THYROID STIM HORMONE: CPT | Performed by: FAMILY MEDICINE

## 2024-02-14 PROCEDURE — 80061 LIPID PANEL: CPT | Performed by: FAMILY MEDICINE

## 2024-02-14 PROCEDURE — 83036 HEMOGLOBIN GLYCOSYLATED A1C: CPT | Performed by: FAMILY MEDICINE

## 2024-02-14 PROCEDURE — 80053 COMPREHEN METABOLIC PANEL: CPT | Performed by: FAMILY MEDICINE

## 2024-02-14 NOTE — PROGRESS NOTES
ADULT ANNUAL PHYSICAL  [facility] PRIMARY CARE    NAME: Quinn Ramírez  AGE: 48 y.o. SEX: male  : 1975     DATE: 2024     Assessment and Plan:   Hemoglobin A1c better at 7.6.  He will get his other lab work done today.  Referred to ENT for his chronic sore throat and history of chewing tobacco.  Follow-up here in 6 months or as needed  Problem List Items Addressed This Visit        Endocrine    Type 2 diabetes mellitus without complication, without long-term current use of insulin (HCC)    Relevant Orders    POCT hemoglobin A1c (Completed)    CBC and differential    Comprehensive metabolic panel    Lipid Panel with Direct LDL reflex    TSH, 3rd generation with Free T4 reflex    Albumin / creatinine urine ratio       Other    Mixed dyslipidemia    Relevant Orders    Comprehensive metabolic panel    Lipid Panel with Direct LDL reflex   Other Visit Diagnoses     Annual physical exam    -  Primary    Chronic sore throat        Relevant Orders    Ambulatory Referral to Otolaryngology          Immunizations and preventive care screenings were discussed with patient today. Appropriate education was printed on patient's after visit summary.      Counseling:  Dental Health: discussed importance of regular tooth brushing, flossing, and dental visits.  Exercise: the importance of regular exercise/physical activity was discussed. Recommend exercise 3-5 times per week for at least 30 minutes.          Return in 6 months (on 2024) for Recheck.     Chief Complaint:     Chief Complaint   Patient presents with   • Well Check   • Sore Throat     Pt c/o sore throat and something white on tonsil      History of Present Illness:     Adult Annual Physical   Patient here for a comprehensive physical exam. The patient reports problems - Sore throat .    Diet and Physical Activity  Diet/Nutrition: well balanced diet.   Exercise: no formal exercise.      Depression Screening  PHQ-2/9  Depression Screening    Little interest or pleasure in doing things: 0 - not at all  Feeling down, depressed, or hopeless: 0 - not at all  PHQ-2 Score: 0  PHQ-2 Interpretation: Negative depression screen       General Health  Sleep: sleeps well.   Hearing: normal - bilateral.  Vision: wears glasses.   Dental: no dental visits for >1 year.        Health  Symptoms include: none       Review of Systems:     Review of Systems   Constitutional: Negative.    HENT:  Positive for sore throat.    Respiratory: Negative.     Cardiovascular: Negative.    Gastrointestinal: Negative.    Genitourinary: Negative.       Past Medical History:     Past Medical History:   Diagnosis Date   • Allergic rhinitis     resolved 6/20/17   • Asthma     resolved 6/20/17   • Bipolar 1 disorder (HCC)     resolved 6/20/17   • Chronic kidney disease    • Diabetes mellitus (HCC)    • Hematuria     resolved 6/20/17   • Hyperlipidemia    • Leukocytosis     resolved 6/20/17   • Diana-Hussein tear    • Pulmonary emboli (HCC)     resolved 6/20/17   • Seizures (HCC)    • Sleep apnea     resolved 6/20/17      Past Surgical History:     Past Surgical History:   Procedure Laterality Date   • ANGIOPLASTY      previous stent placement   • CARDIAC SURGERY     • NASAL SEPTUM SURGERY        Family History:     Family History   Problem Relation Age of Onset   • Cancer Mother    • Diabetes Mother    • Hypertension Mother    • Asthma Father    • Cancer Paternal Grandfather    • Colon cancer Family       Social History:     Social History     Socioeconomic History   • Marital status: /Civil Union     Spouse name: None   • Number of children: None   • Years of education: None   • Highest education level: None   Occupational History   • None   Tobacco Use   • Smoking status: Never     Passive exposure: Never   • Smokeless tobacco: Former     Types: Chew   Vaping Use   • Vaping status: Never Used   Substance and Sexual Activity   • Alcohol use: Never      "Alcohol/week: 0.0 standard drinks of alcohol     Comment: occasional as per [employer]   • Drug use: No   • Sexual activity: Not Currently     Partners: Female   Other Topics Concern   • None   Social History Narrative   • None     Social Determinants of Health     Financial Resource Strain: Not on file   Food Insecurity: No Food Insecurity (2/14/2023)    Hunger Vital Sign    • Worried About Running Out of Food in the Last Year: Never true    • Ran Out of Food in the Last Year: Never true   Transportation Needs: Not on file   Physical Activity: Not on file   Stress: Not on file   Social Connections: Not on file   Intimate Partner Violence: Not on file   Housing Stability: Low Risk  (2/14/2023)    Housing Stability Vital Sign    • Unable to Pay for Housing in the Last Year: No    • Number of Places Lived in the Last Year: 1    • Unstable Housing in the Last Year: No      Current Medications:     Current Outpatient Medications   Medication Sig Dispense Refill   • aspirin 81 mg chewable tablet Chew 81 mg daily     • atorvastatin (LIPITOR) 10 mg tablet Take 1 tablet (10 mg total) by mouth daily 30 tablet 5   • divalproex sodium (DEPAKOTE) 500 mg EC tablet Take 500 mg by mouth 2 (two) times a day.     • lisinopril (ZESTRIL) 10 mg tablet Take 1 tablet (10 mg total) by mouth daily 30 tablet 1   • metFORMIN (GLUCOPHAGE) 1000 MG tablet Take 1 tablet (1,000 mg total) by mouth 2 (two) times a day 60 tablet 1   • risperiDONE (RisperDAL) 2 mg tablet Take 1 tablet (2 mg total) by mouth 2 (two) times a day 30 tablet 0     No current facility-administered medications for this visit.      Allergies:     Allergies   Allergen Reactions   • Coconut Oil - Food Allergy Throat Swelling   • Epinephrine Other (See Comments)     \"pass out\"   • Iodine - Food Allergy Other (See Comments)     shellfish   • Other Other (See Comments)     NAUSEA VOMITING TO SHELLFISH   • Shellfish-Derived Products - Food Allergy GI Intolerance      Physical Exam: " "    /80   Pulse 86   Temp (!) 95.8 °F (35.4 °C)   Ht 5' 8\" (1.727 m)   Wt 119 kg (261 lb 9.6 oz)   SpO2 94%   BMI 39.78 kg/m²     Physical Exam  Vitals reviewed.   Constitutional:       General: He is not in acute distress.     Appearance: Normal appearance. He is well-developed. He is not diaphoretic.   HENT:      Head: Normocephalic and atraumatic.      Mouth/Throat:      Mouth: Mucous membranes are moist.      Pharynx: No oropharyngeal exudate or posterior oropharyngeal erythema.   Eyes:      Conjunctiva/sclera: Conjunctivae normal.   Cardiovascular:      Rate and Rhythm: Normal rate and regular rhythm.      Heart sounds: Normal heart sounds. No murmur heard.     No friction rub. No gallop.   Pulmonary:      Effort: Pulmonary effort is normal. No respiratory distress.      Breath sounds: Normal breath sounds. No wheezing, rhonchi or rales.   Musculoskeletal:      Right lower leg: No edema.      Left lower leg: No edema.   Neurological:      General: No focal deficit present.      Mental Status: He is alert and oriented to person, place, and time.   Psychiatric:         Mood and Affect: Mood normal.         Behavior: Behavior normal.         Thought Content: Thought content normal.         Judgment: Judgment normal.          Benito Lamb, [facility] PRIMARY [facility]    "

## 2024-02-19 ENCOUNTER — TELEPHONE (OUTPATIENT)
Dept: FAMILY MEDICINE CLINIC | Facility: CLINIC | Age: 49
End: 2024-02-19

## 2024-02-19 DIAGNOSIS — J31.2 CHRONIC SORE THROAT: Primary | ICD-10-CM

## 2024-02-19 NOTE — TELEPHONE ENCOUNTER
Pt stopped at office today. He was seen on 2/14/24. He was given a referral to ENT which is scheduled on 4/1/24. He is feeling worse with pnd and sore throat, uvula swollen. Please advise.

## 2024-02-20 RX ORDER — AMOXICILLIN 500 MG/1
500 CAPSULE ORAL EVERY 8 HOURS SCHEDULED
Qty: 30 CAPSULE | Refills: 0 | Status: SHIPPED | OUTPATIENT
Start: 2024-02-20 | End: 2024-03-01

## 2024-02-20 NOTE — TELEPHONE ENCOUNTER
I will put in a prescription for amoxicillin.  Call if symptoms continue or increase.  If severe, go to the ER.

## 2024-03-25 DIAGNOSIS — E11.9 TYPE 2 DIABETES MELLITUS WITHOUT COMPLICATION, WITHOUT LONG-TERM CURRENT USE OF INSULIN (HCC): ICD-10-CM

## 2024-05-16 DIAGNOSIS — E11.9 TYPE 2 DIABETES MELLITUS WITHOUT COMPLICATION, WITHOUT LONG-TERM CURRENT USE OF INSULIN (HCC): ICD-10-CM

## 2024-06-19 ENCOUNTER — HOSPITAL ENCOUNTER (EMERGENCY)
Facility: HOSPITAL | Age: 49
Discharge: HOME | End: 2024-06-19
Attending: EMERGENCY MEDICINE
Payer: COMMERCIAL

## 2024-06-19 ENCOUNTER — TELEPHONE (OUTPATIENT)
Dept: FAMILY MEDICINE CLINIC | Facility: CLINIC | Age: 49
End: 2024-06-19

## 2024-06-19 ENCOUNTER — APPOINTMENT (EMERGENCY)
Dept: CT IMAGING | Facility: HOSPITAL | Age: 49
End: 2024-06-19
Payer: COMMERCIAL

## 2024-06-19 VITALS
SYSTOLIC BLOOD PRESSURE: 135 MMHG | DIASTOLIC BLOOD PRESSURE: 84 MMHG | RESPIRATION RATE: 15 BRPM | HEART RATE: 67 BPM | TEMPERATURE: 97.7 F | OXYGEN SATURATION: 94 %

## 2024-06-19 DIAGNOSIS — M54.6 BILATERAL THORACIC BACK PAIN, UNSPECIFIED CHRONICITY: ICD-10-CM

## 2024-06-19 DIAGNOSIS — E83.42 HYPOMAGNESEMIA: Primary | ICD-10-CM

## 2024-06-19 LAB
ALBUMIN SERPL BCP-MCNC: 4.6 G/DL (ref 3.5–5)
ALP SERPL-CCNC: 40 U/L (ref 34–104)
ALT SERPL W P-5'-P-CCNC: 40 U/L (ref 7–52)
ANION GAP SERPL CALCULATED.3IONS-SCNC: 12 MMOL/L (ref 4–13)
APTT PPP: 27 SECONDS (ref 23–37)
AST SERPL W P-5'-P-CCNC: 33 U/L (ref 13–39)
BACTERIA UR QL AUTO: ABNORMAL /HPF
BASE EX.OXY STD BLDV CALC-SCNC: 61.4 % (ref 60–80)
BASE EXCESS BLDV CALC-SCNC: 2.4 MMOL/L
BASOPHILS # BLD AUTO: 0.04 THOUSANDS/ÂΜL (ref 0–0.1)
BASOPHILS NFR BLD AUTO: 1 % (ref 0–1)
BILIRUB SERPL-MCNC: 0.79 MG/DL (ref 0.2–1)
BILIRUB UR QL STRIP: NEGATIVE
BNP SERPL-MCNC: 30 PG/ML (ref 0–100)
BUN SERPL-MCNC: 16 MG/DL (ref 5–25)
CALCIUM SERPL-MCNC: 10.1 MG/DL (ref 8.4–10.2)
CARDIAC TROPONIN I PNL SERPL HS: <2 NG/L
CHLORIDE SERPL-SCNC: 96 MMOL/L (ref 96–108)
CK SERPL-CCNC: 111 U/L (ref 39–308)
CLARITY UR: CLEAR
CO2 SERPL-SCNC: 28 MMOL/L (ref 21–32)
COLOR UR: YELLOW
CREAT SERPL-MCNC: 0.91 MG/DL (ref 0.6–1.3)
D DIMER PPP FEU-MCNC: 0.3 UG/ML FEU
EOSINOPHIL # BLD AUTO: 0.2 THOUSAND/ÂΜL (ref 0–0.61)
EOSINOPHIL NFR BLD AUTO: 2 % (ref 0–6)
ERYTHROCYTE [DISTWIDTH] IN BLOOD BY AUTOMATED COUNT: 13.1 % (ref 11.6–15.1)
FLUAV RNA RESP QL NAA+PROBE: NEGATIVE
FLUBV RNA RESP QL NAA+PROBE: NEGATIVE
GFR SERPL CREATININE-BSD FRML MDRD: 99 ML/MIN/1.73SQ M
GLUCOSE SERPL-MCNC: 139 MG/DL (ref 65–140)
GLUCOSE UR STRIP-MCNC: NEGATIVE MG/DL
HCO3 BLDV-SCNC: 27.9 MMOL/L (ref 24–30)
HCT VFR BLD AUTO: 44 % (ref 36.5–49.3)
HGB BLD-MCNC: 14.8 G/DL (ref 12–17)
HGB UR QL STRIP.AUTO: NEGATIVE
IMM GRANULOCYTES # BLD AUTO: 0.04 THOUSAND/UL (ref 0–0.2)
IMM GRANULOCYTES NFR BLD AUTO: 1 % (ref 0–2)
INR PPP: 1.12 (ref 0.84–1.19)
KETONES UR STRIP-MCNC: ABNORMAL MG/DL
LACTATE SERPL-SCNC: 1.1 MMOL/L (ref 0.5–2)
LACTATE SERPL-SCNC: 2.1 MMOL/L (ref 0.5–2)
LEUKOCYTE ESTERASE UR QL STRIP: NEGATIVE
LIPASE SERPL-CCNC: 7 U/L (ref 11–82)
LYMPHOCYTES # BLD AUTO: 2.35 THOUSANDS/ÂΜL (ref 0.6–4.47)
LYMPHOCYTES NFR BLD AUTO: 29 % (ref 14–44)
MAGNESIUM SERPL-MCNC: 1.2 MG/DL (ref 1.9–2.7)
MCH RBC QN AUTO: 28.6 PG (ref 26.8–34.3)
MCHC RBC AUTO-ENTMCNC: 33.6 G/DL (ref 31.4–37.4)
MCV RBC AUTO: 85 FL (ref 82–98)
MONOCYTES # BLD AUTO: 0.39 THOUSAND/ÂΜL (ref 0.17–1.22)
MONOCYTES NFR BLD AUTO: 5 % (ref 4–12)
MUCOUS THREADS UR QL AUTO: ABNORMAL
NEUTROPHILS # BLD AUTO: 5.15 THOUSANDS/ÂΜL (ref 1.85–7.62)
NEUTS SEG NFR BLD AUTO: 62 % (ref 43–75)
NITRITE UR QL STRIP: NEGATIVE
NON-SQ EPI CELLS URNS QL MICRO: ABNORMAL /HPF
NRBC BLD AUTO-RTO: 0 /100 WBCS
O2 CT BLDV-SCNC: 13.7 ML/DL
PCO2 BLDV: 46.1 MM HG (ref 42–50)
PH BLDV: 7.4 [PH] (ref 7.3–7.4)
PH UR STRIP.AUTO: 5.5 [PH]
PLATELET # BLD AUTO: 209 THOUSANDS/UL (ref 149–390)
PMV BLD AUTO: 9.4 FL (ref 8.9–12.7)
PO2 BLDV: 34 MM HG (ref 35–45)
POTASSIUM SERPL-SCNC: 3.7 MMOL/L (ref 3.5–5.3)
PROT SERPL-MCNC: 7.7 G/DL (ref 6.4–8.4)
PROT UR STRIP-MCNC: ABNORMAL MG/DL
PROTHROMBIN TIME: 14.3 SECONDS (ref 11.6–14.5)
RBC # BLD AUTO: 5.18 MILLION/UL (ref 3.88–5.62)
RBC #/AREA URNS AUTO: ABNORMAL /HPF
RSV RNA RESP QL NAA+PROBE: NEGATIVE
SARS-COV-2 RNA RESP QL NAA+PROBE: NEGATIVE
SODIUM SERPL-SCNC: 136 MMOL/L (ref 135–147)
SP GR UR STRIP.AUTO: >=1.03 (ref 1–1.03)
UROBILINOGEN UR STRIP-ACNC: <2 MG/DL
WBC # BLD AUTO: 8.17 THOUSAND/UL (ref 4.31–10.16)
WBC #/AREA URNS AUTO: ABNORMAL /HPF

## 2024-06-19 PROCEDURE — 71275 CT ANGIOGRAPHY CHEST: CPT

## 2024-06-19 PROCEDURE — 83605 ASSAY OF LACTIC ACID: CPT

## 2024-06-19 PROCEDURE — 83735 ASSAY OF MAGNESIUM: CPT

## 2024-06-19 PROCEDURE — 96375 TX/PRO/DX INJ NEW DRUG ADDON: CPT

## 2024-06-19 PROCEDURE — 84484 ASSAY OF TROPONIN QUANT: CPT

## 2024-06-19 PROCEDURE — 85610 PROTHROMBIN TIME: CPT

## 2024-06-19 PROCEDURE — 36415 COLL VENOUS BLD VENIPUNCTURE: CPT

## 2024-06-19 PROCEDURE — 83880 ASSAY OF NATRIURETIC PEPTIDE: CPT

## 2024-06-19 PROCEDURE — 85730 THROMBOPLASTIN TIME PARTIAL: CPT

## 2024-06-19 PROCEDURE — 85025 COMPLETE CBC W/AUTO DIFF WBC: CPT

## 2024-06-19 PROCEDURE — 82805 BLOOD GASES W/O2 SATURATION: CPT

## 2024-06-19 PROCEDURE — 99285 EMERGENCY DEPT VISIT HI MDM: CPT

## 2024-06-19 PROCEDURE — 93005 ELECTROCARDIOGRAM TRACING: CPT

## 2024-06-19 PROCEDURE — 74174 CTA ABD&PLVS W/CONTRAST: CPT

## 2024-06-19 PROCEDURE — 83690 ASSAY OF LIPASE: CPT

## 2024-06-19 PROCEDURE — 96366 THER/PROPH/DIAG IV INF ADDON: CPT

## 2024-06-19 PROCEDURE — 0241U HB NFCT DS VIR RESP RNA 4 TRGT: CPT

## 2024-06-19 PROCEDURE — 81001 URINALYSIS AUTO W/SCOPE: CPT

## 2024-06-19 PROCEDURE — 85379 FIBRIN DEGRADATION QUANT: CPT

## 2024-06-19 PROCEDURE — 99285 EMERGENCY DEPT VISIT HI MDM: CPT | Performed by: EMERGENCY MEDICINE

## 2024-06-19 PROCEDURE — 80053 COMPREHEN METABOLIC PANEL: CPT

## 2024-06-19 PROCEDURE — 82550 ASSAY OF CK (CPK): CPT

## 2024-06-19 PROCEDURE — 96365 THER/PROPH/DIAG IV INF INIT: CPT

## 2024-06-19 RX ORDER — ALBUTEROL SULFATE 90 UG/1
2 AEROSOL, METERED RESPIRATORY (INHALATION) ONCE
Status: COMPLETED | OUTPATIENT
Start: 2024-06-19 | End: 2024-06-19

## 2024-06-19 RX ORDER — DIPHENHYDRAMINE HYDROCHLORIDE 50 MG/ML
50 INJECTION INTRAMUSCULAR; INTRAVENOUS ONCE
Status: COMPLETED | OUTPATIENT
Start: 2024-06-19 | End: 2024-06-19

## 2024-06-19 RX ORDER — MAGNESIUM L-LACTATE 84 MG
84 TABLET, EXTENDED RELEASE ORAL DAILY
Qty: 30 TABLET | Refills: 0 | Status: SHIPPED | OUTPATIENT
Start: 2024-06-19 | End: 2024-07-19

## 2024-06-19 RX ORDER — MAGNESIUM SULFATE HEPTAHYDRATE 40 MG/ML
2 INJECTION, SOLUTION INTRAVENOUS
Status: COMPLETED | OUTPATIENT
Start: 2024-06-19 | End: 2024-06-19

## 2024-06-19 RX ORDER — ACETAMINOPHEN 10 MG/ML
1000 INJECTION, SOLUTION INTRAVENOUS ONCE
Status: COMPLETED | OUTPATIENT
Start: 2024-06-19 | End: 2024-06-19

## 2024-06-19 RX ORDER — LANOLIN ALCOHOL/MO/W.PET/CERES
400 CREAM (GRAM) TOPICAL ONCE
Status: COMPLETED | OUTPATIENT
Start: 2024-06-19 | End: 2024-06-19

## 2024-06-19 RX ADMIN — MAGNESIUM SULFATE HEPTAHYDRATE 2 G: 40 INJECTION, SOLUTION INTRAVENOUS at 19:13

## 2024-06-19 RX ADMIN — ACETAMINOPHEN 1000 MG: 10 INJECTION INTRAVENOUS at 15:44

## 2024-06-19 RX ADMIN — MAGNESIUM SULFATE HEPTAHYDRATE 2 G: 40 INJECTION, SOLUTION INTRAVENOUS at 17:18

## 2024-06-19 RX ADMIN — ALBUTEROL SULFATE 2 PUFF: 90 AEROSOL, METERED RESPIRATORY (INHALATION) at 19:45

## 2024-06-19 RX ADMIN — DIPHENHYDRAMINE HYDROCHLORIDE 50 MG: 50 INJECTION, SOLUTION INTRAMUSCULAR; INTRAVENOUS at 15:43

## 2024-06-19 RX ADMIN — SODIUM CHLORIDE 1000 ML: 0.9 INJECTION, SOLUTION INTRAVENOUS at 17:18

## 2024-06-19 RX ADMIN — IOHEXOL 100 ML: 350 INJECTION, SOLUTION INTRAVENOUS at 17:44

## 2024-06-19 RX ADMIN — Medication 400 MG: at 17:19

## 2024-06-19 NOTE — TELEPHONE ENCOUNTER
Patient called stating last couple days having trouble breathing and feels when he sleeps on his sides his lungs hurt. Does have history of blood clots in his lungs and heart issues. Wanted refill on inhaler that I do not see on file. I did suggest he go to the ER to be checked out due to his symptoms and history and he said he was thinking about going. He will head there today and has follow up appt with Aparna to establish care this week.

## 2024-06-19 NOTE — Clinical Note
Quinn Ramírez was seen and treated in our emergency department on 6/19/2024.                Diagnosis:     Quinn  .    He may return on this date: 06/21/2024    Quinn Ramírez was seen in the ED on 06/19/24. Please excuse him from work for today and tomorrow as he gets better       If you have any questions or concerns, please don't hesitate to call.      Maya Early MD    ______________________________           _______________          _______________  Hospital Representative                              Date                                Time

## 2024-06-19 NOTE — ED ATTENDING ATTESTATION
6/19/2024  I, Brian Butler DO, saw and evaluated the patient. I have discussed the patient with the resident/non-physician practitioner and agree with the resident's/non-physician practitioner's findings, Plan of Care, and MDM as documented in the resident's/non-physician practitioner's note, except where noted. All available labs and Radiology studies were reviewed.  I was present for key portions of any procedure(s) performed by the resident/non-physician practitioner and I was immediately available to provide assistance.       At this point I agree with the current assessment done in the Emergency Department.  I have conducted an independent evaluation of this patient a history and physical is as follows:    ED Course  ED Course as of 06/19/24 1937 Wed Jun 19, 2024   1615 D-Dimer, Quant: 0.30   1701 Blood Pressure: 145/74   1701 Pulse: 76   1701 Respirations: 12   1701 SpO2: 96 %   1935 EKG interpreted by myself.  EKG dated 6/19/2024 at 1523 demonstrates normal sinus rhythm at 79 bpm, normal OR, QRS, QTc intervals, no STEMI.     This is a 48-year-old male he has a past medical history which includes anxiety, history of pulmonary embolism, not currently on anticoagulation, T2DM, obesity, KAYY, asthma, bipolar disorder, chronic low back pain, presenting to the emergency department for evaluation.    Patient describes that his PCP is retiring and he has been attempting to transition to a new PCP and has upcoming appointment but was not made aware of recent issues of thoracic discomfort and breathing difficulty with respect to his remote history of pulmonary embolism he was advised to come to the ER urgently/emergently for evaluation.  Patient himself reports for a few months now, worsening he has been experiencing symptoms of tightness in the right upper quadrant, bilateral posterior flank/torso region and reports it is worse when he takes a deep breath.  He describes the pain is constant but worsens with  breathing and position changes.  Denies any associated nausea vomiting lightheadedness.  Endorses dizziness but cannot specify further.  Denies headache, syncope, vision changes.  No associated nausea vomiting diaphoresis.  Reports the symptoms are similar to when he was diagnosed with a blood clot in the past.  States that in the past he was on a blood thinner but his PCP took him off of it and said that he had thoughts about restarting it but it has not.  Patient reports that he should be on multiple medications but is out of his lisinopril without refills but is taking atorvastatin.    It was noted by resident the patient's right lower extremity appeared swollen there is some redness bilaterally with the patient attributes to sunburn from being at the races.  Patient does endorse that the right lower extremity is asymmetrically enlarged when compared to the left.       Review of Systems   Constitutional:  Positive for activity change. Negative for appetite change, chills and fever.   HENT:  Negative for congestion.    Eyes:  Negative for visual disturbance.   Respiratory:  Positive for chest tightness and shortness of breath. Negative for cough, choking, wheezing and stridor.    Cardiovascular:  Positive for chest pain and leg swelling. Negative for palpitations.   Gastrointestinal:  Positive for abdominal pain. Negative for abdominal distention.   Genitourinary:  Positive for flank pain.   Musculoskeletal:  Positive for back pain. Negative for joint swelling and neck pain.   Skin:  Negative for rash.   Neurological:  Positive for dizziness. Negative for syncope, light-headedness, numbness and headaches.   Psychiatric/Behavioral:  The patient is nervous/anxious.      Physical Exam  Vitals and nursing note reviewed.   Constitutional:       General: He is not in acute distress.     Appearance: He is well-developed. He is obese. He is not ill-appearing, toxic-appearing or diaphoretic.   HENT:      Head:  Normocephalic and atraumatic.      Mouth/Throat:      Mouth: Mucous membranes are moist.      Pharynx: Oropharynx is clear.   Neck:      Vascular: No JVD.      Trachea: No tracheal deviation.   Cardiovascular:      Rate and Rhythm: Normal rate and regular rhythm.      Pulses: No decreased pulses.      Heart sounds: No murmur heard.     No friction rub. No gallop.   Pulmonary:      Breath sounds: No decreased breath sounds, wheezing, rhonchi or rales.   Chest:      Chest wall: No tenderness.   Abdominal:      General: There is no distension.      Palpations: Abdomen is soft.      Tenderness: There is no abdominal tenderness. There is no guarding or rebound. Negative signs include Hoskins's sign and McBurney's sign.       Musculoskeletal:        Back:       Right lower leg: No tenderness. No edema.      Left lower leg: No tenderness. No edema.   Skin:     General: Skin is warm and dry.      Capillary Refill: Capillary refill takes less than 2 seconds.      Findings: Erythema present.      Comments: Mild sunburn to multiple sites including bilateral lower legs   Neurological:      General: No focal deficit present.      Mental Status: He is alert.   Psychiatric:         Mood and Affect: Mood is anxious.       Medical Decision Making  48-year-old male recent changes and establish follow-up with PCP, no history of PE not easily reviewed in the chart with subsequent imaging reviewed demonstrating no persistent evidence of PE, presenting for poor defined 1 month history of worsening bilateral thoracic discomfort shortness of breath with patient making some illusions to anxiety, history of PE, history of coronary vascular problem.  Symptoms appear continuous over this time and are nonexertional low suspicion for cardiac etiology.  Given patient's concern broad workup was initiated including D-dimer, troponin, CTA dissection study.  Labs were also obtained.  Review of chart reveals a history of some right upper quadrant  irritation and fatty infiltration of the liver no transaminitis today.  No significant abdominal tenderness.  No significant GI symptoms associate with this.  Overall workup unremarkable for any acute findings to explain the patient's symptoms.  He appears clinically stable.  He is proceeding with ongoing PCP follow-up will have him continue his home medications and refer to PCP for further evaluation.    Patient was noted to have hypomagnesemia (1.2), this appears to be somewhat chronic as he has had similar values in the past.  Will provide IV plus p.o. repletion for hypomagnesemia.  This may have some contribution to the patient's symptoms but is nonspecific.     Problems Addressed:  Bilateral thoracic back pain, unspecified chronicity: acute illness or injury  Hypomagnesemia: acute illness or injury    Amount and/or Complexity of Data Reviewed  Labs: ordered. Decision-making details documented in ED Course.  Radiology: ordered.  ECG/medicine tests: ordered and independent interpretation performed. Decision-making details documented in ED Course.    Risk  OTC drugs.  Prescription drug management.          Critical Care Time  Procedures

## 2024-06-19 NOTE — ED PROVIDER NOTES
History  Chief Complaint   Patient presents with    Shortness of Breath     Patient presents with shortness of breath for the past month at rest and ambulation. Sent in by PCP because of hx of PE. Denies chest pain      48-year-old male history of PE in the setting of coronary aneurysm s/p repair, sleep apnea not able to get CPAP, asthma presenting with ongoing shortness of breath and bilateral flank pain.  Has been going on for about a month.  Shortness of breath mostly exertional but also sometimes at rest.  Having pain both sides, worse when moving or taking deep breath.  No substernal chest pain.  Mild to moderate improvement in shortness of breath when he uses a family members inhaler, has been unable to get refill of his asthma inhaler due to his PCP retiring.  No fevers or cough.  No unintentional weight gain.  Right calf is larger than left.  Unsure whether he is having hematuria though denies dysuria.    Has had prior reaction to IV contrast in the past, gets very antsy and agitated.  He was here just over a year ago and was given 50 mg IV Benadryl and was able to tolerate the CT scan with contrast without much issue.        Prior to Admission Medications   Prescriptions Last Dose Informant Patient Reported? Taking?   aspirin 81 mg chewable tablet   Yes No   Sig: Chew 81 mg daily   atorvastatin (LIPITOR) 10 mg tablet   No No   Sig: Take 1 tablet (10 mg total) by mouth daily   divalproex sodium (DEPAKOTE) 500 mg EC tablet   Yes No   Sig: Take 500 mg by mouth 2 (two) times a day.   lisinopril (ZESTRIL) 10 mg tablet   No No   Sig: Take 1 tablet (10 mg total) by mouth daily   metFORMIN (GLUCOPHAGE) 1000 MG tablet   No No   Sig: Take 1 tablet (1,000 mg total) by mouth 2 (two) times a day   risperiDONE (RisperDAL) 2 mg tablet  Self No No   Sig: Take 1 tablet (2 mg total) by mouth 2 (two) times a day      Facility-Administered Medications: None       Past Medical History:   Diagnosis Date    Allergic rhinitis      resolved 6/20/17    Asthma     resolved 6/20/17    Bipolar 1 disorder (HCC)     resolved 6/20/17    Chronic kidney disease     Diabetes mellitus (HCC)     Hematuria     resolved 6/20/17    Hyperlipidemia     Leukocytosis     resolved 6/20/17    Diana-Hussein tear     Pulmonary emboli (HCC)     resolved 6/20/17    Seizures (HCC)     Sleep apnea     resolved 6/20/17       Past Surgical History:   Procedure Laterality Date    ANGIOPLASTY      previous stent placement    CARDIAC SURGERY      NASAL SEPTUM SURGERY         Family History   Problem Relation Age of Onset    Cancer Mother     Diabetes Mother     Hypertension Mother     Asthma Father     Cancer Paternal Grandfather     Colon cancer Family      I have reviewed and agree with the history as documented.    E-Cigarette/Vaping    E-Cigarette Use Never User      E-Cigarette/Vaping Substances    Nicotine No     THC No     CBD No     Flavoring No     Other No     Unknown No      Social History     Tobacco Use    Smoking status: Never     Passive exposure: Never    Smokeless tobacco: Former     Types: Chew   Vaping Use    Vaping status: Never Used   Substance Use Topics    Alcohol use: Never     Alcohol/week: 0.0 standard drinks of alcohol     Comment: occasional as per [employer]    Drug use: No        Review of Systems   Constitutional:  Positive for fatigue. Negative for activity change, fever and unexpected weight change.   Respiratory:  Positive for chest tightness and shortness of breath. Negative for cough.    Cardiovascular:  Positive for leg swelling. Negative for chest pain and palpitations.   Gastrointestinal:  Positive for abdominal pain. Negative for diarrhea, nausea and vomiting.   Genitourinary:  Positive for flank pain. Negative for dysuria.   Skin:  Negative for wound.   Allergic/Immunologic: Negative for immunocompromised state.   Neurological:  Negative for syncope.   All other systems reviewed and are negative.      Physical Exam  ED Triage Vitals    Temperature Pulse Respirations Blood Pressure SpO2   06/19/24 1520 06/19/24 1520 06/19/24 1520 06/19/24 1523 06/19/24 1520   97.7 °F (36.5 °C) 83 22 (!) 174/92 94 %      Temp Source Heart Rate Source Patient Position - Orthostatic VS BP Location FiO2 (%)   06/19/24 1520 06/19/24 1520 06/19/24 1523 06/19/24 1523 --   Temporal Monitor Lying Left arm       Pain Score       06/19/24 1520       No Pain             Orthostatic Vital Signs  Vitals:    06/19/24 1630 06/19/24 1645 06/19/24 1700 06/19/24 1958   BP: 132/78 132/79 125/77 135/84   Pulse: 68 64 67    Patient Position - Orthostatic VS:           Physical Exam  Vitals and nursing note reviewed.   Constitutional:       Appearance: He is not toxic-appearing or diaphoretic.   HENT:      Head: Normocephalic and atraumatic.      Right Ear: External ear normal.      Left Ear: External ear normal.      Nose: Nose normal.      Mouth/Throat:      Mouth: Mucous membranes are moist.   Eyes:      General: No scleral icterus.     Extraocular Movements: Extraocular movements intact.      Conjunctiva/sclera: Conjunctivae normal.   Cardiovascular:      Rate and Rhythm: Normal rate and regular rhythm.      Pulses: Normal pulses.           Radial pulses are 2+ on the right side.        Dorsalis pedis pulses are 2+ on the right side and 2+ on the left side.      Heart sounds: Normal heart sounds, S1 normal and S2 normal. No murmur heard.  Pulmonary:      Effort: Pulmonary effort is normal. No respiratory distress.      Breath sounds: Normal breath sounds. No stridor. No decreased breath sounds, wheezing, rhonchi or rales.   Abdominal:      Palpations: Abdomen is soft.      Tenderness: There is abdominal tenderness in the right upper quadrant.   Musculoskeletal:         General: Normal range of motion.      Cervical back: Normal range of motion.      Comments: Tender with even light palpation to back and sides.  No rashes.    Right calf larger in diameter compared to left but no  pitting edema.  Also redder than the left, attributed to sunburn   Skin:     General: Skin is warm and dry.   Neurological:      General: No focal deficit present.      Mental Status: He is alert and oriented to person, place, and time.   Psychiatric:         Mood and Affect: Mood normal.         ED Medications  Medications   diphenhydrAMINE (BENADRYL) injection 50 mg (50 mg Intravenous Given 6/19/24 1543)   acetaminophen (Ofirmev) injection 1,000 mg (0 mg Intravenous Stopped 6/19/24 1559)   magnesium sulfate 2 g/50 mL IVPB (premix) 2 g (0 g Intravenous Stopped 6/19/24 1957)   magnesium Oxide (MAG-OX) tablet 400 mg (400 mg Oral Given 6/19/24 1719)   sodium chloride 0.9 % bolus 1,000 mL (0 mL Intravenous Stopped 6/19/24 1818)   iohexol (OMNIPAQUE) 350 MG/ML injection (MULTI-DOSE) 100 mL (100 mL Intravenous Given 6/19/24 1744)   albuterol (PROVENTIL HFA,VENTOLIN HFA) inhaler 2 puff (2 puffs Inhalation Given 6/19/24 1945)       Diagnostic Studies  Results Reviewed       Procedure Component Value Units Date/Time    Lactic acid 2 Hours [[id number]]  (Normal) Collected: 06/19/24 1828    Lab Status: Final result Specimen: Blood from Arm, Right Updated: 06/19/24 1850     LACTIC ACID 1.1 mmol/L     Narrative:      Result may be elevated if tourniquet was used during collection.    Urine Microscopic [[id number]]  (Abnormal) Collected: 06/19/24 1731    Lab Status: Final result Specimen: Urine, Clean Catch Updated: 06/19/24 1750     RBC, UA 1-2 /hpf      WBC, UA None Seen /hpf      Epithelial Cells Occasional /hpf      Bacteria, UA Occasional /hpf      MUCUS THREADS Occasional    UA w Reflex to Microscopic w Reflex to Culture [[id number]]  (Abnormal) Collected: 06/19/24 1731    Lab Status: Final result Specimen: Urine, Clean Catch Updated: 06/19/24 1736     Color, UA Yellow     Clarity, UA Clear     Specific Gravity, UA >=1.030     pH, UA 5.5     Leukocytes, UA Negative     Nitrite, UA Negative     Protein, UA Trace mg/dl       Glucose, UA Negative mg/dl      Ketones, UA 10 (1+) mg/dl      Urobilinogen, UA <2.0 mg/dl      Bilirubin, UA Negative     Occult Blood, UA Negative    FLU/RSV/COVID - if FLU/RSV clinically relevant [[id number]]  (Normal) Collected: 06/19/24 1536    Lab Status: Final result Specimen: Nares from Nose Updated: 06/19/24 1634     SARS-CoV-2 Negative     INFLUENZA A PCR Negative     INFLUENZA B PCR Negative     RSV PCR Negative    Narrative:      FOR PEDIATRIC PATIENTS - copy/paste COVID Guidelines URL to browser: [email or web address]    SARS-CoV-2 assay is a Nucleic Acid Amplification assay intended for the  qualitative detection of nucleic acid from SARS-CoV-2 in nasopharyngeal  swabs. Results are for the presumptive identification of SARS-CoV-2 RNA.    Positive results are indicative of infection with SARS-CoV-2, the virus  causing COVID-19, but do not rule out bacterial infection or co-infection  with other viruses. Laboratories within the [place] and its  territories are required to report all positive results to the appropriate  public health authorities. Negative results do not preclude SARS-CoV-2  infection and should not be used as the sole basis for treatment or other  patient management decisions. Negative results must be combined with  clinical observations, patient history, and epidemiological information.  This test has not been FDA cleared or approved.    This test has been authorized by FDA under an Emergency Use Authorization  (EUA). This test is only authorized for the duration of time the  declaration that circumstances exist justifying the authorization of the  emergency use of an in vitro diagnostic tests for detection of SARS-CoV-2  virus and/or diagnosis of COVID-19 infection under section 564(b)(1) of  the Act, 21 U.S.C. 360bbb-3(b)(1), unless the authorization is terminated  or revoked sooner. The test has been validated but independent  review by FDA  and CLIA is pending.    Test performed using [employer] GeneXpert: This RT-PCR assay targets N2,  a region unique to SARS-CoV-2. A conserved region in the E-gene was chosen  for pan-Sarbecovirus detection which includes SARS-CoV-2.    According to CMS-2020-01-R, this platform meets the definition of high-throughput technology.    CK [[id number]]  (Normal) Collected: 06/19/24 1536    Lab Status: Final result Specimen: Blood from Arm, Right Updated: 06/19/24 1632     Total  U/L     B-Type Natriuretic Peptide(BNP) [[id number]]  (Normal) Collected: 06/19/24 1536    Lab Status: Final result Specimen: Blood from Arm, Right Updated: 06/19/24 1631     BNP 30 pg/mL     HS Troponin 0hr (reflex protocol) [[id number]]  (Normal) Collected: 06/19/24 1536    Lab Status: Final result Specimen: Blood from Arm, Right Updated: 06/19/24 1614     hs TnI 0hr <2 ng/L     Lactic acid, plasma (w/reflex if result > 2.0) [[id number]]  (Abnormal) Collected: 06/19/24 1536    Lab Status: Final result Specimen: Blood from Arm, Right Updated: 06/19/24 1608     LACTIC ACID 2.1 mmol/L     Narrative:      Result may be elevated if tourniquet was used during collection.    Lipase [[id number]]  (Abnormal) Collected: 06/19/24 1536    Lab Status: Final result Specimen: Blood from Arm, Right Updated: 06/19/24 1608     Lipase 7 u/L     Comprehensive metabolic panel [[id number]] Collected: 06/19/24 1536    Lab Status: Final result Specimen: Blood from Arm, Right Updated: 06/19/24 1608     Sodium 136 mmol/L      Potassium 3.7 mmol/L      Chloride 96 mmol/L      CO2 28 mmol/L      ANION GAP 12 mmol/L      BUN 16 mg/dL      Creatinine 0.91 mg/dL      Glucose 139 mg/dL      Calcium 10.1 mg/dL      AST 33 U/L      ALT 40 U/L      Alkaline Phosphatase 40 U/L      Total Protein 7.7 g/dL      Albumin 4.6 g/dL      Total Bilirubin 0.79 mg/dL      eGFR 99 ml/min/1.73sq m     Narrative:      National Kidney Disease Foundation guidelines for Chronic Kidney  Disease (CKD):     Stage 1 with normal or high GFR (GFR > 90 mL/min/1.73 square meters)    Stage 2 Mild CKD (GFR = 60-89 mL/min/1.73 square meters)    Stage 3A Moderate CKD (GFR = 45-59 mL/min/1.73 square meters)    Stage 3B Moderate CKD (GFR = 30-44 mL/min/1.73 square meters)    Stage 4 Severe CKD (GFR = 15-29 mL/min/1.73 square meters)    Stage 5 End Stage CKD (GFR <15 mL/min/1.73 square meters)  Note: GFR calculation is accurate only with a steady state creatinine    Magnesium [[id number]]  (Abnormal) Collected: 06/19/24 1536    Lab Status: Final result Specimen: Blood from Arm, Right Updated: 06/19/24 1608     Magnesium 1.2 mg/dL     D-Dimer [[id number]]  (Normal) Collected: 06/19/24 1536    Lab Status: Final result Specimen: Blood from Arm, Right Updated: 06/19/24 1603     D-Dimer, Quant 0.30 ug/ml FEU     Protime-INR [[id number]]  (Normal) Collected: 06/19/24 1536    Lab Status: Final result Specimen: Blood from Arm, Right Updated: 06/19/24 1602     Protime 14.3 seconds      INR 1.12    APTT [[id number]]  (Normal) Collected: 06/19/24 1536    Lab Status: Final result Specimen: Blood from Arm, Right Updated: 06/19/24 1602     PTT 27 seconds     Blood gas, venous [[id number]]  (Abnormal) Collected: 06/19/24 1542    Lab Status: Final result Specimen: Blood from Arm, Right Updated: 06/19/24 1554     pH, Lebron 7.400     pCO2, Lebron 46.1 mm Hg      pO2, Lebron 34.0 mm Hg      HCO3, Lebron 27.9 mmol/L      Base Excess, Lebron 2.4 mmol/L      O2 Content, Lebron 13.7 ml/dL      O2 HGB, VENOUS 61.4 %     CBC and differential [[id number]] Collected: 06/19/24 1536    Lab Status: Final result Specimen: Blood from Arm, Right Updated: 06/19/24 1554     WBC 8.17 Thousand/uL      RBC 5.18 Million/uL      Hemoglobin 14.8 g/dL      Hematocrit 44.0 %      MCV 85 fL      MCH 28.6 pg      MCHC 33.6 g/dL      RDW 13.1 %      MPV 9.4 fL      Platelets 209 Thousands/uL      nRBC 0 /100 WBCs      Segmented % 62 %      Immature Grans % 1 %      Lymphocytes %  29 %      Monocytes % 5 %      Eosinophils Relative 2 %      Basophils Relative 1 %      Absolute Neutrophils 5.15 Thousands/µL      Absolute Immature Grans 0.04 Thousand/uL      Absolute Lymphocytes 2.35 Thousands/µL      Absolute Monocytes 0.39 Thousand/µL      Eosinophils Absolute 0.20 Thousand/µL      Basophils Absolute 0.04 Thousands/µL                    CTA dissection protocol chest/abdomen/pelvis   Final Result by Usama Luong MD (06/19 1911)      No aortic aneurysm or dissection.      Minor discoid atelectasis at the right lower lobe.      Fatty liver and splenomegaly unchanged.               Workstation performed: [id number]               Procedures  ECG 12 Lead Documentation Only    Date/Time: 6/19/2024 3:40 PM    Performed by: Maya Early MD  Authorized by: Maya Early MD    Indications / Diagnosis:  Sob  ECG reviewed by me, the ED Provider: yes    Patient location:  ED  Previous ECG:     Previous ECG:  Unavailable    Comparison to cardiac monitor: Yes    Interpretation:     Interpretation: normal    Rate:     ECG rate:  79    ECG rate assessment: normal    Rhythm:     Rhythm: sinus rhythm    Ectopy:     Ectopy: none    QRS:     QRS axis:  Left    QRS intervals:  Normal  Conduction:     Conduction: normal    ST segments:     ST segments:  Normal  T waves:     T waves: normal          ED Course  ED Course as of 06/19/24 2217 Wed Jun 19, 2024   1612 LACTIC ACID(!): 2.1  No signs of infection   1612 MAGNESIUM(!): 1.2  Was 1.1 a year ago   1612 D-Dimer, Quant: 0.30   1615 hs TnI 0hr: <2   1635 Total CK: 111   1736 UA w Reflex to Microscopic w Reflex to Culture(!)  negative   1910 LACTIC ACID: 1.1   1919 CTA dissection protocol chest/abdomen/pelvis  No aortic aneurysm or dissection.     Minor discoid atelectasis at the right lower lobe.     Fatty liver and splenomegaly unchanged.               HEART Risk Score      Flowsheet Row Most Recent Value   Heart Score Risk Calculator    History 0 Filed  at: 06/19/2024 1615   ECG 0 Filed at: 06/19/2024 1615   Age 1 Filed at: 06/19/2024 1615   Risk Factors 2 Filed at: 06/19/2024 1615   Troponin 0 Filed at: 06/19/2024 1615   HEART Score 3 Filed at: 06/19/2024 1615                        SBIRT 22yo+      Flowsheet Row Most Recent Value   Initial Alcohol Screen: US AUDIT-C     1. How often do you have a drink containing alcohol? 0 Filed at: 06/19/2024 1520   2. How many drinks containing alcohol do you have on a typical day you are drinking?  0 Filed at: 06/19/2024 1520   3a. Male UNDER 65: How often do you have five or more drinks on one occasion? 0 Filed at: 06/19/2024 1520   3b. FEMALE Any Age, or MALE 65+: How often do you have 4 or more drinks on one occassion? 0 Filed at: 06/19/2024 1520   Audit-C Score 0 Filed at: 06/19/2024 1520   KATHY: How many times in the past year have you...    Used an illegal drug or used a prescription medication for non-medical reasons? Never Filed at: 06/19/2024 1520            Wells' Criteria for PE      Flowsheet Row Most Recent Value   Wells' Criteria for PE    Clinical signs and symptoms of DVT 3 Filed at: 06/19/2024 1545   PE is primary diagnosis or equally likely 3 Filed at: 06/19/2024 1545   HR >100 0 Filed at: 06/19/2024 1545   Immobilization at least 3 days or Surgery in the previous 4 weeks 0 Filed at: 06/19/2024 1545   Previous, objectively diagnosed PE or DVT 1.5 Filed at: 06/19/2024 1545   Hemoptysis 0 Filed at: 06/19/2024 1545   Malignancy with treatment within 6 months or palliative 0 Filed at: 06/19/2024 1545   Wells' Criteria Total 7.5 Filed at: 06/19/2024 1545              Medical Decision Making  Initial Impression: Especially given his history and there is swelling in his right calf, there is concern for PE.  Without wheezing do not suspect asthma or COPD.  Concerned about how much pain he is having in his lower back and sides, especially with initial  and prior cardiac surgery, worried about possibility of a  dissection.  Other intra-abdominal pathology include gallbladder disease, hepatitis, pancreatitis, colitis, UTI/pyelonephritis, kidney stone.    Final Impression: Evaluation here relatively within normal limits other than hypomagnesemia.  On review of records his chronically low.  Possible that this is contributing to some muscle spasms but overall unclear.  With D-dimer negative do not suspect DVT in RLE.  Furthermore CTA did not demonstrate PE.  CT also did not show any signs of dissection or any other acute pathology.  Unfortunately still not entirely sure the cause of patient's presentation however all acute pathology ruled out.  Patient reports that his breathing got better with albuterol inhaler which she had been getting from his family so ordered an albuterol inhaler here from today home.  Already has follow-up with his primary care provider in 2 days where he can get further evaluation of his symptoms.  Patient was discharged in good condition.      Problems Addressed:  Bilateral thoracic back pain, unspecified chronicity: chronic illness or injury with exacerbation, progression, or side effects of treatment  Hypomagnesemia: complicated acute illness or injury    Amount and/or Complexity of Data Reviewed  Labs: ordered. Decision-making details documented in ED Course.  Radiology: ordered. Decision-making details documented in ED Course.  ECG/medicine tests: ordered and independent interpretation performed.    Risk  OTC drugs.  Prescription drug management.          Disposition  Final diagnoses:   Hypomagnesemia   Bilateral thoracic back pain, unspecified chronicity     Time reflects when diagnosis was documented in both MDM as applicable and the Disposition within this note       Time User Action Codes Description Comment    6/19/2024  5:33 PM Brian Butler [E83.42] Hypomagnesemia     6/19/2024  5:34 PM Brian Butler [M54.6] Bilateral thoracic back pain, unspecified chronicity            ED Disposition       ED Disposition   Discharge    Condition   Stable    Date/Time   Wed Jun 19, 2024 1919    Comment   Quinn TJ Ramírez discharge to home/self care.                   Follow-up Information       Follow up With Specialties Details Why Contact Info Additional Information    Aparna Salgado PA-C Physician Assistant Go on 6/21/2024  [address]  [place] [address]  [phone]       [facility] Emergency Department Emergency Medicine Go to  As needed, If symptoms worsen [address] [address]  [phone] [facility] Emergency Department, [address], [place], [address]            Discharge Medication List as of 6/19/2024  7:21 PM        START taking these medications    Details   magnesium (MAGTAB) 84 MG (7MEQ) TBCR Take 1 tablet (84 mg total) by mouth daily, Starting Wed 6/19/2024, Until Fri 7/19/2024, Normal           CONTINUE these medications which have NOT CHANGED    Details   aspirin 81 mg chewable tablet Chew 81 mg daily, Historical Med      atorvastatin (LIPITOR) 10 mg tablet Take 1 tablet (10 mg total) by mouth daily, Starting Mon 1/22/2024, Normal      divalproex sodium (DEPAKOTE) 500 mg EC tablet Take 500 mg by mouth 2 (two) times a day., Until Discontinued, Historical Med      lisinopril (ZESTRIL) 10 mg tablet Take 1 tablet (10 mg total) by mouth daily, Starting Mon 1/22/2024, Normal      metFORMIN (GLUCOPHAGE) 1000 MG tablet Take 1 tablet (1,000 mg total) by mouth 2 (two) times a day, Starting Fri 5/17/2024, Normal      risperiDONE (RisperDAL) 2 mg tablet Take 1 tablet (2 mg total) by mouth 2 (two) times a day, Starting Mon 9/17/2018, No Print           No discharge procedures on file.    PDMP Review       None             ED Provider  Attending physically available and evaluated Quinn TJ Ramírez. I managed the patient along with the ED Attending.    Electronically Signed by           Maya Early MD  06/19/24  [id number]

## 2024-06-20 LAB
ATRIAL RATE: 79 BPM
P AXIS: 22 DEGREES
PR INTERVAL: 130 MS
QRS AXIS: -2 DEGREES
QRSD INTERVAL: 80 MS
QT INTERVAL: 360 MS
QTC INTERVAL: 412 MS
T WAVE AXIS: 37 DEGREES
VENTRICULAR RATE: 79 BPM

## 2024-06-20 PROCEDURE — 93010 ELECTROCARDIOGRAM REPORT: CPT | Performed by: INTERNAL MEDICINE

## 2024-06-26 ENCOUNTER — OFFICE VISIT (OUTPATIENT)
Dept: FAMILY MEDICINE CLINIC | Facility: CLINIC | Age: 49
End: 2024-06-26
Payer: COMMERCIAL

## 2024-06-26 ENCOUNTER — TELEPHONE (OUTPATIENT)
Dept: FAMILY MEDICINE CLINIC | Facility: CLINIC | Age: 49
End: 2024-06-26

## 2024-06-26 VITALS
RESPIRATION RATE: 18 BRPM | OXYGEN SATURATION: 96 % | WEIGHT: 296.2 LBS | HEART RATE: 83 BPM | DIASTOLIC BLOOD PRESSURE: 84 MMHG | BODY MASS INDEX: 44.89 KG/M2 | SYSTOLIC BLOOD PRESSURE: 128 MMHG | TEMPERATURE: 97 F | HEIGHT: 68 IN

## 2024-06-26 DIAGNOSIS — Z09 HOSPITAL DISCHARGE FOLLOW-UP: Primary | ICD-10-CM

## 2024-06-26 DIAGNOSIS — E11.9 TYPE 2 DIABETES MELLITUS WITHOUT COMPLICATION, WITHOUT LONG-TERM CURRENT USE OF INSULIN (HCC): ICD-10-CM

## 2024-06-26 DIAGNOSIS — J45.20 MILD INTERMITTENT ASTHMA WITHOUT COMPLICATION: ICD-10-CM

## 2024-06-26 LAB
LEFT EYE DIABETIC RETINOPATHY: NORMAL
LEFT EYE IMAGE QUALITY: NORMAL
LEFT EYE MACULAR EDEMA: NORMAL
LEFT EYE OTHER RETINOPATHY: NORMAL
RIGHT EYE DIABETIC RETINOPATHY: NORMAL
RIGHT EYE IMAGE QUALITY: NORMAL
RIGHT EYE MACULAR EDEMA: NORMAL
RIGHT EYE OTHER RETINOPATHY: NORMAL
SEVERITY (EYE EXAM): NORMAL
SL AMB POCT HEMOGLOBIN AIC: 7.6 (ref ?–6.5)

## 2024-06-26 PROCEDURE — 99213 OFFICE O/P EST LOW 20 MIN: CPT | Performed by: PHYSICIAN ASSISTANT

## 2024-06-26 PROCEDURE — 92250 FUNDUS PHOTOGRAPHY W/I&R: CPT | Performed by: PHYSICIAN ASSISTANT

## 2024-06-26 PROCEDURE — 83036 HEMOGLOBIN GLYCOSYLATED A1C: CPT | Performed by: PHYSICIAN ASSISTANT

## 2024-06-26 RX ORDER — MELATONIN
1000
COMMUNITY
Start: 2024-06-24

## 2024-06-26 RX ORDER — ATORVASTATIN CALCIUM 10 MG/1
10 TABLET, FILM COATED ORAL DAILY
Qty: 30 TABLET | Refills: 5 | Status: SHIPPED | OUTPATIENT
Start: 2024-06-26

## 2024-06-26 RX ORDER — ALBUTEROL SULFATE 90 UG/1
2 AEROSOL, METERED RESPIRATORY (INHALATION) EVERY 6 HOURS PRN
Qty: 6.7 G | Refills: 5 | Status: SHIPPED | OUTPATIENT
Start: 2024-06-26

## 2024-06-26 RX ORDER — LISINOPRIL 10 MG/1
10 TABLET ORAL DAILY
Qty: 90 TABLET | Refills: 1 | Status: SHIPPED | OUTPATIENT
Start: 2024-06-26

## 2024-06-26 RX ORDER — FLUTICASONE PROPIONATE AND SALMETEROL 250; 50 UG/1; UG/1
1 POWDER RESPIRATORY (INHALATION) 2 TIMES DAILY
Qty: 60 BLISTER | Refills: 5 | Status: SHIPPED | OUTPATIENT
Start: 2024-06-26 | End: 2024-12-23

## 2024-06-26 NOTE — ASSESSMENT & PLAN NOTE
Stable, great control. Continue same.    Lab Results   Component Value Date    HGBA1C 7.6 (A) 06/26/2024

## 2024-06-26 NOTE — ASSESSMENT & PLAN NOTE
Pt no longer taking maintenance inhaler or rescue inhaler, will restart both. Pt reminded to rinse mouth thoroughly after using steroid inhaler.

## 2024-06-26 NOTE — PROGRESS NOTES
Ambulatory Visit  Name: Quinn Ramírez      : 1975      MRN: [id number]  Encounter Provider: Aparna Salgado PA-C  Encounter Date: 2024   Encounter department: [facility]    Assessment & Plan   1. Hospital discharge follow-up  2. Mild intermittent asthma without complication  Assessment & Plan:  Pt no longer taking maintenance inhaler or rescue inhaler, will restart both. Pt reminded to rinse mouth thoroughly after using steroid inhaler.   Orders:  -     Fluticasone-Salmeterol (Advair) 250-50 mcg/dose inhaler; Inhale 1 puff 2 (two) times a day Rinse mouth after use.  -     albuterol (Proventil HFA) 90 mcg/act inhaler; Inhale 2 puffs every 6 (six) hours as needed for wheezing  3. Type 2 diabetes mellitus without complication, without long-term current use of insulin ([facility])  Assessment & Plan:  Stable, great control. Continue same.    Lab Results   Component Value Date    HGBA1C 7.6 (A) 2024     Orders:  -     IRIS Diabetic eye exam  -     lisinopril (ZESTRIL) 10 mg tablet; Take 1 tablet (10 mg total) by mouth daily  -     atorvastatin (LIPITOR) 10 mg tablet; Take 1 tablet (10 mg total) by mouth daily  -     POCT hemoglobin A1c       History of Present Illness     Quinn is here today for ER follow up.  Went to ER complaining of SOB, has h/o asthma and was given albuterol. Albuterol does give him relief. There is some pain along anterior ribs bilaterally with deep breaths, likely some mild costochondritis.  DM today shows great control with an A1C of 7.6.        Review of Systems   Constitutional:  Negative for chills and fever.   HENT:  Negative for ear pain and sore throat.    Eyes:  Negative for pain and visual disturbance.   Respiratory:  Negative for cough.    Cardiovascular:  Negative for chest pain and palpitations.   Gastrointestinal:  Negative for abdominal pain and vomiting.   Genitourinary:  Negative for dysuria and hematuria.   Musculoskeletal:  Negative for arthralgias and back  "pain.   Skin:  Negative for color change and rash.   Neurological:  Negative for seizures and syncope.   All other systems reviewed and are negative.      Objective     /84 (BP Location: Left arm, Patient Position: Sitting, Cuff Size: Adult)   Pulse 83   Temp (!) 97 °F (36.1 °C) (Temporal)   Resp 18   Ht 5' 8\" (1.727 m)   Wt 134 kg (296 lb 3.2 oz)   SpO2 96%   BMI 45.04 kg/m²     Physical Exam  Vitals reviewed.   Constitutional:       General: He is not in acute distress.     Appearance: He is well-developed. He is not diaphoretic.   HENT:      Head: Normocephalic and atraumatic.      Right Ear: Hearing, tympanic membrane, ear canal and external ear normal.      Left Ear: Hearing, tympanic membrane, ear canal and external ear normal.      Nose: Nose normal.      Mouth/Throat:      Mouth: Mucous membranes are moist.      Pharynx: Oropharynx is clear. Uvula midline. No oropharyngeal exudate.   Eyes:      General: No scleral icterus.        Right eye: No discharge.         Left eye: No discharge.      Conjunctiva/sclera: Conjunctivae normal.   Neck:      Thyroid: No thyromegaly.      Vascular: No carotid bruit.   Cardiovascular:      Rate and Rhythm: Normal rate and regular rhythm.      Pulses: no weak pulses.           Dorsalis pedis pulses are 2+ on the right side and 2+ on the left side.        Posterior tibial pulses are 2+ on the right side and 2+ on the left side.      Heart sounds: Normal heart sounds. No murmur heard.  Pulmonary:      Effort: Pulmonary effort is normal. No respiratory distress.      Breath sounds: Normal breath sounds. No wheezing.   Abdominal:      General: Bowel sounds are normal. There is no distension.      Palpations: Abdomen is soft. There is no mass.      Tenderness: There is no abdominal tenderness. There is no guarding or rebound.   Musculoskeletal:         General: No tenderness. Normal range of motion.      Cervical back: Neck supple.   Feet:      Right foot:      Skin " integrity: No ulcer, skin breakdown, erythema, warmth, callus or dry skin.      Left foot:      Skin integrity: No ulcer, skin breakdown, erythema, warmth, callus or dry skin.   Lymphadenopathy:      Cervical: No cervical adenopathy.   Skin:     General: Skin is warm and dry.      Findings: No erythema or rash.   Neurological:      Mental Status: He is alert and oriented to person, place, and time.   Psychiatric:         Behavior: Behavior normal.         Thought Content: Thought content normal.         Judgment: Judgment normal.           Patient's shoes and socks removed.    Right Foot/Ankle   Right Foot Inspection  Skin Exam: skin normal and skin intact. No dry skin, no warmth, no callus, no erythema, no maceration, no abnormal color, no pre-ulcer, no ulcer and no callus.     Toe Exam: ROM and strength within normal limits.     Sensory   Monofilament testing: intact    Vascular  Capillary refills: < 3 seconds  The right DP pulse is 2+. The right PT pulse is 2+.     Left Foot/Ankle  Left Foot Inspection  Skin Exam: skin normal and skin intact. No dry skin, no warmth, no erythema, no maceration, normal color, no pre-ulcer, no ulcer and no callus.     Toe Exam: ROM and strength within normal limits.     Sensory   Monofilament testing: intact    Vascular  Capillary refills: < 3 seconds  The left DP pulse is 2+. The left PT pulse is 2+.     Assign Risk Category  No deformity present  No loss of protective sensation  No weak pulses  Risk: 0        Administrative Statements

## 2024-09-24 DIAGNOSIS — J45.20 MILD INTERMITTENT ASTHMA WITHOUT COMPLICATION: ICD-10-CM

## 2024-09-24 RX ORDER — FLUTICASONE PROPIONATE AND SALMETEROL 250; 50 UG/1; UG/1
1 POWDER RESPIRATORY (INHALATION) 2 TIMES DAILY
Qty: 180 BLISTER | Refills: 1 | Status: SHIPPED | OUTPATIENT
Start: 2024-09-24

## 2024-11-11 DIAGNOSIS — E11.9 TYPE 2 DIABETES MELLITUS WITHOUT COMPLICATION, WITHOUT LONG-TERM CURRENT USE OF INSULIN (HCC): ICD-10-CM

## 2024-11-19 DIAGNOSIS — J45.20 MILD INTERMITTENT ASTHMA WITHOUT COMPLICATION: ICD-10-CM

## 2024-11-20 RX ORDER — ALBUTEROL SULFATE 90 UG/1
INHALANT RESPIRATORY (INHALATION)
Qty: 8.5 G | Refills: 0 | Status: SHIPPED | OUTPATIENT
Start: 2024-11-20

## 2024-12-17 DIAGNOSIS — E11.9 TYPE 2 DIABETES MELLITUS WITHOUT COMPLICATION, WITHOUT LONG-TERM CURRENT USE OF INSULIN (HCC): ICD-10-CM

## 2024-12-18 RX ORDER — LISINOPRIL 10 MG/1
10 TABLET ORAL DAILY
Qty: 90 TABLET | Refills: 1 | Status: SHIPPED | OUTPATIENT
Start: 2024-12-18

## 2025-04-11 DIAGNOSIS — J45.20 MILD INTERMITTENT ASTHMA WITHOUT COMPLICATION: ICD-10-CM

## 2025-04-11 RX ORDER — FLUTICASONE PROPIONATE AND SALMETEROL 250; 50 UG/1; UG/1
1 POWDER RESPIRATORY (INHALATION) 2 TIMES DAILY
Qty: 180 BLISTER | Refills: 0 | OUTPATIENT
Start: 2025-04-11

## 2025-04-21 ENCOUNTER — OFFICE VISIT (OUTPATIENT)
Dept: DENTISTRY | Facility: CLINIC | Age: 50
End: 2025-04-21
Payer: COMMERCIAL

## 2025-04-21 VITALS — HEART RATE: 78 BPM | DIASTOLIC BLOOD PRESSURE: 91 MMHG | SYSTOLIC BLOOD PRESSURE: 132 MMHG

## 2025-04-21 DIAGNOSIS — K08.89 PAIN, DENTAL: Primary | ICD-10-CM

## 2025-04-21 PROCEDURE — D0230 INTRAORAL - PERIAPICAL EACH ADDITIONAL RADIOGRAPHIC IMAGE: HCPCS | Performed by: STUDENT IN AN ORGANIZED HEALTH CARE EDUCATION/TRAINING PROGRAM

## 2025-04-21 PROCEDURE — D0272 BITEWINGS - 2 RADIOGRAPHIC IMAGES: HCPCS | Performed by: STUDENT IN AN ORGANIZED HEALTH CARE EDUCATION/TRAINING PROGRAM

## 2025-04-21 PROCEDURE — D0140 LIMITED ORAL EVALUATION - PROBLEM FOCUSED: HCPCS | Performed by: STUDENT IN AN ORGANIZED HEALTH CARE EDUCATION/TRAINING PROGRAM

## 2025-04-21 PROCEDURE — D0220 INTRAORAL - PERIAPICAL FIRST RADIOGRAPHIC IMAGE: HCPCS | Performed by: STUDENT IN AN ORGANIZED HEALTH CARE EDUCATION/TRAINING PROGRAM

## 2025-04-21 RX ORDER — METRONIDAZOLE 500 MG/1
500 TABLET ORAL EVERY 8 HOURS SCHEDULED
Qty: 21 TABLET | Refills: 0 | Status: SHIPPED | OUTPATIENT
Start: 2025-04-21 | End: 2025-04-30 | Stop reason: ALTCHOICE

## 2025-04-21 RX ORDER — CHLORHEXIDINE GLUCONATE ORAL RINSE 1.2 MG/ML
15 SOLUTION DENTAL 2 TIMES DAILY
Qty: 120 ML | Refills: 0 | Status: SHIPPED | OUTPATIENT
Start: 2025-04-21

## 2025-04-21 NOTE — PROGRESS NOTES
Pt presents for limited exam  PMH reviewed, no changes    CC: pain everywhere, can't remember the last time pt was at a dentist.    Extraoral exam:no extraoral swelling  Intraoral Exam: no intraoral swelling, pain to percussion on #14/15. Pain generally feels aching.     Xrays Taken: 3 Bws, # PA (14/15/24/3)     Pt Referred to [facility] For ext of #1,14, 15, 16,17,32    Explained to pt that there is an infection because the infected teeth are still there and to completely stop the infection from recurring, infected teeth need to be extracted. Pt understands that an antibiotic is only a temporary fix to the infection and will work to lessen it and may alleviate some of the pain felt.       Advised patient to establish himself as a patient in a dental practice due to several other areas of caries noted clinically and on radiographs. Patient advised that he will need a full comprehensive exam as today was just a limited exam.

## 2025-04-22 ENCOUNTER — TELEPHONE (OUTPATIENT)
Dept: DENTISTRY | Facility: CLINIC | Age: 50
End: 2025-04-22

## 2025-04-22 NOTE — TELEPHONE ENCOUNTER
PT called back to inform me that [facility]'s [facility] but stated that they told him that we should fax his referral and x-rays to them.    I informed PT that we normally don't have to send x-rays because they will most likely do a PAN, as noted on referral.  Also, if they do want us to send x-rays he would have to come in to sign a release for us to do so.  Lastly, we can not fax x-rays - they would need to be emailed if we send them.    SB

## 2025-04-22 NOTE — TELEPHONE ENCOUNTER
PT called - said he called [facility] and they said they don't see anyone on emergency basis - that they can't see him until August.  He said the lady answering was very rude and told him he doesn't know what he's talking about, etc.  As we were on the phone they called him and made appt for consult Aug 4.  He said that appt is on [address] in [place].    Then, he called the # for [facility] and the phone # didn't work.  I provided him with the # that's now on their website which is [phone].    Otherwise, I did advise him to call other providers on the [facility] list that we provided to him and noted that I will inform the Althea and Dr. Chapin of the above.    SB

## 2025-04-30 ENCOUNTER — APPOINTMENT (OUTPATIENT)
Dept: LAB | Facility: HOSPITAL | Age: 50
End: 2025-04-30
Payer: COMMERCIAL

## 2025-04-30 ENCOUNTER — OFFICE VISIT (OUTPATIENT)
Dept: FAMILY MEDICINE CLINIC | Facility: CLINIC | Age: 50
End: 2025-04-30
Payer: COMMERCIAL

## 2025-04-30 VITALS
RESPIRATION RATE: 18 BRPM | DIASTOLIC BLOOD PRESSURE: 82 MMHG | SYSTOLIC BLOOD PRESSURE: 132 MMHG | OXYGEN SATURATION: 97 % | HEART RATE: 83 BPM | TEMPERATURE: 98.8 F | WEIGHT: 261 LBS | HEIGHT: 68 IN | BODY MASS INDEX: 39.56 KG/M2

## 2025-04-30 DIAGNOSIS — R17 JAUNDICE: ICD-10-CM

## 2025-04-30 DIAGNOSIS — E11.9 TYPE 2 DIABETES MELLITUS WITHOUT COMPLICATION, WITHOUT LONG-TERM CURRENT USE OF INSULIN (HCC): ICD-10-CM

## 2025-04-30 DIAGNOSIS — G47.33 OBSTRUCTIVE SLEEP APNEA: ICD-10-CM

## 2025-04-30 DIAGNOSIS — Z76.89 ENCOUNTER TO ESTABLISH CARE: Primary | ICD-10-CM

## 2025-04-30 DIAGNOSIS — J45.20 MILD INTERMITTENT ASTHMA WITHOUT COMPLICATION: ICD-10-CM

## 2025-04-30 LAB
ALBUMIN SERPL BCG-MCNC: 4.4 G/DL (ref 3.5–5)
ALP SERPL-CCNC: 34 U/L (ref 34–104)
ALT SERPL W P-5'-P-CCNC: 48 U/L (ref 7–52)
ANION GAP SERPL CALCULATED.3IONS-SCNC: 8 MMOL/L (ref 4–13)
AST SERPL W P-5'-P-CCNC: 33 U/L (ref 13–39)
BILIRUB SERPL-MCNC: 0.59 MG/DL (ref 0.2–1)
BUN SERPL-MCNC: 14 MG/DL (ref 5–25)
CALCIUM SERPL-MCNC: 9.7 MG/DL (ref 8.4–10.2)
CHLORIDE SERPL-SCNC: 98 MMOL/L (ref 96–108)
CHOLEST SERPL-MCNC: 178 MG/DL (ref ?–200)
CO2 SERPL-SCNC: 28 MMOL/L (ref 21–32)
CREAT SERPL-MCNC: 0.78 MG/DL (ref 0.6–1.3)
CREAT UR-MCNC: 188.9 MG/DL
ERYTHROCYTE [DISTWIDTH] IN BLOOD BY AUTOMATED COUNT: 13.2 % (ref 11.6–15.1)
GFR SERPL CREATININE-BSD FRML MDRD: 105 ML/MIN/1.73SQ M
GLUCOSE SERPL-MCNC: 158 MG/DL (ref 65–140)
HCT VFR BLD AUTO: 40.4 % (ref 36.5–49.3)
HDLC SERPL-MCNC: 30 MG/DL
HGB BLD-MCNC: 14 G/DL (ref 12–17)
LDLC SERPL CALC-MCNC: 80 MG/DL (ref 0–100)
MCH RBC QN AUTO: 29.6 PG (ref 26.8–34.3)
MCHC RBC AUTO-ENTMCNC: 34.7 G/DL (ref 31.4–37.4)
MCV RBC AUTO: 85 FL (ref 82–98)
MICROALBUMIN UR-MCNC: 13.2 MG/L
MICROALBUMIN/CREAT 24H UR: 7 MG/G CREATININE (ref 0–30)
NONHDLC SERPL-MCNC: 148 MG/DL
PLATELET # BLD AUTO: 177 THOUSANDS/UL (ref 149–390)
PMV BLD AUTO: 8.5 FL (ref 8.9–12.7)
POTASSIUM SERPL-SCNC: 3.7 MMOL/L (ref 3.5–5.3)
PROT SERPL-MCNC: 7.7 G/DL (ref 6.4–8.4)
RBC # BLD AUTO: 4.73 MILLION/UL (ref 3.88–5.62)
SL AMB POCT HEMOGLOBIN AIC: 6 (ref ?–6.5)
SODIUM SERPL-SCNC: 134 MMOL/L (ref 135–147)
TRIGL SERPL-MCNC: 340 MG/DL (ref ?–150)
WBC # BLD AUTO: 5.75 THOUSAND/UL (ref 4.31–10.16)

## 2025-04-30 PROCEDURE — 80053 COMPREHEN METABOLIC PANEL: CPT

## 2025-04-30 PROCEDURE — 87340 HEPATITIS B SURFACE AG IA: CPT

## 2025-04-30 PROCEDURE — 86705 HEP B CORE ANTIBODY IGM: CPT

## 2025-04-30 PROCEDURE — 83036 HEMOGLOBIN GLYCOSYLATED A1C: CPT | Performed by: STUDENT IN AN ORGANIZED HEALTH CARE EDUCATION/TRAINING PROGRAM

## 2025-04-30 PROCEDURE — 82570 ASSAY OF URINE CREATININE: CPT

## 2025-04-30 PROCEDURE — 86704 HEP B CORE ANTIBODY TOTAL: CPT

## 2025-04-30 PROCEDURE — 80061 LIPID PANEL: CPT

## 2025-04-30 PROCEDURE — 99214 OFFICE O/P EST MOD 30 MIN: CPT | Performed by: STUDENT IN AN ORGANIZED HEALTH CARE EDUCATION/TRAINING PROGRAM

## 2025-04-30 PROCEDURE — 86803 HEPATITIS C AB TEST: CPT

## 2025-04-30 PROCEDURE — 85027 COMPLETE CBC AUTOMATED: CPT

## 2025-04-30 PROCEDURE — 87389 HIV-1 AG W/HIV-1&-2 AB AG IA: CPT

## 2025-04-30 PROCEDURE — 36415 COLL VENOUS BLD VENIPUNCTURE: CPT

## 2025-04-30 PROCEDURE — T1015 CLINIC SERVICE: HCPCS | Performed by: STUDENT IN AN ORGANIZED HEALTH CARE EDUCATION/TRAINING PROGRAM

## 2025-04-30 PROCEDURE — 82043 UR ALBUMIN QUANTITATIVE: CPT

## 2025-04-30 RX ORDER — LISINOPRIL 10 MG/1
10 TABLET ORAL DAILY
Qty: 90 TABLET | Refills: 1 | Status: SHIPPED | OUTPATIENT
Start: 2025-04-30

## 2025-04-30 RX ORDER — ALBUTEROL SULFATE 90 UG/1
INHALANT RESPIRATORY (INHALATION)
Qty: 8.5 G | Refills: 0 | Status: CANCELLED | OUTPATIENT
Start: 2025-04-30

## 2025-04-30 RX ORDER — RISPERIDONE 1 MG/1
TABLET ORAL
COMMUNITY
Start: 2025-04-30 | End: 2025-04-30 | Stop reason: SDUPTHER

## 2025-04-30 RX ORDER — DIVALPROEX SODIUM 500 MG/1
1000 TABLET, FILM COATED, EXTENDED RELEASE ORAL
COMMUNITY
Start: 2025-04-25

## 2025-04-30 RX ORDER — FLUTICASONE PROPIONATE AND SALMETEROL 250; 50 UG/1; UG/1
1 POWDER RESPIRATORY (INHALATION) 2 TIMES DAILY
Qty: 180 BLISTER | Refills: 1 | Status: CANCELLED | OUTPATIENT
Start: 2025-04-30

## 2025-04-30 NOTE — ASSESSMENT & PLAN NOTE
Lab Results   Component Value Date    HGBA1C 7.6 (A) 06/26/2024     -A1c of 6.0% in office today, controlled   -Metformin refilled   -Routine labs ordered as below   -RTC in 2 months     Orders:    POCT hemoglobin A1c    Albumin / creatinine urine ratio; Future    POCT hemoglobin A1c    metFORMIN (GLUCOPHAGE) 1000 MG tablet; Take 1 tablet (1,000 mg total) by mouth 2 (two) times a day    lisinopril (ZESTRIL) 10 mg tablet; Take 1 tablet (10 mg total) by mouth daily    Comprehensive metabolic panel; Future    Lipid panel; Future

## 2025-04-30 NOTE — PROGRESS NOTES
"Name: Quinn Ramírez      : 1975      MRN: [id number]  Encounter Provider: Janet Silverman DO  Encounter Date: 2025   Encounter department: [facility]    Assessment & Plan  Encounter to establish care    -Concerns addressed   -PMHx reviewed   -Routine labs ordered   -RTC for annual exam         Type 2 diabetes mellitus without complication, without long-term current use of insulin ([facility])    Lab Results   Component Value Date    HGBA1C 7.6 (A) 2024     -A1c of 6.0% in office today, controlled   -Metformin refilled   -Routine labs ordered as below   -RTC in 2 months     Orders:    POCT hemoglobin A1c    Albumin / creatinine urine ratio; Future    POCT hemoglobin A1c    metFORMIN (GLUCOPHAGE) 1000 MG tablet; Take 1 tablet (1,000 mg total) by mouth 2 (two) times a day    lisinopril (ZESTRIL) 10 mg tablet; Take 1 tablet (10 mg total) by mouth daily    Comprehensive metabolic panel; Future    Lipid panel; Future    Mild intermittent asthma without complication  -Will hold off on ordering inhalers until PFTs completed   -RTC in 2 months     Orders:    Complete PFT with post bronchodilator; Future    Jaundice  -Recommend cessation of Tylenol   -Labs as ordered     Orders:    CBC and Platelet; Future    Chronic Hepatitis Panel; Future    HIV 1/2 AG/AB w Reflex [facility] for 2 yr old and above; Future    Obstructive sleep apnea  -Sleep study ordered   Orders:    Ambulatory Referral to Sleep Medicine; Future         History of Present Illness     Quinn Ramírez is a 49 y.o. y/o M who presents to me for the first time to establish care. Reports the [facility] team felt he had yellowing in his eyes. Takes three tylenol's every other day for back and dental pain. Noted to have hepatic steatosis and splenomegaly on CTA in 2024. Otherwise no complaints today. Reports a hx of a \"heart anyerusum\" when he was 31 y/o that \"burst\" and left him hospitalized for several days. During that " "time states he had blood clots in his lungs and was placed on warfarin. Was on warfarin until about 3 years ago. Unclear why it was stopped. Most recent CTA in in June 2024 with no anyersum or dissection. Denies chest pain, SOB, syncope.     Reports hx of KAYY proven via sleep study but said insurance won't cover the CPAP. Does report snoring, noctural tachycardia, and daytime fatigue.     Has albuterol and Advair on med list, he believed this to help with his KAYY. Does note tachycardia with albuterol use.     PMHx: diabetes 2, \"heart anyersum\", KAYY not on CPAP, mood disorder (following with [facility])    PSHx: \"stent\" placement 20 years ago   Meds: See list, updated with patient   Allergies: shellfish, epinephrine   Immunizations: In need of Hep A   Family hx: mother with breast cancer, ovarian cancer, heart issues. Dad with heart issues. Son with no issues.   Social hx: No smoking, but does chew tobacco. Denies ETOH use and recreational drug use.         Review of Systems   Constitutional:  Negative for fever.   HENT:  Positive for dental problem.    Respiratory:  Negative for cough and shortness of breath.    Cardiovascular:  Negative for chest pain.   Gastrointestinal:  Negative for nausea and vomiting.   Musculoskeletal:  Positive for back pain.   Skin:  Negative for rash.   Neurological:  Negative for dizziness and syncope.   Psychiatric/Behavioral:  Positive for sleep disturbance.      Past Medical History:   Diagnosis Date    Allergic rhinitis     resolved 6/20/17    Asthma     resolved 6/20/17    Bipolar 1 disorder (HCC)     resolved 6/20/17    Chronic kidney disease     Diabetes mellitus (HCC)     Hematuria     resolved 6/20/17    Hyperlipidemia     Leukocytosis     resolved 6/20/17    Diana-Hussein tear     Pulmonary emboli (HCC)     resolved 6/20/17    Seizures ([facility])     Sleep apnea     resolved 6/20/17     Past Surgical History:   Procedure Laterality Date    ANGIOPLASTY      previous stent " placement    CARDIAC SURGERY      NASAL SEPTUM SURGERY       Family History   Problem Relation Age of Onset    Cancer Mother     Diabetes Mother     Hypertension Mother     Asthma Father     Cancer Paternal Grandfather     Colon cancer Family      Social History     Tobacco Use    Smoking status: Never     Passive exposure: Never    Smokeless tobacco: Current     Types: Chew   Vaping Use    Vaping status: Never Used   Substance and Sexual Activity    Alcohol use: Never     Alcohol/week: 0.0 standard drinks of alcohol     Comment: occasional as per [employer]    Drug use: No    Sexual activity: Not Currently     Partners: Female     Current Outpatient Medications on File Prior to Visit   Medication Sig    albuterol (PROVENTIL HFA,VENTOLIN HFA) 90 mcg/act inhaler inhale 2 puffs by mouth and INTO THE LUNGS every 6 hours if needed for wheezing    aspirin 81 mg chewable tablet Chew 81 mg daily    chlorhexidine (PERIDEX) 0.12 % solution Apply 15 mL to the mouth or throat 2 (two) times a day    cholecalciferol (VITAMIN D3) 1,000 units tablet 1,000 Units    divalproex sodium (DEPAKOTE ER) 500 mg 24 hr tablet Take 1,000 mg by mouth daily at bedtime    Fluticasone-Salmeterol (Advair) 250-50 mcg/dose inhaler inhale 1 puff by mouth and INTO THE LUNGS twice a day Rinse mouth after use    risperiDONE (RisperDAL) 2 mg tablet Take 1 tablet (2 mg total) by mouth 2 (two) times a day    [DISCONTINUED] lisinopril (ZESTRIL) 10 mg tablet take 1 tablet by mouth once daily    [DISCONTINUED] risperiDONE (RisperDAL) 1 mg tablet     [DISCONTINUED] atorvastatin (LIPITOR) 10 mg tablet Take 1 tablet (10 mg total) by mouth daily (Patient not taking: Reported on 4/30/2025)    [DISCONTINUED] divalproex sodium (DEPAKOTE) 500 mg EC tablet Take 500 mg by mouth 2 (two) times a day. (Patient not taking: Reported on 4/30/2025)    [DISCONTINUED] magnesium (MAGTAB) 84 MG (7MEQ) TBCR Take 1 tablet (84 mg total) by mouth daily (Patient taking differently:  "Take 84 mg by mouth daily Pt did not know it was sent so he never started)    [DISCONTINUED] metFORMIN (GLUCOPHAGE) 1000 MG tablet Take 1 tablet (1,000 mg total) by mouth 2 (two) times a day    [DISCONTINUED] metroNIDAZOLE (FLAGYL) 500 mg tablet Take 1 tablet (500 mg total) by mouth every 8 (eight) hours for 7 days     Allergies   Allergen Reactions    Coconut Oil - Food Allergy Throat Swelling    Epinephrine Other (See Comments)     \"pass out\"    Iodine - Food Allergy Other (See Comments)     shellfish    Other Other (See Comments)     NAUSEA VOMITING TO SHELLFISH    Shellfish-Derived Products - Food Allergy GI Intolerance     Immunization History   Administered Date(s) Administered    COVID-19 MODERNA VACC 0.5 ML IM 03/24/2021, 04/21/2021    Hep B, Adolescent or Pediatric 08/16/2016, 12/28/2016    Hep B, adult 08/16/2016, 12/28/2016, 06/25/2020    INFLUENZA 08/05/2014, 08/25/2015, 08/25/2015, 08/16/2016, 11/30/2017, 08/14/2018, 09/03/2020, 12/18/2021    Influenza Quadrivalent, 6-35 Months IM 08/24/2015    Influenza, injectable, quadrivalent, preservative free 0.5 mL 08/14/2018, 02/14/2023    Influenza, recombinant, quadrivalent,injectable, preservative free 10/29/2019    Influenza, seasonal, injectable 09/05/2012, 10/21/2013, 08/16/2016, 11/30/2017    Influenza, seasonal, injectable, preservative free 08/05/2014    Pneumococcal Conjugate 13-Valent 09/03/2020    Pneumococcal Polysaccharide PPV23 11/13/2015, 08/14/2018, 12/18/2021    Tdap 04/20/2017, 06/25/2020     Objective   /82 (BP Location: Left arm, Patient Position: Sitting, Cuff Size: Large)   Pulse 83   Temp 98.8 °F (37.1 °C) (Tympanic)   Resp 18   Ht 5' 8\" (1.727 m)   Wt 118 kg (261 lb)   SpO2 97%   BMI 39.68 kg/m²     Physical Exam  Vitals and nursing note reviewed.   Constitutional:       General: He is not in acute distress.     Appearance: Normal appearance. He is obese. He is not ill-appearing or toxic-appearing.   HENT:      Head: " Normocephalic and atraumatic.      Nose: Nose normal.   Eyes:      General: Scleral icterus (mild) present.   Cardiovascular:      Rate and Rhythm: Normal rate and regular rhythm.      Heart sounds: Normal heart sounds. No murmur heard.     No friction rub. No gallop.   Pulmonary:      Effort: Pulmonary effort is normal. No respiratory distress.      Breath sounds: Normal breath sounds. No wheezing, rhonchi or rales.   Abdominal:      General: Abdomen is flat.      Palpations: Abdomen is soft.      Tenderness: There is no abdominal tenderness.   Musculoskeletal:      Right lower leg: No edema.      Left lower leg: No edema.   Skin:     General: Skin is warm and dry.      Coloration: Skin is not jaundiced.   Neurological:      General: No focal deficit present.      Mental Status: He is alert and oriented to person, place, and time.      Gait: Gait normal.   Psychiatric:         Mood and Affect: Mood normal.         Behavior: Behavior normal.         Thought Content: Thought content normal.       Janet Silverman DO   PGY-2 Rural  Residency   [facility]

## 2025-04-30 NOTE — ASSESSMENT & PLAN NOTE
-Will hold off on ordering inhalers until PFTs completed   -RTC in 2 months     Orders:    Complete PFT with post bronchodilator; Future

## 2025-05-01 LAB
HBV CORE AB SER QL: NORMAL
HBV CORE IGM SER QL: NORMAL
HBV SURFACE AG SER QL: NORMAL
HCV AB SER QL: NORMAL
HIV 1+2 AB+HIV1 P24 AG SERPL QL IA: NORMAL

## 2025-05-02 ENCOUNTER — TRANSCRIBE ORDERS (OUTPATIENT)
Dept: SLEEP CENTER | Facility: CLINIC | Age: 50
End: 2025-05-02

## 2025-05-02 DIAGNOSIS — G47.33 OSA (OBSTRUCTIVE SLEEP APNEA): Primary | ICD-10-CM

## 2025-05-27 ENCOUNTER — HOSPITAL ENCOUNTER (OUTPATIENT)
Dept: PULMONOLOGY | Facility: HOSPITAL | Age: 50
Discharge: HOME | End: 2025-05-27
Attending: STUDENT IN AN ORGANIZED HEALTH CARE EDUCATION/TRAINING PROGRAM

## 2025-05-27 RX ORDER — ALBUTEROL SULFATE 0.83 MG/ML
2.5 SOLUTION RESPIRATORY (INHALATION) ONCE AS NEEDED
Status: DISCONTINUED | OUTPATIENT
Start: 2025-05-27 | End: 2025-05-31 | Stop reason: HOSPADM

## 2025-05-28 ENCOUNTER — HOSPITAL ENCOUNTER (OUTPATIENT)
Dept: PULMONOLOGY | Facility: HOSPITAL | Age: 50
Discharge: HOME | End: 2025-05-28
Attending: STUDENT IN AN ORGANIZED HEALTH CARE EDUCATION/TRAINING PROGRAM
Payer: COMMERCIAL

## 2025-05-28 DIAGNOSIS — J45.20 MILD INTERMITTENT ASTHMA WITHOUT COMPLICATION: ICD-10-CM

## 2025-05-28 PROCEDURE — 94729 DIFFUSING CAPACITY: CPT

## 2025-05-28 PROCEDURE — 94060 EVALUATION OF WHEEZING: CPT

## 2025-05-28 PROCEDURE — 94726 PLETHYSMOGRAPHY LUNG VOLUMES: CPT

## 2025-05-28 PROCEDURE — 94760 N-INVAS EAR/PLS OXIMETRY 1: CPT

## 2025-05-28 RX ORDER — ALBUTEROL SULFATE 0.83 MG/ML
2.5 SOLUTION RESPIRATORY (INHALATION) ONCE AS NEEDED
Status: COMPLETED | OUTPATIENT
Start: 2025-05-28 | End: 2025-05-28

## 2025-05-28 RX ADMIN — ALBUTEROL SULFATE 2.5 MG: 2.5 SOLUTION RESPIRATORY (INHALATION) at 15:51

## 2025-07-02 ENCOUNTER — OFFICE VISIT (OUTPATIENT)
Dept: FAMILY MEDICINE CLINIC | Facility: CLINIC | Age: 50
End: 2025-07-02
Payer: COMMERCIAL

## 2025-07-02 VITALS
HEART RATE: 92 BPM | OXYGEN SATURATION: 98 % | SYSTOLIC BLOOD PRESSURE: 110 MMHG | RESPIRATION RATE: 16 BRPM | WEIGHT: 255.2 LBS | DIASTOLIC BLOOD PRESSURE: 80 MMHG | TEMPERATURE: 98.3 F | BODY MASS INDEX: 38.68 KG/M2 | HEIGHT: 68 IN

## 2025-07-02 DIAGNOSIS — S16.1XXA STRAIN OF NECK MUSCLE, INITIAL ENCOUNTER: ICD-10-CM

## 2025-07-02 DIAGNOSIS — Z00.00 ANNUAL PHYSICAL EXAM: Primary | ICD-10-CM

## 2025-07-02 DIAGNOSIS — E11.9 TYPE 2 DIABETES MELLITUS WITHOUT COMPLICATION, WITHOUT LONG-TERM CURRENT USE OF INSULIN (HCC): ICD-10-CM

## 2025-07-02 DIAGNOSIS — Z71.6 ENCOUNTER FOR SMOKING CESSATION COUNSELING: ICD-10-CM

## 2025-07-02 PROCEDURE — T1015 CLINIC SERVICE: HCPCS | Performed by: FAMILY MEDICINE

## 2025-07-02 PROCEDURE — 99396 PREV VISIT EST AGE 40-64: CPT | Performed by: STUDENT IN AN ORGANIZED HEALTH CARE EDUCATION/TRAINING PROGRAM

## 2025-07-02 RX ORDER — METHOCARBAMOL 500 MG/1
500 TABLET, FILM COATED ORAL 3 TIMES DAILY
Qty: 15 TABLET | Refills: 0 | Status: SHIPPED | OUTPATIENT
Start: 2025-07-02 | End: 2025-07-07

## 2025-07-02 RX ORDER — ATORVASTATIN CALCIUM 40 MG/1
20 TABLET, FILM COATED ORAL DAILY
Qty: 15 TABLET | Refills: 1 | Status: SHIPPED | OUTPATIENT
Start: 2025-07-02

## 2025-07-02 NOTE — ASSESSMENT & PLAN NOTE
Lab Results   Component Value Date    HGBA1C 6.0 04/30/2025     Orders:  •  Ambulatory Referral to Nutrition Services; Future  •  atorvastatin (LIPITOR) 40 mg tablet; Take 0.5 tablets (20 mg total) by mouth before bedtime.

## 2025-07-02 NOTE — PATIENT INSTRUCTIONS
"Patient Education     Routine physical for adults   The Basics   Written by the doctors and editors at [facility]   What is a physical? -- A physical is a routine visit, or \"check-up,\" with your doctor. You might also hear it called a \"wellness visit\" or \"preventive visit.\"  During each visit, the doctor will:   Ask about your physical and mental health   Ask about your habits, behaviors, and lifestyle   Do an exam   Give you vaccines if needed   Talk to you about any medicines you take   Give advice about your health   Answer your questions  Getting regular check-ups is an important part of taking care of your health. It can help your doctor find and treat any problems you have. But it's also important for preventing health problems.  A routine physical is different from a \"sick visit.\" A sick visit is when you see a doctor because of a health concern or problem. Since physicals are scheduled ahead of time, you can think about what you want to ask the doctor.  How often should I get a physical? -- It depends on your age and health. In general, for people age 21 years and older:   If you are younger than 50 years, you might be able to get a physical every 3 years.   If you are 50 years or older, your doctor might recommend a physical every year.  If you have an ongoing health condition, like diabetes or high blood pressure, your doctor will probably want to see you more often.  What happens during a physical? -- In general, each visit will include:   Physical exam - The doctor or nurse will check your height, weight, heart rate, and blood pressure. They will also look at your eyes and ears. They will ask about how you are feeling and whether you have any symptoms that bother you.   Medicines - It's a good idea to bring a list of all the medicines you take to each doctor visit. Your doctor will talk to you about your medicines and answer any questions. Tell them if you are having any side effects that bother you. You " "should also tell them if you are having trouble paying for any of your medicines.   Habits and behaviors - This includes:   Your diet   Your exercise habits   Whether you smoke, drink alcohol, or use drugs   Whether you are sexually active   Whether you feel safe at home  Your doctor will talk to you about things you can do to improve your health and lower your risk of health problems. They will also offer help and support. For example, if you want to quit smoking, they can give you advice and might prescribe medicines. If you want to improve your diet or get more physical activity, they can help you with this, too.   Lab tests, if needed - The tests you get will depend on your age and situation. For example, your doctor might want to check your:   Cholesterol   Blood sugar   Iron level   Vaccines - The recommended vaccines will depend on your age, health, and what vaccines you already had. Vaccines are very important because they can prevent certain serious or deadly infections.   Discussion of screening - \"Screening\" means checking for diseases or other health problems before they cause symptoms. Your doctor can recommend screening based on your age, risk, and preferences. This might include tests to check for:   Cancer, such as breast, prostate, cervical, ovarian, colorectal, prostate, lung, or skin cancer   Sexually transmitted infections, such as chlamydia and gonorrhea   Mental health conditions like depression and anxiety  Your doctor will talk to you about the different types of screening tests. They can help you decide which screenings to have. They can also explain what the results might mean.   Answering questions - The physical is a good time to ask the doctor or nurse questions about your health. If needed, they can refer you to other doctors or specialists, too.  Adults older than 65 years often need other care, too. As you get older, your doctor will talk to you about:   How to prevent falling at " home   Hearing or vision tests   Memory testing   How to take your medicines safely   Making sure that you have the help and support you need at home  All topics are updated as new evidence becomes available and our peer review process is complete.  This topic retrieved from [employer] on: May 02, 2024.  Topic 937959 Version 1.0  Release: 32.4.3 - C32.122  © 2024 UpToDate, Inc. and/or its affiliates. All rights reserved.  Consumer Information Use and Disclaimer   Disclaimer: This generalized information is a limited summary of diagnosis, treatment, and/or medication information. It is not meant to be comprehensive and should be used as a tool to help the user understand and/or assess potential diagnostic and treatment options. It does NOT include all information about conditions, treatments, medications, side effects, or risks that may apply to a specific patient. It is not intended to be medical advice or a substitute for the medical advice, diagnosis, or treatment of a health care provider based on the health care provider's examination and assessment of a patient's specific and unique circumstances. Patients must speak with a health care provider for complete information about their health, medical questions, and treatment options, including any risks or benefits regarding use of medications. This information does not endorse any treatments or medications as safe, effective, or approved for treating a specific patient. UpToDate, Inc. and its affiliates disclaim any warranty or liability relating to this information or the use thereof.The use of this information is governed by the Terms of Use, available at https://www.woltersFullContactuwer.com/en/know/clinical-effectiveness-terms. 2024© UpToDate, Inc. and its affiliates and/or licensors. All rights reserved.  Copyright   © 2024 UpToDate, Inc. and/or its affiliates. All rights reserved.

## 2025-07-02 NOTE — PROGRESS NOTES
Adult Annual Physical  Name: Quinn Ramírez      : 1975      MRN: [id number]  Encounter Provider: Janet Silverman DO  Encounter Date: 2025   Encounter department: [facility] [place]    :  Assessment & Plan  Annual physical exam  -Concerns addressed   -Vaccines up to date   -Routine labs up to date   -RTC for weight follow up in two months        BMI 38.0-38.9,adult  Strain of neck muscle, initial encounter  -No alarm signs   -Robaxin ordered   -Will consider PT in the future     Orders:    Ambulatory Referral to Nutrition Services; Future    methocarbamol (ROBAXIN) 500 mg tablet; Take 1 tablet (500 mg total) by mouth 3 (three) times a day for 5 days    Type 2 diabetes mellitus without complication, without long-term current use of insulin (HCC)    Lab Results   Component Value Date    HGBA1C 6.0 2025     -Continue metformin   -Lipitor started   -Dietician referral ordered   -RTC in 2 months     Orders:    Ambulatory Referral to Nutrition Services; Future    atorvastatin (LIPITOR) 40 mg tablet; Take 0.5 tablets (20 mg total) by mouth before bedtime.    Encounter for smoking cessation counseling  -Patient attempting to stop chewing tobacco   -Nicotine gum ordered   -RTC in 2 months     Orders:    nicotine polacrilex (NICORETTE) 4 mg gum; Chew 1 each (4 mg total) as needed for smoking cessation        Preventive Screenings:  - Diabetes Screening: has diabetes, risks/benefits discussed and screening up-to-date  - Cholesterol Screening: risks/benefits discussed   - Hepatitis C screening: screening up-to-date   - HIV screening: screening up-to-date   - Colon cancer screening: screening up-to-date   - Lung cancer screening: screening not indicated   - Prostate cancer screening: risks/benefits discussed and screening up-to-date     Repeat colonoscopy in 3 years, due: 2026     Immunizations:    - Risks/benefits immunizations discussed      Counseling/Anticipatory Guidance:  -  "Alcohol: discussed moderation in alcohol intake and recommendations for healthy alcohol use.   - Drug use: discussed harms of illicit drug use and how it can negatively impact mental/physical health.   - Tobacco use: discussed harms of tobacco use and management options for quitting.   - Dental health: discussed importance of regular tooth brushing, flossing, and dental visits.   - Sexual health: discussed sexually transmitted diseases, partner selection, use of condoms, avoidance of unintended pregnancy, and contraceptive alternatives.   - Diet: discussed recommendations for a healthy/well-balanced diet.   - Exercise: the importance of regular exercise/physical activity was discussed. Recommend exercise 3-5 times per week for at least 30 minutes.   - Injury prevention: discussed safety/seat belts, safety helmets, smoke detectors, carbon monoxide detectors, and smoking near bedding or upholstery.       Tobacco Cessation Counseling: Tobacco cessation counseling was provided. The patient is sincerely urged to quit consumption of tobacco. He is ready to quit tobacco. Patient agreed to medication. Nicotine gum was prescribed.         History of Present Illness     Adult Annual Physical:  Patient presents for annual physical. Quinn Ramírez is a 50 y/o M with PMHx of bipolar one, T2DM, lumbar disc disease who presents today for annual exam. C/o neck pain that began a two weeks ago or so. Describes \"clicking\" in his neck and then an incidence where he was turning his head that triggered constant pain. This has been effecting his sleep 2/2 pain. No history of trauma or falls.     Reviewed labs and PFT with patient.     Discussed restarting statin, which he is agreeable too. .     Diet and Physical Activity:  - Diet/Nutrition: diabetic diet.  - Exercise: walking and 1-2 times a week on average.    Depression Screening:  - PHQ-2 Score: 0    General Health:  - Sleep: sleeps well.  - Hearing: normal hearing bilateral ears.  - " "Vision: no vision problems.  - Dental: regular dental visits. three times a week     Health:  - History of STDs: no.   - Urinary symptoms: none.     Advanced Care Planning:  - Has an advanced directive?: no    - Has a durable medical POA?: no    - ACP document given to patient?: no      Review of Systems   Constitutional:  Negative for fever.   Respiratory:  Negative for cough and shortness of breath.    Cardiovascular:  Negative for chest pain.   Gastrointestinal:  Negative for nausea and vomiting.   Musculoskeletal:  Positive for back pain and neck pain.   Skin:  Negative for rash.   Neurological:  Negative for weakness.   Psychiatric/Behavioral:  Positive for sleep disturbance (2/2 pain).          Objective   /80 (BP Location: Left arm, Patient Position: Sitting, Cuff Size: Standard)   Pulse 92   Temp 98.3 °F (36.8 °C) (Tympanic)   Resp 16   Ht 5' 8\" (1.727 m)   Wt 116 kg (255 lb 3.2 oz)   SpO2 98%   BMI 38.80 kg/m²     Physical Exam  Vitals and nursing note reviewed.   Constitutional:       General: He is not in acute distress.     Appearance: Normal appearance. He is obese. He is not ill-appearing or toxic-appearing.   HENT:      Head: Normocephalic and atraumatic.      Nose: Nose normal.     Eyes:      Conjunctiva/sclera: Conjunctivae normal.     Pulmonary:      Effort: Pulmonary effort is normal. No respiratory distress.     Musculoskeletal:      Right lower leg: No edema.      Left lower leg: No edema.      Comments: No bony step offs or deformities of spine   No spinal point tenderness   Cervical neck ROM limited by pain   Muscle hypertonicity prominent on R side of cervical spine and lumbar spine   Positive MER bilaterally, worse on R  5/5 strength of lower extremities bilaterally          Skin:     General: Skin is warm and dry.      Findings: No rash.     Neurological:      General: No focal deficit present.      Mental Status: He is alert and oriented to person, place, and time.      " Motor: No weakness.      Gait: Gait normal.     Psychiatric:         Mood and Affect: Mood normal.         Behavior: Behavior normal.         Thought Content: Thought content normal.         Janet Silverman DO   PGY-3 [facility]   [facility]

## 2025-07-02 NOTE — PROGRESS NOTES
"Adult Annual Physical  Name: Quinn Ramírez      : 1975      MRN: [id number]  Encounter Provider: Janet Silverman DO  Encounter Date: 2025   Encounter department: [facility] [place]    :  Assessment & Plan  Annual physical exam         BMI 38.0-38.9,adult    Orders:  •  Ambulatory Referral to Nutrition Services; Future    Strain of neck muscle, initial encounter    Orders:  •  methocarbamol (ROBAXIN) 500 mg tablet; Take 1 tablet (500 mg total) by mouth 3 (three) times a day for 5 days    Type 2 diabetes mellitus without complication, without long-term current use of insulin ([facility])    Lab Results   Component Value Date    HGBA1C 6.0 2025     Orders:  •  Ambulatory Referral to Nutrition Services; Future  •  atorvastatin (LIPITOR) 40 mg tablet; Take 0.5 tablets (20 mg total) by mouth before bedtime.    Encounter for smoking cessation counseling    Orders:  •  nicotine polacrilex (NICORETTE) 4 mg gum; Chew 1 each (4 mg total) as needed for smoking cessation          Annual Physical Plan       History of Present Illness     Adult Annual Physical  Review of Systems      Objective   /80 (BP Location: Left arm, Patient Position: Sitting, Cuff Size: Standard)   Pulse 92   Temp 98.3 °F (36.8 °C) (Tympanic)   Resp 16   Ht 5' 8\" (1.727 m)   Wt 116 kg (255 lb 3.2 oz)   SpO2 98%   BMI 38.80 kg/m²     Physical Exam    "

## 2025-07-02 NOTE — ASSESSMENT & PLAN NOTE
Lab Results   Component Value Date    HGBA1C 6.0 04/30/2025     -Continue metformin   -Lipitor started   -Dietician referral ordered   -RTC in 2 months     Orders:    Ambulatory Referral to Nutrition Services; Future    atorvastatin (LIPITOR) 40 mg tablet; Take 0.5 tablets (20 mg total) by mouth before bedtime.